# Patient Record
Sex: MALE | Race: BLACK OR AFRICAN AMERICAN | NOT HISPANIC OR LATINO
[De-identification: names, ages, dates, MRNs, and addresses within clinical notes are randomized per-mention and may not be internally consistent; named-entity substitution may affect disease eponyms.]

---

## 2017-06-06 ENCOUNTER — OTHER (OUTPATIENT)
Age: 82
End: 2017-06-06

## 2017-06-06 RX ADMIN — Medication 6: at 12:40

## 2017-06-24 ENCOUNTER — INPATIENT (INPATIENT)
Facility: HOSPITAL | Age: 82
LOS: 9 days | Discharge: HOME | DRG: 378 | End: 2017-07-04
Attending: INTERNAL MEDICINE | Admitting: INTERNAL MEDICINE
Payer: COMMERCIAL

## 2017-06-24 VITALS
SYSTOLIC BLOOD PRESSURE: 116 MMHG | DIASTOLIC BLOOD PRESSURE: 82 MMHG | RESPIRATION RATE: 16 BRPM | TEMPERATURE: 97 F | OXYGEN SATURATION: 99 % | HEART RATE: 73 BPM

## 2017-06-24 DIAGNOSIS — I35.0 NONRHEUMATIC AORTIC (VALVE) STENOSIS: ICD-10-CM

## 2017-06-24 DIAGNOSIS — K92.1 MELENA: ICD-10-CM

## 2017-06-24 LAB
ALBUMIN SERPL ELPH-MCNC: 3.5 G/DL — SIGNIFICANT CHANGE UP (ref 3.3–5)
ALP SERPL-CCNC: 58 U/L — SIGNIFICANT CHANGE UP (ref 40–120)
ALT FLD-CCNC: 17 U/L RC — SIGNIFICANT CHANGE UP (ref 10–45)
ANION GAP SERPL CALC-SCNC: 13 MMOL/L — SIGNIFICANT CHANGE UP (ref 5–17)
ANION GAP SERPL CALC-SCNC: 14 MMOL/L — SIGNIFICANT CHANGE UP (ref 5–17)
APPEARANCE UR: CLEAR — SIGNIFICANT CHANGE UP
APTT BLD: 25.3 SEC — LOW (ref 27.5–37.4)
AST SERPL-CCNC: 28 U/L — SIGNIFICANT CHANGE UP (ref 10–40)
BACTERIA # UR AUTO: NEGATIVE — SIGNIFICANT CHANGE UP
BASOPHILS # BLD AUTO: 0 K/UL — SIGNIFICANT CHANGE UP (ref 0–0.2)
BASOPHILS NFR BLD AUTO: 0.3 % — SIGNIFICANT CHANGE UP (ref 0–2)
BILIRUB SERPL-MCNC: 0.3 MG/DL — SIGNIFICANT CHANGE UP (ref 0.2–1.2)
BILIRUB UR-MCNC: NEGATIVE — SIGNIFICANT CHANGE UP
BLD GP AB SCN SERPL QL: NEGATIVE — SIGNIFICANT CHANGE UP
BUN SERPL-MCNC: 36 MG/DL — HIGH (ref 7–23)
BUN SERPL-MCNC: 42 MG/DL — HIGH (ref 7–23)
CALCIUM SERPL-MCNC: 9.5 MG/DL — SIGNIFICANT CHANGE UP (ref 8.4–10.5)
CALCIUM SERPL-MCNC: 9.7 MG/DL — SIGNIFICANT CHANGE UP (ref 8.4–10.5)
CHLORIDE SERPL-SCNC: 107 MMOL/L — SIGNIFICANT CHANGE UP (ref 96–108)
CHLORIDE SERPL-SCNC: 109 MMOL/L — HIGH (ref 96–108)
CK MB BLD-MCNC: 1.8 % — SIGNIFICANT CHANGE UP (ref 0–3.5)
CK MB CFR SERPL CALC: 2.3 NG/ML — SIGNIFICANT CHANGE UP (ref 0–6.7)
CK SERPL-CCNC: 125 U/L — SIGNIFICANT CHANGE UP (ref 30–200)
CO2 SERPL-SCNC: 18 MMOL/L — LOW (ref 22–31)
CO2 SERPL-SCNC: 18 MMOL/L — LOW (ref 22–31)
COLOR SPEC: YELLOW — SIGNIFICANT CHANGE UP
CREAT SERPL-MCNC: 1.51 MG/DL — HIGH (ref 0.5–1.3)
CREAT SERPL-MCNC: 1.79 MG/DL — HIGH (ref 0.5–1.3)
DIFF PNL FLD: NEGATIVE — SIGNIFICANT CHANGE UP
EOSINOPHIL # BLD AUTO: 0.1 K/UL — SIGNIFICANT CHANGE UP (ref 0–0.5)
EOSINOPHIL NFR BLD AUTO: 1.1 % — SIGNIFICANT CHANGE UP (ref 0–6)
EPI CELLS # UR: 1 /HPF — SIGNIFICANT CHANGE UP (ref 0–5)
GLUCOSE SERPL-MCNC: 242 MG/DL — HIGH (ref 70–99)
GLUCOSE SERPL-MCNC: 262 MG/DL — HIGH (ref 70–99)
GLUCOSE UR QL: 250 MG/DL
HCT VFR BLD CALC: 22.9 % — LOW (ref 39–50)
HCT VFR BLD CALC: 24.7 % — LOW (ref 39–50)
HCT VFR BLD CALC: 26.4 % — LOW (ref 39–50)
HGB BLD-MCNC: 7.6 G/DL — LOW (ref 13–17)
HGB BLD-MCNC: 8.3 G/DL — LOW (ref 13–17)
HGB BLD-MCNC: 8.5 G/DL — LOW (ref 13–17)
HYALINE CASTS # UR AUTO: 2 /LPF — SIGNIFICANT CHANGE UP (ref 0–7)
INR BLD: 1.25 RATIO — HIGH (ref 0.88–1.16)
KETONES UR-MCNC: NEGATIVE — SIGNIFICANT CHANGE UP
LEUKOCYTE ESTERASE UR-ACNC: NEGATIVE — SIGNIFICANT CHANGE UP
LYMPHOCYTES # BLD AUTO: 1.2 K/UL — SIGNIFICANT CHANGE UP (ref 1–3.3)
LYMPHOCYTES # BLD AUTO: 13 % — SIGNIFICANT CHANGE UP (ref 13–44)
MAGNESIUM SERPL-MCNC: 1.9 MG/DL — SIGNIFICANT CHANGE UP (ref 1.6–2.6)
MCHC RBC-ENTMCNC: 26 PG — LOW (ref 27–34)
MCHC RBC-ENTMCNC: 28 PG — SIGNIFICANT CHANGE UP (ref 27–34)
MCHC RBC-ENTMCNC: 29.6 PG — SIGNIFICANT CHANGE UP (ref 27–34)
MCHC RBC-ENTMCNC: 31.4 GM/DL — LOW (ref 32–36)
MCHC RBC-ENTMCNC: 33.2 GM/DL — SIGNIFICANT CHANGE UP (ref 32–36)
MCHC RBC-ENTMCNC: 34.3 GM/DL — SIGNIFICANT CHANGE UP (ref 32–36)
MCV RBC AUTO: 82.8 FL — SIGNIFICANT CHANGE UP (ref 80–100)
MCV RBC AUTO: 84.3 FL — SIGNIFICANT CHANGE UP (ref 80–100)
MCV RBC AUTO: 86.4 FL — SIGNIFICANT CHANGE UP (ref 80–100)
MONOCYTES # BLD AUTO: 0.7 K/UL — SIGNIFICANT CHANGE UP (ref 0–0.9)
MONOCYTES NFR BLD AUTO: 7.7 % — SIGNIFICANT CHANGE UP (ref 2–14)
NEUTROPHILS # BLD AUTO: 6.9 K/UL — SIGNIFICANT CHANGE UP (ref 1.8–7.4)
NEUTROPHILS NFR BLD AUTO: 78 % — HIGH (ref 43–77)
NITRITE UR-MCNC: NEGATIVE — SIGNIFICANT CHANGE UP
OB PNL STL: NEGATIVE — SIGNIFICANT CHANGE UP
PH UR: 5.5 — SIGNIFICANT CHANGE UP (ref 5–8)
PHOSPHATE SERPL-MCNC: 3.1 MG/DL — SIGNIFICANT CHANGE UP (ref 2.5–4.5)
PLATELET # BLD AUTO: 147 K/UL — LOW (ref 150–400)
PLATELET # BLD AUTO: 176 K/UL — SIGNIFICANT CHANGE UP (ref 150–400)
PLATELET # BLD AUTO: 194 K/UL — SIGNIFICANT CHANGE UP (ref 150–400)
POTASSIUM SERPL-MCNC: 4.8 MMOL/L — SIGNIFICANT CHANGE UP (ref 3.5–5.3)
POTASSIUM SERPL-MCNC: 5.7 MMOL/L — HIGH (ref 3.5–5.3)
POTASSIUM SERPL-SCNC: 4.8 MMOL/L — SIGNIFICANT CHANGE UP (ref 3.5–5.3)
POTASSIUM SERPL-SCNC: 5.7 MMOL/L — HIGH (ref 3.5–5.3)
PROT SERPL-MCNC: 6.9 G/DL — SIGNIFICANT CHANGE UP (ref 6–8.3)
PROT UR-MCNC: NEGATIVE MG/DL — SIGNIFICANT CHANGE UP
PROTHROM AB SERPL-ACNC: 13.7 SEC — HIGH (ref 9.8–12.7)
RBC # BLD: 2.72 M/UL — LOW (ref 4.2–5.8)
RBC # BLD: 2.86 M/UL — LOW (ref 4.2–5.8)
RBC # BLD: 3.19 M/UL — LOW (ref 4.2–5.8)
RBC # FLD: 13.9 % — SIGNIFICANT CHANGE UP (ref 10.3–14.5)
RBC # FLD: 14.3 % — SIGNIFICANT CHANGE UP (ref 10.3–14.5)
RBC # FLD: 15 % — HIGH (ref 10.3–14.5)
RBC CASTS # UR COMP ASSIST: 1 /HPF — SIGNIFICANT CHANGE UP (ref 0–4)
RH IG SCN BLD-IMP: POSITIVE — SIGNIFICANT CHANGE UP
SODIUM SERPL-SCNC: 139 MMOL/L — SIGNIFICANT CHANGE UP (ref 135–145)
SODIUM SERPL-SCNC: 140 MMOL/L — SIGNIFICANT CHANGE UP (ref 135–145)
SP GR SPEC: 1.02 — SIGNIFICANT CHANGE UP (ref 1.01–1.02)
TROPONIN T SERPL-MCNC: <0.01 NG/ML — SIGNIFICANT CHANGE UP (ref 0–0.06)
UROBILINOGEN FLD QL: NEGATIVE MG/DL — SIGNIFICANT CHANGE UP
WBC # BLD: 8.2 K/UL — SIGNIFICANT CHANGE UP (ref 3.8–10.5)
WBC # BLD: 8.48 K/UL — SIGNIFICANT CHANGE UP (ref 3.8–10.5)
WBC # BLD: 8.9 K/UL — SIGNIFICANT CHANGE UP (ref 3.8–10.5)
WBC # FLD AUTO: 8.2 K/UL — SIGNIFICANT CHANGE UP (ref 3.8–10.5)
WBC # FLD AUTO: 8.48 K/UL — SIGNIFICANT CHANGE UP (ref 3.8–10.5)
WBC # FLD AUTO: 8.9 K/UL — SIGNIFICANT CHANGE UP (ref 3.8–10.5)
WBC UR QL: 0 /HPF — SIGNIFICANT CHANGE UP (ref 0–5)

## 2017-06-24 PROCEDURE — 93010 ELECTROCARDIOGRAM REPORT: CPT

## 2017-06-24 PROCEDURE — 71010: CPT | Mod: 26

## 2017-06-24 PROCEDURE — 99285 EMERGENCY DEPT VISIT HI MDM: CPT | Mod: 25

## 2017-06-24 RX ORDER — LISINOPRIL 2.5 MG/1
10 TABLET ORAL DAILY
Qty: 0 | Refills: 0 | Status: DISCONTINUED | OUTPATIENT
Start: 2017-06-24 | End: 2017-07-04

## 2017-06-24 RX ORDER — DEXTROSE 50 % IN WATER 50 %
1 SYRINGE (ML) INTRAVENOUS ONCE
Qty: 0 | Refills: 0 | Status: DISCONTINUED | OUTPATIENT
Start: 2017-06-24 | End: 2017-07-04

## 2017-06-24 RX ORDER — INSULIN LISPRO 100/ML
VIAL (ML) SUBCUTANEOUS AT BEDTIME
Qty: 0 | Refills: 0 | Status: DISCONTINUED | OUTPATIENT
Start: 2017-06-24 | End: 2017-07-04

## 2017-06-24 RX ORDER — ATORVASTATIN CALCIUM 80 MG/1
10 TABLET, FILM COATED ORAL AT BEDTIME
Qty: 0 | Refills: 0 | Status: DISCONTINUED | OUTPATIENT
Start: 2017-06-24 | End: 2017-07-04

## 2017-06-24 RX ORDER — DEXTROSE 50 % IN WATER 50 %
12.5 SYRINGE (ML) INTRAVENOUS ONCE
Qty: 0 | Refills: 0 | Status: DISCONTINUED | OUTPATIENT
Start: 2017-06-24 | End: 2017-07-04

## 2017-06-24 RX ORDER — SODIUM CHLORIDE 9 MG/ML
1000 INJECTION, SOLUTION INTRAVENOUS
Qty: 0 | Refills: 0 | Status: DISCONTINUED | OUTPATIENT
Start: 2017-06-24 | End: 2017-07-04

## 2017-06-24 RX ORDER — TAMSULOSIN HYDROCHLORIDE 0.4 MG/1
0.4 CAPSULE ORAL AT BEDTIME
Qty: 0 | Refills: 0 | Status: DISCONTINUED | OUTPATIENT
Start: 2017-06-24 | End: 2017-07-04

## 2017-06-24 RX ORDER — DEXTROSE 50 % IN WATER 50 %
25 SYRINGE (ML) INTRAVENOUS ONCE
Qty: 0 | Refills: 0 | Status: DISCONTINUED | OUTPATIENT
Start: 2017-06-24 | End: 2017-07-04

## 2017-06-24 RX ORDER — SODIUM CHLORIDE 9 MG/ML
1000 INJECTION INTRAMUSCULAR; INTRAVENOUS; SUBCUTANEOUS
Qty: 0 | Refills: 0 | Status: DISCONTINUED | OUTPATIENT
Start: 2017-06-24 | End: 2017-07-01

## 2017-06-24 RX ORDER — INSULIN LISPRO 100/ML
VIAL (ML) SUBCUTANEOUS
Qty: 0 | Refills: 0 | Status: DISCONTINUED | OUTPATIENT
Start: 2017-06-24 | End: 2017-07-04

## 2017-06-24 RX ORDER — GLUCAGON INJECTION, SOLUTION 0.5 MG/.1ML
1 INJECTION, SOLUTION SUBCUTANEOUS ONCE
Qty: 0 | Refills: 0 | Status: DISCONTINUED | OUTPATIENT
Start: 2017-06-24 | End: 2017-07-04

## 2017-06-24 RX ORDER — AMLODIPINE BESYLATE 2.5 MG/1
10 TABLET ORAL DAILY
Qty: 0 | Refills: 0 | Status: DISCONTINUED | OUTPATIENT
Start: 2017-06-24 | End: 2017-07-04

## 2017-06-24 RX ORDER — PANTOPRAZOLE SODIUM 20 MG/1
40 TABLET, DELAYED RELEASE ORAL DAILY
Qty: 0 | Refills: 0 | Status: DISCONTINUED | OUTPATIENT
Start: 2017-06-24 | End: 2017-06-24

## 2017-06-24 RX ORDER — PANTOPRAZOLE SODIUM 20 MG/1
40 TABLET, DELAYED RELEASE ORAL
Qty: 0 | Refills: 0 | Status: DISCONTINUED | OUTPATIENT
Start: 2017-06-24 | End: 2017-07-04

## 2017-06-24 RX ADMIN — TAMSULOSIN HYDROCHLORIDE 0.4 MILLIGRAM(S): 0.4 CAPSULE ORAL at 22:43

## 2017-06-24 RX ADMIN — SODIUM CHLORIDE 40 MILLILITER(S): 9 INJECTION INTRAMUSCULAR; INTRAVENOUS; SUBCUTANEOUS at 16:25

## 2017-06-24 RX ADMIN — ATORVASTATIN CALCIUM 10 MILLIGRAM(S): 80 TABLET, FILM COATED ORAL at 22:43

## 2017-06-24 RX ADMIN — PANTOPRAZOLE SODIUM 40 MILLIGRAM(S): 20 TABLET, DELAYED RELEASE ORAL at 18:10

## 2017-06-24 NOTE — CONSULT NOTE ADULT - PROBLEM SELECTOR RECOMMENDATION 9
- H/H and cbc monitoring -- transfuse prn - to receive one unit prbcs, follow up repeat cbc after transfusion  - Check FOBT  - PPI 40 mg IV BID  - Diet: Clear liquid   - EGD planning for ? Monday - will confirm w/ attending  - To follow - H/H and cbc monitoring -- transfuse prn - to receive one unit prbcs, follow up repeat cbc after transfusion  - Check FOBT  - PPI 40 mg IV BID  - Diet: Clear liquid   - EGD planning for Monday, NPO p MN sunday  - To follow

## 2017-06-24 NOTE — H&P ADULT - HISTORY OF PRESENT ILLNESS
: 89 year old male with PMHx of aortic stenosis, DM, HTN, HLD, BPH, CAD on baby aspirin and Plavix, presenting with melena since yesterday, 7 episodes total, accompanied by weakness, lightheadedness, and mild dyspnea on exertion. He had a colonoscopy 2 years ago after having BRBPR and was found to have a polyp. Denies abdominal pain, nausea, vomiting, or fever. No other NSAID use or prior PUD. , PMD BASHIR Piper that treated LGIB 2 years ago was Emily : 89 year old male with PMHx of aortic stenosis, DM, TAVR,  HTN, HLD, BPH, CAD on baby aspirin and Plavix, presenting with melena since yesterday, 7 episodes total, accompanied by weakness, lightheadedness, and mild dyspnea on exertion. He had a colonoscopy 2 years ago after having BRBPR and was found to have a polyp. Denies abdominal pain, nausea, vomiting, or fever. No other NSAID use or prior PUD. , PMD BASHIR Piper that treated LGIB 2 years ago was Emily

## 2017-06-24 NOTE — H&P ADULT - ASSESSMENT
pt  with melena,  anemia from  acute  gi  blood loss, prbc,  gi  dr caban called, serial hb,  hold  asa/plavix  for  now

## 2017-06-24 NOTE — ED ADULT NURSE NOTE - OBJECTIVE STATEMENT
Received pt alert and orientedX4. No distress. breathing easy and non labored. Pt with intermittent forgetfulness. No active bleeding.

## 2017-06-24 NOTE — ED PROVIDER NOTE - MEDICAL DECISION MAKING DETAILS
89 year old male with melena and weakness since yesterday, on exam conjunctival pallor, will get labs incl type and screen and cardiacs, possible transfusion, GI consultation and admit to hospital for inpatient work up. 89 year old male with melena and weakness since yesterday, on exam conjunctival pallor, will get labs incl type and screen and cardiacs, possible transfusion, GI consultation and admit to hospital for inpatient work up.  Att yo male on asa/plavix presents with 4 episodes of melena since last night; no abdominal pain; no fevers; + gabriel; lightheadedness; no cp; on exam nad, lungs cta, heart rrr, nontender abdomen, + melena; Plan: labs, type, protonix, ekg, ce, admission, gi consult 89 year old male with melena and weakness since yesterday, on exam conjunctival pallor, will get labs incl type and screen and cardiacs, likely transfusion, GI consultation and admit to hospital for inpatient work up.  Att yo male on asa/plavix presents with 4 episodes of melena since last night; no abdominal pain; no fevers; + gabriel; lightheadedness; no cp; on exam nad, lungs cta, heart rrr, nontender abdomen, + melena; Plan: labs, type, protonix, ekg, ce, admission, gi consult

## 2017-06-24 NOTE — CONSULT NOTE ADULT - SUBJECTIVE AND OBJECTIVE BOX
Chief Complaint:  Patient is a 89y old  Male who presents with a chief complaint of black stools    HPI: 89 M w/ PMHX PVD/LBBB/HTN/HLD/ AS s/p AVR admitted for melena x 4 episodes within the past two days, with the last episode 3 hours ago. Patient reports no hx of melena, has been taking aspirin and plavix at home.  He denies abdominal pain/nausea/vomiting/fever/chills  Has had an egd about 5 years ago but cannot recall the results  Last colonoscopy about two years ago w/ polyps        PAST MEDICAL & SURGICAL HISTORY:  Vocal cord polyp: pending treatment diagnosed in 2014  BPH (benign prostatic hypertrophy)  PVD (peripheral vascular disease)  LBBB (left bundle branch block): incomplete  HTN (hypertension)  Hypercholesterolemia  DM2 (diabetes mellitus, type 2)  AS (aortic stenosis)  No significant past surgical history      Previous Diagnostic Testing:    EGD: About five years ago -- cannot recall results     Colonoscopy: 5-12-15    Findings:       A flat polyp was found in the ascending colon. The polyp was removed with a jumbo cold        forceps. Resection and retrieval were complete.       A sessile polyp was found in the ascending colon. The polyp was removed with a hot snare.        There was some bleeding and a resolution clip was placed. Resection and retrieval were        complete.       Multiple small and large-mouthed diverticula were found in the sigmoid colon and in the        descending colon.                                                                                                        Impression:          - One polyp in the ascending colon. Resected and retrieved.                       - One polyp in the ascending colon. Resected and retrieved.  Recommendation:      - Await pathology results.                                                                                                            FAMILY HISTORY:  No pertinent family history in first degree relatives      Social History: Marital Status:  Lives With: Spouse Substance Abuse: None Smoker: Unknown    Allergies    Cipro (Pruritus; Rash)    Intolerances        Review of Systems:    General:  No wt loss, fevers, chills, night sweats  Eyes:  Good vision, no reported pain  ENT:  No sore throat, pain, runny nose, dysphagia  CV:  No pain, palpitations hypo/hypertension  Resp:  No dyspnea, cough, tachypnea, wheezing  :  No pain, bleeding, incontinence, nocturia  Muscle:  No pain, + weakness  Neuro:  No weakness, tingling, memory problems  Psych:  No fatigue, insomnia, mood problems, depression  Endocrine:  No polyuria, polydypsia, cold/heat intolerance  Heme:  No petechiae, ecchymosis, easy bruisability  Skin:  No rash, tattoos, scars, edema    Physical Exam:    Vital Signs:  Vital Signs Last 24 Hrs  T(C): 36.3, Max: 36.3 (06-24 @ 10:04)  T(F): 97.4, Max: 97.4 (06-24 @ 10:04)  HR: 73 (73 - 73)  BP: 116/82 (116/82 - 116/82)  BP(mean): --  RR: 16 (16 - 16)  SpO2: 99% (99% - 99%)  Daily     Daily     General:  Appears stated age, well-groomed, well-nourished, no distress  HEENT:  NC/AT,  conjunctivae clear and pink, no thyromegaly, nodules, adenopathy, no JVD  Chest:  Full & symmetric excursion, no increased effort, breath sounds clear  Cardiovascular:  Regular rhythm, S1, S2, no murmur/rub/S3/S4, no abdominal bruit, no edema  Abdomen:  Soft, non-tender, non-distended, normoactive bowel sounds,  no masses ,no hepatosplenomegaly, no signs of chronic liver disease  Extremities:  no cyanosis, clubbing or edema  Skin:  No rash/erythema/ecchymoses/petechiae/wounds/abscess/warm/dry  Neuro/Psych:  Alert, oriented, no asterixis, no tremor, no encephalopathy      Imaging:      Laboratory:  06-24    140  |  109<H>  |  42<H>  ----------------------------<  262<H>  5.7<H>   |  18<L>  |  1.79<H>    Ca    9.7      24 Jun 2017 10:58  Phos  3.1     06-24  Mg     1.9     06-24    TPro  6.9  /  Alb  3.5  /  TBili  0.3  /  DBili  x   /  AST  28  /  ALT  17  /  AlkPhos  58  06-24                          7.6    8.9   )-----------( 176      ( 24 Jun 2017 10:58 )             22.9         LIVER FUNCTIONS - ( 24 Jun 2017 10:58 )  Alb: 3.5 g/dL / Pro: 6.9 g/dL / ALK PHOS: 58 U/L / ALT: 17 U/L RC / AST: 28 U/L / GGT: x           PT/INR - ( 24 Jun 2017 10:58 )   PT: 13.7 sec;   INR: 1.25 ratio         PTT - ( 24 Jun 2017 10:58 )  PTT:25.3 sec
CHIEF COMPLAINT:Patient is a 89y old  Male who presents with a chief complaint of melena (24 Jun 2017 12:04)      HPI:  : 89 year old male with PMHx of aortic stenosis, DM, TAVR,  HTN, HLD, BPH, CAD on baby aspirin and Plavix, presenting with melena since yesterday, 7 episodes total, accompanied by weakness, lightheadedness, and mild dyspnea on exertion. He had a colonoscopy 2 years ago after having BRBPR and was found to have a polyp. Denies abdominal pain, nausea, vomiting, or fever. No other NSAID use or prior PUD. , PMD BASHIR Piper that treated LGIB 2 years ago was Emily (24 Jun 2017 12:04)  no chest pain,no sob.pt was found to be anemic .    PAST MEDICAL & SURGICAL HISTORY:  Vocal cord polyp: pending treatment diagnosed in 2014  BPH (benign prostatic hypertrophy)  PVD (peripheral vascular disease)  LBBB (left bundle branch block): incomplete  HTN (hypertension)  Hypercholesterolemia  DM2 (diabetes mellitus, type 2)  AS (aortic stenosis)  No significant past surgical history      MEDICATIONS  (STANDING):  pantoprazole  Injectable 40milliGRAM(s) IV Push two times a day  lisinopril 10milliGRAM(s) Oral daily  tamsulosin 0.4milliGRAM(s) Oral at bedtime  atorvastatin 10milliGRAM(s) Oral at bedtime  amLODIPine   Tablet 10milliGRAM(s) Oral daily    MEDICATIONS  (PRN):      FAMILY HISTORY:  No pertinent family history in first degree relatives      SOCIAL HISTORY:    [ ] Non-smoker  [ ] Smoker  [ ] Alcohol    Allergies    Cipro (Pruritus; Rash)    Intolerances    	    REVIEW OF SYSTEMS:  CONSTITUTIONAL:no, visual disturbances, or discharge,+fatigue  ENT:  No difficulty hearing, tinnitus, vertigo; No sinus or throat pain  NECK: No pain or stiffness  RESPIRATORY: No cough, wheezing, chills or hemoptysis; No Shortness of Breath  CARDIOVASCULAR: No chest pain, palpitations, passing out, dizziness, or leg swelling  GASTROINTESTINAL: No abdominal or epigastric pain. No nausea, vomiting, or hematemesis; No diarrhea or constipation. No melena or hematochezia.  GENITOURINARY: No dysuria, frequency, hematuria, or incontinence  NEUROLOGICAL: No headaches, memory loss, loss of strength, numbness, or tremors  SKIN: No itching, burning, rashes, or lesions   LYMPH Nodes: No enlarged glands  ENDOCRINE: No heat or cold intolerance; No hair loss  MUSCULOSKELETAL: No joint pain or swelling; No muscle, back, or extremity pain  PSYCHIATRIC: No depression, anxiety, mood swings, or difficulty sleeping  HEME/LYMPH: No easy bruising, or bleeding gums  ALLERGY AND IMMUNOLOGIC: No hives or eczema	    [ ] All others negative	  [ ] Unable to obtain    PHYSICAL EXAM:  T(C): 36.3, Max: 36.3 (06-24 @ 10:04)  HR: 73 (73 - 73)  BP: 116/82 (116/82 - 116/82)  RR: 16 (16 - 16)  SpO2: 99% (99% - 99%)  Wt(kg): --  I&O's Summary      Appearance: Normal	  HEENT:   Normal oral mucosa, PERRL, EOMI	  Lymphatic: No lymphadenopathy  Cardiovascular: Normal S1 S2, No JVD, + SADIA murmurs, No edema  Respiratory: Lungs clear to auscultation	  Psychiatry: A & O x 3, Mood & affect appropriate  Gastrointestinal:  Soft, Non-tender, + BS	  Skin: No rashes, No ecchymoses, No cyanosis	  Neurologic: Non-focal  Extremities: Normal range of motion, No clubbing, cyanosis or edema  Vascular: Peripheral pulses palpable 2+ bilaterally    TELEMETRY: 	    ECG:NORMAL SINUS RHYTHM  LEFT VENTRICULAR HYPERTROPHY WITH QRS WIDENING AND REPOLARIZATION ABNORMALITY  CANNOT RULE OUT SEPTAL INFARCT , AGE UNDETERMINED  RADIOLOGY:  OTHER: 	  	  LABS:	 	    CARDIAC MARKERS:  CARDIAC MARKERS ( 24 Jun 2017 10:58 )  x     / <0.01 ng/mL / 125 U/L / x     / 2.3 ng/mL    ECHO  . Transcatheter aortic valve replacement, well seated.  Peak transaortic valve gradient equals 46 mm Hg, mean  transaortic valve gradient equals 25 mm Hg, which is  elevated. Elevated gradients are likely due to hyperdynamic  LV function.  No paravalvular or valvular aortic valve  regurgitation seen.  2. Normal left ventricular internal dimensions and wall  thicknesses.  3. Hyperdynamic left ventricle. Peak left ventricular  outflow tract gradient equals 49 mm Hg (87 with valsalva),  without evidence of obstruction due to hyperdynamic LV  function.  4. Mild diastolic dysfunction (Stage I).  5. Normal right ventricular size and function.  6. Estimated pulmonary artery systolic pressure equals 42  mm Hg, assuming right atrial pressure equals 8 mm Hg,  consistent with mild pulmonary pressures.                        7.6    8.9   )-----------( 176      ( 24 Jun 2017 10:58 )             22.9     06-24    140  |  109<H>  |  42<H>  ----------------------------<  262<H>  5.7<H>   |  18<L>  |  1.79<H>    Ca    9.7      24 Jun 2017 10:58  Phos  3.1     06-24  Mg     1.9     06-24    TPro  6.9  /  Alb  3.5  /  TBili  0.3  /  DBili  x   /  AST  28  /  ALT  17  /  AlkPhos  58  06-24    proBNP:   Lipid Profile:   HgA1c:   TSH:     PREVIOUS DIAGNOSTIC TESTING:    [ ] Echocardiogram:  [ ]  Catheterization:  [ ] Stress Test:        IMPRESSION: Removal of right jugular line since 4:27 AM on 7/03/15.

## 2017-06-24 NOTE — CONSULT NOTE ADULT - ASSESSMENT
pt with hx of cad,s/p tavr in 2015 now with melena.  fu cbc  kepp hgb>10  may hold asa and plavix  pt is clear for egd.  continue all cardiac meds.

## 2017-06-24 NOTE — ED ADULT NURSE NOTE - PMH
AS (aortic stenosis)    BPH (benign prostatic hypertrophy)    DM2 (diabetes mellitus, type 2)    HTN (hypertension)    Hypercholesterolemia    LBBB (left bundle branch block)  incomplete  PVD (peripheral vascular disease)    Vocal cord polyp  pending treatment diagnosed in 2014

## 2017-06-24 NOTE — ED PROVIDER NOTE - OBJECTIVE STATEMENT
89 year old male with PMHx of aortic stenosis, DM, HTN, HLD, BPH, CAD on baby aspirin and Plavix, presenting with melena since yesterday, 7 episodes total, accompanied by weakness, lightheadedness, and mild dyspnea on exertion. He had a colonoscopy 2 years ago after having BRBPR and was found to have a polyp. Denies abdominal pain, nausea, vomiting, or fever. No other NSAID use or prior PUD.   PMD BASHIR Piper that treated LGIB 2 years ago was Emily

## 2017-06-24 NOTE — ED PROVIDER NOTE - ATTENDING CONTRIBUTION TO CARE
Att yo male on asa/plavix presents with 4 episodes of melena since last night; no abdominal pain; no fevers; + gabriel; lightheadedness; no cp; on exam nad, lungs cta, heart rrr, nontender abdomen, + melena; Plan: labs, type, protonix, ekg, ce, admission, gi consult

## 2017-06-24 NOTE — H&P ADULT - NSHPREVIEWOFSYSTEMS_GEN_ALL_CORE
REVIEW OF SYSTEMS:    CONSTITUTIONAL: No weakness, fevers or chills  EYES/ENT: No visual changes;  No vertigo or throat pain   NECK: No pain or stiffness  RESPIRATORY: No cough, wheezing, hemoptysis; No shortness of breath  CARDIOVASCULAR: No chest pain or palpitations  GASTROINTESTINAL: No abdominal or epigastric pain. No nausea, vomiting, or hematemesis; No diarrhea or constipation. melena  GENITOURINARY: No dysuria, frequency or hematuria  NEUROLOGICAL: No numbness or weakness  SKIN: No itching, rashes

## 2017-06-24 NOTE — H&P ADULT - NSHPPHYSICALEXAM_GEN_ALL_CORE
PHYSICAL EXAMINATION:  Vital Signs Last 24 Hrs  T(C): 36.3, Max: 36.3 (06-24 @ 10:04)  T(F): 97.4, Max: 97.4 (06-24 @ 10:04)  HR: 73 (73 - 73)  BP: 116/82 (116/82 - 116/82)  BP(mean): --  RR: 16 (16 - 16)  SpO2: 99% (99% - 99%)  CAPILLARY BLOOD GLUCOSE        GENERAL: NAD, well-groomed, well-developed  HEAD:  atraumatic, normocephalic  EYES: sclera anicteric  ENMT: mucous membranes moist  NECK: supple, No JVD  CHEST/LUNG: clear to auscultation bilaterally; no rales, rhonchi, or wheezing b/l  HEART: normal S1, S2  ABDOMEN: BS+, soft, ND, NT   EXTREMITIES:  pulses palpable; no clubbing, cyanosis, or edema b/l LEs  NEURO: awake, alert, interactive; moves all extremities  SKIN: no rashes or lesions

## 2017-06-24 NOTE — H&P ADULT - NSHPLABSRESULTS_GEN_ALL_CORE
LABS:                        7.6    8.9   )-----------( 176      ( 24 Jun 2017 10:58 )             22.9     06-24    140  |  109<H>  |  42<H>  ----------------------------<  262<H>  5.7<H>   |  18<L>  |  1.79<H>    Ca    9.7      24 Jun 2017 10:58  Phos  3.1     06-24  Mg     1.9     06-24    TPro  6.9  /  Alb  3.5  /  TBili  0.3  /  DBili  x   /  AST  28  /  ALT  17  /  AlkPhos  58  06-24    PT/INR - ( 24 Jun 2017 10:58 )   PT: 13.7 sec;   INR: 1.25 ratio         PTT - ( 24 Jun 2017 10:58 )  PTT:25.3 sec        RADIOLOGY & ADDITIONAL TESTS:

## 2017-06-25 LAB
ANION GAP SERPL CALC-SCNC: 11 MMOL/L — SIGNIFICANT CHANGE UP (ref 5–17)
APTT BLD: 28.2 SEC — SIGNIFICANT CHANGE UP (ref 27.5–37.4)
BASOPHILS # BLD AUTO: 0.02 K/UL — SIGNIFICANT CHANGE UP (ref 0–0.2)
BASOPHILS NFR BLD AUTO: 0.3 % — SIGNIFICANT CHANGE UP (ref 0–2)
BUN SERPL-MCNC: 28 MG/DL — HIGH (ref 7–23)
CALCIUM SERPL-MCNC: 9.1 MG/DL — SIGNIFICANT CHANGE UP (ref 8.4–10.5)
CHLORIDE SERPL-SCNC: 110 MMOL/L — HIGH (ref 96–108)
CO2 SERPL-SCNC: 19 MMOL/L — LOW (ref 22–31)
CREAT SERPL-MCNC: 1.46 MG/DL — HIGH (ref 0.5–1.3)
EOSINOPHIL # BLD AUTO: 0.2 K/UL — SIGNIFICANT CHANGE UP (ref 0–0.5)
EOSINOPHIL NFR BLD AUTO: 2.6 % — SIGNIFICANT CHANGE UP (ref 0–6)
GLUCOSE SERPL-MCNC: 104 MG/DL — HIGH (ref 70–99)
HBA1C BLD-MCNC: 7.8 % — HIGH (ref 4–5.6)
HCT VFR BLD CALC: 24.2 % — LOW (ref 39–50)
HCT VFR BLD CALC: 25.5 % — LOW (ref 39–50)
HCT VFR BLD CALC: 28.4 % — LOW (ref 39–50)
HGB BLD-MCNC: 7.6 G/DL — LOW (ref 13–17)
HGB BLD-MCNC: 9 G/DL — LOW (ref 13–17)
HGB BLD-MCNC: 9.1 G/DL — LOW (ref 13–17)
IMM GRANULOCYTES NFR BLD AUTO: 0.4 % — SIGNIFICANT CHANGE UP (ref 0–1.5)
INR BLD: 1.21 RATIO — HIGH (ref 0.88–1.16)
LYMPHOCYTES # BLD AUTO: 1.59 K/UL — SIGNIFICANT CHANGE UP (ref 1–3.3)
LYMPHOCYTES # BLD AUTO: 20.7 % — SIGNIFICANT CHANGE UP (ref 13–44)
MCHC RBC-ENTMCNC: 25.2 PG — LOW (ref 27–34)
MCHC RBC-ENTMCNC: 26.8 PG — LOW (ref 27–34)
MCHC RBC-ENTMCNC: 30.3 PG — SIGNIFICANT CHANGE UP (ref 27–34)
MCHC RBC-ENTMCNC: 31.4 GM/DL — LOW (ref 32–36)
MCHC RBC-ENTMCNC: 32 GM/DL — SIGNIFICANT CHANGE UP (ref 32–36)
MCHC RBC-ENTMCNC: 35.2 GM/DL — SIGNIFICANT CHANGE UP (ref 32–36)
MCV RBC AUTO: 80.1 FL — SIGNIFICANT CHANGE UP (ref 80–100)
MCV RBC AUTO: 83.5 FL — SIGNIFICANT CHANGE UP (ref 80–100)
MCV RBC AUTO: 86 FL — SIGNIFICANT CHANGE UP (ref 80–100)
MONOCYTES # BLD AUTO: 0.87 K/UL — SIGNIFICANT CHANGE UP (ref 0–0.9)
MONOCYTES NFR BLD AUTO: 11.3 % — SIGNIFICANT CHANGE UP (ref 2–14)
NEUTROPHILS # BLD AUTO: 4.98 K/UL — SIGNIFICANT CHANGE UP (ref 1.8–7.4)
NEUTROPHILS NFR BLD AUTO: 64.7 % — SIGNIFICANT CHANGE UP (ref 43–77)
PLATELET # BLD AUTO: 157 K/UL — SIGNIFICANT CHANGE UP (ref 150–400)
PLATELET # BLD AUTO: 186 K/UL — SIGNIFICANT CHANGE UP (ref 150–400)
PLATELET # BLD AUTO: 201 K/UL — SIGNIFICANT CHANGE UP (ref 150–400)
POTASSIUM SERPL-MCNC: 4.8 MMOL/L — SIGNIFICANT CHANGE UP (ref 3.5–5.3)
POTASSIUM SERPL-SCNC: 4.8 MMOL/L — SIGNIFICANT CHANGE UP (ref 3.5–5.3)
PROTHROM AB SERPL-ACNC: 13.7 SEC — HIGH (ref 10–13.1)
RBC # BLD: 2.96 M/UL — LOW (ref 4.2–5.8)
RBC # BLD: 3.02 M/UL — LOW (ref 4.2–5.8)
RBC # BLD: 3.4 M/UL — LOW (ref 4.2–5.8)
RBC # FLD: 13.6 % — SIGNIFICANT CHANGE UP (ref 10.3–14.5)
RBC # FLD: 14.4 % — SIGNIFICANT CHANGE UP (ref 10.3–14.5)
RBC # FLD: 14.7 % — HIGH (ref 10.3–14.5)
SODIUM SERPL-SCNC: 140 MMOL/L — SIGNIFICANT CHANGE UP (ref 135–145)
WBC # BLD: 7.69 K/UL — SIGNIFICANT CHANGE UP (ref 3.8–10.5)
WBC # BLD: 9.1 K/UL — SIGNIFICANT CHANGE UP (ref 3.8–10.5)
WBC # BLD: 9.6 K/UL — SIGNIFICANT CHANGE UP (ref 3.8–10.5)
WBC # FLD AUTO: 7.69 K/UL — SIGNIFICANT CHANGE UP (ref 3.8–10.5)
WBC # FLD AUTO: 9.1 K/UL — SIGNIFICANT CHANGE UP (ref 3.8–10.5)
WBC # FLD AUTO: 9.6 K/UL — SIGNIFICANT CHANGE UP (ref 3.8–10.5)

## 2017-06-25 RX ORDER — ACETAMINOPHEN 500 MG
650 TABLET ORAL EVERY 6 HOURS
Qty: 0 | Refills: 0 | Status: DISCONTINUED | OUTPATIENT
Start: 2017-06-25 | End: 2017-07-04

## 2017-06-25 RX ADMIN — AMLODIPINE BESYLATE 10 MILLIGRAM(S): 2.5 TABLET ORAL at 05:43

## 2017-06-25 RX ADMIN — Medication 650 MILLIGRAM(S): at 16:14

## 2017-06-25 RX ADMIN — ATORVASTATIN CALCIUM 10 MILLIGRAM(S): 80 TABLET, FILM COATED ORAL at 21:36

## 2017-06-25 RX ADMIN — Medication: at 12:52

## 2017-06-25 RX ADMIN — TAMSULOSIN HYDROCHLORIDE 0.4 MILLIGRAM(S): 0.4 CAPSULE ORAL at 21:36

## 2017-06-25 RX ADMIN — PANTOPRAZOLE SODIUM 40 MILLIGRAM(S): 20 TABLET, DELAYED RELEASE ORAL at 21:36

## 2017-06-25 RX ADMIN — PANTOPRAZOLE SODIUM 40 MILLIGRAM(S): 20 TABLET, DELAYED RELEASE ORAL at 05:43

## 2017-06-25 RX ADMIN — LISINOPRIL 10 MILLIGRAM(S): 2.5 TABLET ORAL at 05:43

## 2017-06-25 RX ADMIN — Medication 650 MILLIGRAM(S): at 16:59

## 2017-06-25 NOTE — PROGRESS NOTE ADULT - PROBLEM SELECTOR PLAN 2
- H/H and cbc monitoring -- to receive 1 unit prbcs today as well - f/u repeat cbc  - FOBT negative  - Continue ppi 40 mg iv BID  - Diet: clear liquid, npo P MN  - Plan for EGD in AM - H/H and cbc monitoring -- to receive 1 unit prbcs today as well - f/u repeat cbc  - FOBT negative  - Continue ppi 40 mg iv BID  - Diet: full liquid, npo P MN  - Plan for EGD in AM

## 2017-06-25 NOTE — PROGRESS NOTE ADULT - ASSESSMENT
melena,  acute gi blood  loss anemia, prbc  today, gi  dr caban  called  by er, to  see pt, follow  post  transfusion hb, , awaiting egd, on clear  liquid

## 2017-06-26 ENCOUNTER — RESULT REVIEW (OUTPATIENT)
Age: 82
End: 2017-06-26

## 2017-06-26 LAB
ANION GAP SERPL CALC-SCNC: 12 MMOL/L — SIGNIFICANT CHANGE UP (ref 5–17)
APTT BLD: 26.2 SEC — LOW (ref 27.5–37.4)
BASOPHILS # BLD AUTO: 0.01 K/UL — SIGNIFICANT CHANGE UP (ref 0–0.2)
BASOPHILS NFR BLD AUTO: 0.1 % — SIGNIFICANT CHANGE UP (ref 0–2)
BUN SERPL-MCNC: 22 MG/DL — SIGNIFICANT CHANGE UP (ref 7–23)
CALCIUM SERPL-MCNC: 8.8 MG/DL — SIGNIFICANT CHANGE UP (ref 8.4–10.5)
CHLORIDE SERPL-SCNC: 107 MMOL/L — SIGNIFICANT CHANGE UP (ref 96–108)
CO2 SERPL-SCNC: 18 MMOL/L — LOW (ref 22–31)
CREAT SERPL-MCNC: 1.33 MG/DL — HIGH (ref 0.5–1.3)
EOSINOPHIL # BLD AUTO: 0.22 K/UL — SIGNIFICANT CHANGE UP (ref 0–0.5)
EOSINOPHIL NFR BLD AUTO: 2.7 % — SIGNIFICANT CHANGE UP (ref 0–6)
GLUCOSE SERPL-MCNC: 145 MG/DL — HIGH (ref 70–99)
HCT VFR BLD CALC: 24.6 % — LOW (ref 39–50)
HGB BLD-MCNC: 8 G/DL — LOW (ref 13–17)
IMM GRANULOCYTES NFR BLD AUTO: 0.4 % — SIGNIFICANT CHANGE UP (ref 0–1.5)
INR BLD: 1.12 RATIO — SIGNIFICANT CHANGE UP (ref 0.88–1.16)
LYMPHOCYTES # BLD AUTO: 1.28 K/UL — SIGNIFICANT CHANGE UP (ref 1–3.3)
LYMPHOCYTES # BLD AUTO: 15.8 % — SIGNIFICANT CHANGE UP (ref 13–44)
MCHC RBC-ENTMCNC: 26.8 PG — LOW (ref 27–34)
MCHC RBC-ENTMCNC: 32.5 GM/DL — SIGNIFICANT CHANGE UP (ref 32–36)
MCV RBC AUTO: 82.3 FL — SIGNIFICANT CHANGE UP (ref 80–100)
MONOCYTES # BLD AUTO: 0.82 K/UL — SIGNIFICANT CHANGE UP (ref 0–0.9)
MONOCYTES NFR BLD AUTO: 10.1 % — SIGNIFICANT CHANGE UP (ref 2–14)
NEUTROPHILS # BLD AUTO: 5.75 K/UL — SIGNIFICANT CHANGE UP (ref 1.8–7.4)
NEUTROPHILS NFR BLD AUTO: 70.9 % — SIGNIFICANT CHANGE UP (ref 43–77)
PLATELET # BLD AUTO: 170 K/UL — SIGNIFICANT CHANGE UP (ref 150–400)
POTASSIUM SERPL-MCNC: 4.3 MMOL/L — SIGNIFICANT CHANGE UP (ref 3.5–5.3)
POTASSIUM SERPL-SCNC: 4.3 MMOL/L — SIGNIFICANT CHANGE UP (ref 3.5–5.3)
PROTHROM AB SERPL-ACNC: 12.7 SEC — SIGNIFICANT CHANGE UP (ref 10–13.1)
RBC # BLD: 2.99 M/UL — LOW (ref 4.2–5.8)
RBC # FLD: 14.6 % — HIGH (ref 10.3–14.5)
SODIUM SERPL-SCNC: 137 MMOL/L — SIGNIFICANT CHANGE UP (ref 135–145)
WBC # BLD: 8.11 K/UL — SIGNIFICANT CHANGE UP (ref 3.8–10.5)
WBC # FLD AUTO: 8.11 K/UL — SIGNIFICANT CHANGE UP (ref 3.8–10.5)

## 2017-06-26 RX ADMIN — TAMSULOSIN HYDROCHLORIDE 0.4 MILLIGRAM(S): 0.4 CAPSULE ORAL at 21:00

## 2017-06-26 RX ADMIN — ATORVASTATIN CALCIUM 10 MILLIGRAM(S): 80 TABLET, FILM COATED ORAL at 21:01

## 2017-06-26 RX ADMIN — Medication 2: at 12:20

## 2017-06-26 RX ADMIN — PANTOPRAZOLE SODIUM 40 MILLIGRAM(S): 20 TABLET, DELAYED RELEASE ORAL at 17:48

## 2017-06-26 RX ADMIN — AMLODIPINE BESYLATE 10 MILLIGRAM(S): 2.5 TABLET ORAL at 05:22

## 2017-06-26 RX ADMIN — SODIUM CHLORIDE 40 MILLILITER(S): 9 INJECTION INTRAMUSCULAR; INTRAVENOUS; SUBCUTANEOUS at 21:18

## 2017-06-26 RX ADMIN — PANTOPRAZOLE SODIUM 40 MILLIGRAM(S): 20 TABLET, DELAYED RELEASE ORAL at 05:22

## 2017-06-26 RX ADMIN — LISINOPRIL 10 MILLIGRAM(S): 2.5 TABLET ORAL at 05:22

## 2017-06-26 RX ADMIN — Medication 2: at 08:04

## 2017-06-26 NOTE — DISCHARGE NOTE ADULT - INSTRUCTIONS
Call and make follow up appointment with Primary Care Provider and endocrinologist after discharge. Return to the nearest emergency room or call [phone] for dizziness, palpitations, chest pain or bloodib in the stool.

## 2017-06-26 NOTE — DISCHARGE NOTE ADULT - MEDICATION SUMMARY - MEDICATIONS TO STOP TAKING
I will STOP taking the medications listed below when I get home from the hospital:    acetaminophen-oxyCODONE 325 mg-5 mg oral tablet  -- 1 tab(s) by mouth every 4 hours, As needed, Mild Pain    acetaminophen-oxyCODONE 325 mg-5 mg oral tablet  -- 2 tab(s) by mouth every 6 hours, As needed, Moderate Pain    clopidogrel 75 mg oral tablet  -- 1 tab(s) by mouth once a day    Aspirin Enteric Coated 81 mg oral delayed release tablet  -- 1 tab(s) by mouth once a day    Coreg 6.25 mg oral tablet  -- 1 tab(s) by mouth 2 times a day with meals    Omega-3 oral capsule  -- 1 cap(s) by mouth once a day    Centrum Men's  -- 1 tab(s) by mouth once a day

## 2017-06-26 NOTE — DISCHARGE NOTE ADULT - CARE PLAN
Principal Discharge DX:	Melena  Goal:	Resolved  Instructions for follow-up, activity and diet:	Please follow up with your primary medical doctor.  Secondary Diagnosis:	DM2 (diabetes mellitus, type 2)  Instructions for follow-up, activity and diet:	Your next appointment with endocrinologist ([facility] endocrine ph: [phone])/PMD is -   HgA1C this admission -  Make sure you get your HgA1c checked every three months.  If you take oral diabetes medications, check your blood glucose two times a day.  If you take insulin, check your blood glucose before meals and at bedtime.  It's important not to skip any meals.  Keep a log of your blood glucose results and always take it with you to your doctor appointments.  Keep a list of your current medications including injectables and over the counter medications and bring this medication list with you to all your doctor appointments.  If you have not seen your ophthalmologist this year call for appointment.  Check your feet daily for redness, sores, or openings. Do not self treat. If no improvement in two days call your primary care physician for an appointment.  Low blood sugar (hypoglycemia) is a blood sugar below 70mg/dl. Check your blood sugar if you feel signs/symptoms of hypoglycemia. If your blood sugar is below 70 take 15 grams of carbohydrates (ex 4 oz of apple juice, 3-4 glucose tablets, or 4-6 oz of regular soda) wait 15 minutes and repeat blood sugar to make sure it comes up above 70.  If your blood sugar is above 70 and you are due for a meal, have a meal.  If you are not due for a meal have a snack.  This snack helps keeps your blood sugar at a safe range.  Secondary Diagnosis:	HTN (hypertension)  Instructions for follow-up, activity and diet:	Take medications for your blood pressure as recommended.  Eat a heart healthy diet that is low in saturated fats and salt, and includes whole grains, fruits, vegetables and lean protein   Exercise regularly (consult with your physician or cardiologist first); maintain a heart healthy weight.   If you smoke - quit (A resource to help you stop smoking is the [facility] – phone number [phone].). Continue to follow with your primary physician or cardiologist.   Seek medical help for dizziness, Lightheadedness, Blurry vision, Headache, Chest pain, Shortness of breath  Follow up with your medical doctor to establish long term blood pressure treatment goals. Principal Discharge DX:	Melena  Goal:	Resolved  Instructions for follow-up, activity and diet:	Please follow up with your primary medical doctor.  Secondary Diagnosis:	DM2 (diabetes mellitus, type 2)  Instructions for follow-up, activity and diet:	Your next appointment with endocrinologist ([facility] endocrine ph: [phone])/PMD is -   HgA1C this admission -  Make sure you get your HgA1c checked every three months.  If you take oral diabetes medications, check your blood glucose two times a day.  If you take insulin, check your blood glucose before meals and at bedtime.  It's important not to skip any meals.  Keep a log of your blood glucose results and always take it with you to your doctor appointments.  Keep a list of your current medications including injectables and over the counter medications and bring this medication list with you to all your doctor appointments.  If you have not seen your ophthalmologist this year call for appointment.  Check your feet daily for redness, sores, or openings. Do not self treat. If no improvement in two days call your primary care physician for an appointment.  Low blood sugar (hypoglycemia) is a blood sugar below 70mg/dl. Check your blood sugar if you feel signs/symptoms of hypoglycemia. If your blood sugar is below 70 take 15 grams of carbohydrates (ex 4 oz of apple juice, 3-4 glucose tablets, or 4-6 oz of regular soda) wait 15 minutes and repeat blood sugar to make sure it comes up above 70.  If your blood sugar is above 70 and you are due for a meal, have a meal.  If you are not due for a meal have a snack.  This snack helps keeps your blood sugar at a safe range.  Secondary Diagnosis:	HTN (hypertension)  Instructions for follow-up, activity and diet:	Take medications for your blood pressure as recommended.  Eat a heart healthy diet that is low in saturated fats and salt, and includes whole grains, fruits, vegetables and lean protein   Exercise regularly (consult with your physician or cardiologist first); maintain a heart healthy weight.   If you smoke - quit (A resource to help you stop smoking is the [facility] – phone number [phone].). Continue to follow with your primary physician or cardiologist.   Seek medical help for dizziness, Lightheadedness, Blurry vision, Headache, Chest pain, Shortness of breath  Follow up with your medical doctor to establish long term blood pressure treatment goals. Principal Discharge DX:	Melena  Goal:	Resolved  Instructions for follow-up, activity and diet:	Please follow up with your primary medical doctor.  Secondary Diagnosis:	DM2 (diabetes mellitus, type 2)  Instructions for follow-up, activity and diet:	Your next appointment with endocrinologist ([facility] endocrine ph: [phone])/PMD is -   HgA1C this admission -  Make sure you get your HgA1c checked every three months.  If you take oral diabetes medications, check your blood glucose two times a day.  If you take insulin, check your blood glucose before meals and at bedtime.  It's important not to skip any meals.  Keep a log of your blood glucose results and always take it with you to your doctor appointments.  Keep a list of your current medications including injectables and over the counter medications and bring this medication list with you to all your doctor appointments.  If you have not seen your ophthalmologist this year call for appointment.  Check your feet daily for redness, sores, or openings. Do not self treat. If no improvement in two days call your primary care physician for an appointment.  Low blood sugar (hypoglycemia) is a blood sugar below 70mg/dl. Check your blood sugar if you feel signs/symptoms of hypoglycemia. If your blood sugar is below 70 take 15 grams of carbohydrates (ex 4 oz of apple juice, 3-4 glucose tablets, or 4-6 oz of regular soda) wait 15 minutes and repeat blood sugar to make sure it comes up above 70.  If your blood sugar is above 70 and you are due for a meal, have a meal.  If you are not due for a meal have a snack.  This snack helps keeps your blood sugar at a safe range.  Secondary Diagnosis:	HTN (hypertension)  Instructions for follow-up, activity and diet:	Take medications for your blood pressure as recommended.  Eat a heart healthy diet that is low in saturated fats and salt, and includes whole grains, fruits, vegetables and lean protein   Exercise regularly (consult with your physician or cardiologist first); maintain a heart healthy weight.   If you smoke - quit (A resource to help you stop smoking is the [facility] – phone number [phone].). Continue to follow with your primary physician or cardiologist.   Seek medical help for dizziness, Lightheadedness, Blurry vision, Headache, Chest pain, Shortness of breath  Follow up with your medical doctor to establish long term blood pressure treatment goals. Principal Discharge DX:	Melena  Goal:	Resolved  Instructions for follow-up, activity and diet:	Please follow up with your primary medical doctor.  Secondary Diagnosis:	DM2 (diabetes mellitus, type 2)  Instructions for follow-up, activity and diet:	Your next appointment with endocrinologist ([facility] endocrine ph: [phone])/PMD is -   HgA1C this admission -  Make sure you get your HgA1c checked every three months.  If you take oral diabetes medications, check your blood glucose two times a day.  If you take insulin, check your blood glucose before meals and at bedtime.  It's important not to skip any meals.  Keep a log of your blood glucose results and always take it with you to your doctor appointments.  Keep a list of your current medications including injectables and over the counter medications and bring this medication list with you to all your doctor appointments.  If you have not seen your ophthalmologist this year call for appointment.  Check your feet daily for redness, sores, or openings. Do not self treat. If no improvement in two days call your primary care physician for an appointment.  Low blood sugar (hypoglycemia) is a blood sugar below 70mg/dl. Check your blood sugar if you feel signs/symptoms of hypoglycemia. If your blood sugar is below 70 take 15 grams of carbohydrates (ex 4 oz of apple juice, 3-4 glucose tablets, or 4-6 oz of regular soda) wait 15 minutes and repeat blood sugar to make sure it comes up above 70.  If your blood sugar is above 70 and you are due for a meal, have a meal.  If you are not due for a meal have a snack.  This snack helps keeps your blood sugar at a safe range.  Secondary Diagnosis:	HTN (hypertension)  Instructions for follow-up, activity and diet:	Take medications for your blood pressure as recommended.  Eat a heart healthy diet that is low in saturated fats and salt, and includes whole grains, fruits, vegetables and lean protein   Exercise regularly (consult with your physician or cardiologist first); maintain a heart healthy weight.   If you smoke - quit (A resource to help you stop smoking is the [facility] – phone number [phone].). Continue to follow with your primary physician or cardiologist.   Seek medical help for dizziness, Lightheadedness, Blurry vision, Headache, Chest pain, Shortness of breath  Follow up with your medical doctor to establish long term blood pressure treatment goals. Principal Discharge DX:	Melena  Goal:	Resolved  Instructions for follow-up, activity and diet:	Please follow up with your primary medical doctor.  Secondary Diagnosis:	DM2 (diabetes mellitus, type 2)  Instructions for follow-up, activity and diet:	Your next appointment with endocrinologist ([facility] endocrine ph: [phone])/PMD is -   HgA1C this admission -  Make sure you get your HgA1c checked every three months.  If you take oral diabetes medications, check your blood glucose two times a day.  If you take insulin, check your blood glucose before meals and at bedtime.  It's important not to skip any meals.  Keep a log of your blood glucose results and always take it with you to your doctor appointments.  Keep a list of your current medications including injectables and over the counter medications and bring this medication list with you to all your doctor appointments.  If you have not seen your ophthalmologist this year call for appointment.  Check your feet daily for redness, sores, or openings. Do not self treat. If no improvement in two days call your primary care physician for an appointment.  Low blood sugar (hypoglycemia) is a blood sugar below 70mg/dl. Check your blood sugar if you feel signs/symptoms of hypoglycemia. If your blood sugar is below 70 take 15 grams of carbohydrates (ex 4 oz of apple juice, 3-4 glucose tablets, or 4-6 oz of regular soda) wait 15 minutes and repeat blood sugar to make sure it comes up above 70.  If your blood sugar is above 70 and you are due for a meal, have a meal.  If you are not due for a meal have a snack.  This snack helps keeps your blood sugar at a safe range.  Secondary Diagnosis:	HTN (hypertension)  Instructions for follow-up, activity and diet:	Take medications for your blood pressure as recommended.  Eat a heart healthy diet that is low in saturated fats and salt, and includes whole grains, fruits, vegetables and lean protein   Exercise regularly (consult with your physician or cardiologist first); maintain a heart healthy weight.   If you smoke - quit (A resource to help you stop smoking is the [facility] – phone number [phone].). Continue to follow with your primary physician or cardiologist.   Seek medical help for dizziness, Lightheadedness, Blurry vision, Headache, Chest pain, Shortness of breath  Follow up with your medical doctor to establish long term blood pressure treatment goals. Principal Discharge DX:	Melena  Goal:	Resolved  Instructions for follow-up, activity and diet:	Please follow up with your primary medical doctor.  Secondary Diagnosis:	DM2 (diabetes mellitus, type 2)  Instructions for follow-up, activity and diet:	Your next appointment with endocrinologist ([facility] endocrine ph: [phone])/PMD is -   HgA1C this admission -  Make sure you get your HgA1c checked every three months.  If you take oral diabetes medications, check your blood glucose two times a day.  If you take insulin, check your blood glucose before meals and at bedtime.  It's important not to skip any meals.  Keep a log of your blood glucose results and always take it with you to your doctor appointments.  Keep a list of your current medications including injectables and over the counter medications and bring this medication list with you to all your doctor appointments.  If you have not seen your ophthalmologist this year call for appointment.  Check your feet daily for redness, sores, or openings. Do not self treat. If no improvement in two days call your primary care physician for an appointment.  Low blood sugar (hypoglycemia) is a blood sugar below 70mg/dl. Check your blood sugar if you feel signs/symptoms of hypoglycemia. If your blood sugar is below 70 take 15 grams of carbohydrates (ex 4 oz of apple juice, 3-4 glucose tablets, or 4-6 oz of regular soda) wait 15 minutes and repeat blood sugar to make sure it comes up above 70.  If your blood sugar is above 70 and you are due for a meal, have a meal.  If you are not due for a meal have a snack.  This snack helps keeps your blood sugar at a safe range.  Secondary Diagnosis:	HTN (hypertension)  Instructions for follow-up, activity and diet:	Take medications for your blood pressure as recommended.  Eat a heart healthy diet that is low in saturated fats and salt, and includes whole grains, fruits, vegetables and lean protein   Exercise regularly (consult with your physician or cardiologist first); maintain a heart healthy weight.   If you smoke - quit (A resource to help you stop smoking is the [facility] – phone number [phone].). Continue to follow with your primary physician or cardiologist.   Seek medical help for dizziness, Lightheadedness, Blurry vision, Headache, Chest pain, Shortness of breath  Follow up with your medical doctor to establish long term blood pressure treatment goals. Principal Discharge DX:	Melena  Goal:	Resolved  Instructions for follow-up, activity and diet:	Please follow up with your primary medical doctor.  Secondary Diagnosis:	DM2 (diabetes mellitus, type 2)  Instructions for follow-up, activity and diet:	Your next appointment with endocrinologist ([facility] endocrine ph: [phone])/PMD is -   HgA1C this admission -  Make sure you get your HgA1c checked every three months.  If you take oral diabetes medications, check your blood glucose two times a day.  If you take insulin, check your blood glucose before meals and at bedtime.  It's important not to skip any meals.  Keep a log of your blood glucose results and always take it with you to your doctor appointments.  Keep a list of your current medications including injectables and over the counter medications and bring this medication list with you to all your doctor appointments.  If you have not seen your ophthalmologist this year call for appointment.  Check your feet daily for redness, sores, or openings. Do not self treat. If no improvement in two days call your primary care physician for an appointment.  Low blood sugar (hypoglycemia) is a blood sugar below 70mg/dl. Check your blood sugar if you feel signs/symptoms of hypoglycemia. If your blood sugar is below 70 take 15 grams of carbohydrates (ex 4 oz of apple juice, 3-4 glucose tablets, or 4-6 oz of regular soda) wait 15 minutes and repeat blood sugar to make sure it comes up above 70.  If your blood sugar is above 70 and you are due for a meal, have a meal.  If you are not due for a meal have a snack.  This snack helps keeps your blood sugar at a safe range.  Secondary Diagnosis:	HTN (hypertension)  Instructions for follow-up, activity and diet:	Take medications for your blood pressure as recommended.  Eat a heart healthy diet that is low in saturated fats and salt, and includes whole grains, fruits, vegetables and lean protein   Exercise regularly (consult with your physician or cardiologist first); maintain a heart healthy weight.   If you smoke - quit (A resource to help you stop smoking is the [facility] – phone number [phone].). Continue to follow with your primary physician or cardiologist.   Seek medical help for dizziness, Lightheadedness, Blurry vision, Headache, Chest pain, Shortness of breath  Follow up with your medical doctor to establish long term blood pressure treatment goals. Principal Discharge DX:	Melena  Goal:	Resolved  Instructions for follow-up, activity and diet:	Please follow up with your primary medical doctor.  Secondary Diagnosis:	DM2 (diabetes mellitus, type 2)  Instructions for follow-up, activity and diet:	Your next appointment with endocrinologist ([facility] endocrine ph: [phone])/PMD is -   HgA1C this admission -  Make sure you get your HgA1c checked every three months.  If you take oral diabetes medications, check your blood glucose two times a day.  If you take insulin, check your blood glucose before meals and at bedtime.  It's important not to skip any meals.  Keep a log of your blood glucose results and always take it with you to your doctor appointments.  Keep a list of your current medications including injectables and over the counter medications and bring this medication list with you to all your doctor appointments.  If you have not seen your ophthalmologist this year call for appointment.  Check your feet daily for redness, sores, or openings. Do not self treat. If no improvement in two days call your primary care physician for an appointment.  Low blood sugar (hypoglycemia) is a blood sugar below 70mg/dl. Check your blood sugar if you feel signs/symptoms of hypoglycemia. If your blood sugar is below 70 take 15 grams of carbohydrates (ex 4 oz of apple juice, 3-4 glucose tablets, or 4-6 oz of regular soda) wait 15 minutes and repeat blood sugar to make sure it comes up above 70.  If your blood sugar is above 70 and you are due for a meal, have a meal.  If you are not due for a meal have a snack.  This snack helps keeps your blood sugar at a safe range.  Secondary Diagnosis:	HTN (hypertension)  Instructions for follow-up, activity and diet:	Take medications for your blood pressure as recommended.  Eat a heart healthy diet that is low in saturated fats and salt, and includes whole grains, fruits, vegetables and lean protein   Exercise regularly (consult with your physician or cardiologist first); maintain a heart healthy weight.   If you smoke - quit (A resource to help you stop smoking is the [facility] – phone number [phone].). Continue to follow with your primary physician or cardiologist.   Seek medical help for dizziness, Lightheadedness, Blurry vision, Headache, Chest pain, Shortness of breath  Follow up with your medical doctor to establish long term blood pressure treatment goals.

## 2017-06-26 NOTE — DISCHARGE NOTE ADULT - FINDINGS/TREATMENT
Findings:       No gross lesions were noted in the entire esophagus.       Localized moderate inflammation was found in the gastric antrum. Biopsies were taken with a        cold forceps for histology.       No gross lesions were noted in the duodenal bulb, in the first part of the duodenum and at        2nd part of the duodenum.                                                                                                        Impression:          - No gross lesions in esophagus.                       - Gastritis. Biopsied.                       - No gross lesions in duodenum.  Recommendation:      - Await pathology results.

## 2017-06-26 NOTE — DISCHARGE NOTE ADULT - MEDICATION SUMMARY - MEDICATIONS TO TAKE
I will START or STAY ON the medications listed below when I get home from the hospital:    lisinopril 10 mg oral tablet  -- 1 tab(s) by mouth once a day  -- Indication: For High Blood Pressure    tamsulosin 0.4 mg oral capsule  -- 1 cap(s) by mouth once a day (at bedtime)  -- Indication: For Prostate    atorvastatin 10 mg oral tablet  -- 1 tab(s) by mouth once a day (at bedtime)  -- Indication: For Cholesterol    amLODIPine 10 mg oral tablet  -- 1 tab(s) by mouth once a day  -- Indication: For High Blood Pressure I will START or STAY ON the medications listed below when I get home from the hospital:    lisinopril 10 mg oral tablet  -- 1 tab(s) by mouth once a day  -- Indication: For High Blood Pressure    tamsulosin 0.4 mg oral capsule  -- 1 cap(s) by mouth once a day (at bedtime)  -- Indication: For Prostate    atorvastatin 10 mg oral tablet  -- 1 tab(s) by mouth once a day (at bedtime)  -- Indication: For Cholesterol    amLODIPine 10 mg oral tablet  -- 1 tab(s) by mouth once a day  -- Indication: For High Blood Pressure    pantoprazole 40 mg oral delayed release tablet  -- 1 tab(s) by mouth 2 times a day  -- It is very important that you take or use this exactly as directed.  Do not skip doses or discontinue unless directed by your doctor.  Obtain medical advice before taking any non-prescription drugs as some may affect the action of this medication.  Swallow whole.  Do not crush.    -- Indication: For Gastrointestinal

## 2017-06-26 NOTE — DISCHARGE NOTE ADULT - PLAN OF CARE
Please follow up with your primary medical doctor. Your next appointment with endocrinologist ([facility] endocrine ph: [phone])/PMD is -   HgA1C this admission -  Make sure you get your HgA1c checked every three months.  If you take oral diabetes medications, check your blood glucose two times a day.  If you take insulin, check your blood glucose before meals and at bedtime.  It's important not to skip any meals.  Keep a log of your blood glucose results and always take it with you to your doctor appointments.  Keep a list of your current medications including injectables and over the counter medications and bring this medication list with you to all your doctor appointments.  If you have not seen your ophthalmologist this year call for appointment.  Check your feet daily for redness, sores, or openings. Do not self treat. If no improvement in two days call your primary care physician for an appointment.  Low blood sugar (hypoglycemia) is a blood sugar below 70mg/dl. Check your blood sugar if you feel signs/symptoms of hypoglycemia. If your blood sugar is below 70 take 15 grams of carbohydrates (ex 4 oz of apple juice, 3-4 glucose tablets, or 4-6 oz of regular soda) wait 15 minutes and repeat blood sugar to make sure it comes up above 70.  If your blood sugar is above 70 and you are due for a meal, have a meal.  If you are not due for a meal have a snack.  This snack helps keeps your blood sugar at a safe range. Take medications for your blood pressure as recommended.  Eat a heart healthy diet that is low in saturated fats and salt, and includes whole grains, fruits, vegetables and lean protein   Exercise regularly (consult with your physician or cardiologist first); maintain a heart healthy weight.   If you smoke - quit (A resource to help you stop smoking is the [facility] – phone number 578-142-9606.). Continue to follow with your primary physician or cardiologist.   Seek medical help for dizziness, Lightheadedness, Blurry vision, Headache, Chest pain, Shortness of breath  Follow up with your medical doctor to establish long term blood pressure treatment goals. Resolved

## 2017-06-26 NOTE — DISCHARGE NOTE ADULT - HOSPITAL COURSE
To be completed by attending melena,  anemia, prbc, anemia  from acute gi blood loss, egd normal, if hb stable today, then per gi  dr caban, ok  halie  dc,  f/p gi,  2 days melena,  anemia, prbc, anemia  from acute gi blood loss, egd normal,  capsule  endoscopy  with  colonic  bleed, r gi  dr caban  colonoscopy  with  diverticula,  and  hemorrhoids'  no active  bleeding,   , ok  fro  dc,  f/p gi,  2 days

## 2017-06-26 NOTE — PROGRESS NOTE ADULT - ASSESSMENT
no melena today,  anemia   from  acute  gi  blood  loss, stable,  for  egd  today, if  normal,then  dc if  ok  with  gi

## 2017-06-26 NOTE — DISCHARGE NOTE ADULT - PATIENT PORTAL LINK FT
“You can access the [employer] Patient Portal, offered by [facility], by registering with the following website: [email or web address]”

## 2017-06-27 ENCOUNTER — APPOINTMENT (OUTPATIENT)
Dept: CARDIOLOGY | Facility: CLINIC | Age: 82
End: 2017-06-27

## 2017-06-27 ENCOUNTER — APPOINTMENT (OUTPATIENT)
Dept: CV DIAGNOSITCS | Facility: HOSPITAL | Age: 82
End: 2017-06-27

## 2017-06-27 LAB
HCT VFR BLD CALC: 22.8 % — LOW (ref 39–50)
HGB BLD-MCNC: 7.8 G/DL — LOW (ref 13–17)
MCHC RBC-ENTMCNC: 29.5 PG — SIGNIFICANT CHANGE UP (ref 27–34)
MCHC RBC-ENTMCNC: 34.3 GM/DL — SIGNIFICANT CHANGE UP (ref 32–36)
MCV RBC AUTO: 85.9 FL — SIGNIFICANT CHANGE UP (ref 80–100)
OB PNL STL: POSITIVE
PLATELET # BLD AUTO: 161 K/UL — SIGNIFICANT CHANGE UP (ref 150–400)
RBC # BLD: 2.66 M/UL — LOW (ref 4.2–5.8)
RBC # FLD: 14.1 % — SIGNIFICANT CHANGE UP (ref 10.3–14.5)
SURGICAL PATHOLOGY STUDY: SIGNIFICANT CHANGE UP
WBC # BLD: 8.6 K/UL — SIGNIFICANT CHANGE UP (ref 3.8–10.5)
WBC # FLD AUTO: 8.6 K/UL — SIGNIFICANT CHANGE UP (ref 3.8–10.5)

## 2017-06-27 RX ORDER — ATORVASTATIN CALCIUM 80 MG/1
1 TABLET, FILM COATED ORAL
Qty: 0 | Refills: 0 | COMMUNITY
Start: 2017-06-27

## 2017-06-27 RX ORDER — OMEGA-3 ACID ETHYL ESTERS 1 G
1 CAPSULE ORAL
Qty: 0 | Refills: 0 | COMMUNITY

## 2017-06-27 RX ORDER — MULTIVIT-MIN/FERROUS GLUCONATE 9 MG/15 ML
1 LIQUID (ML) ORAL
Qty: 0 | Refills: 0 | COMMUNITY

## 2017-06-27 RX ORDER — TAMSULOSIN HYDROCHLORIDE 0.4 MG/1
1 CAPSULE ORAL
Qty: 0 | Refills: 0 | COMMUNITY
Start: 2017-06-27

## 2017-06-27 RX ORDER — ASPIRIN/CALCIUM CARB/MAGNESIUM 324 MG
1 TABLET ORAL
Qty: 0 | Refills: 0 | COMMUNITY

## 2017-06-27 RX ORDER — PANTOPRAZOLE SODIUM 20 MG/1
1 TABLET, DELAYED RELEASE ORAL
Qty: 60 | Refills: 0 | OUTPATIENT
Start: 2017-06-27 | End: 2017-07-27

## 2017-06-27 RX ORDER — LISINOPRIL 2.5 MG/1
1 TABLET ORAL
Qty: 0 | Refills: 0 | COMMUNITY
Start: 2017-06-27

## 2017-06-27 RX ORDER — CARVEDILOL PHOSPHATE 80 MG/1
1 CAPSULE, EXTENDED RELEASE ORAL
Qty: 0 | Refills: 0 | COMMUNITY

## 2017-06-27 RX ORDER — LISINOPRIL 2.5 MG/1
1 TABLET ORAL
Qty: 0 | Refills: 0 | COMMUNITY

## 2017-06-27 RX ORDER — AMLODIPINE BESYLATE 2.5 MG/1
1 TABLET ORAL
Qty: 0 | Refills: 0 | COMMUNITY
Start: 2017-06-27

## 2017-06-27 RX ADMIN — Medication 4: at 12:11

## 2017-06-27 RX ADMIN — TAMSULOSIN HYDROCHLORIDE 0.4 MILLIGRAM(S): 0.4 CAPSULE ORAL at 22:22

## 2017-06-27 RX ADMIN — PANTOPRAZOLE SODIUM 40 MILLIGRAM(S): 20 TABLET, DELAYED RELEASE ORAL at 17:15

## 2017-06-27 RX ADMIN — LISINOPRIL 10 MILLIGRAM(S): 2.5 TABLET ORAL at 05:22

## 2017-06-27 RX ADMIN — AMLODIPINE BESYLATE 10 MILLIGRAM(S): 2.5 TABLET ORAL at 05:22

## 2017-06-27 RX ADMIN — ATORVASTATIN CALCIUM 10 MILLIGRAM(S): 80 TABLET, FILM COATED ORAL at 22:22

## 2017-06-27 RX ADMIN — Medication 2: at 08:10

## 2017-06-27 RX ADMIN — PANTOPRAZOLE SODIUM 40 MILLIGRAM(S): 20 TABLET, DELAYED RELEASE ORAL at 05:22

## 2017-06-27 RX ADMIN — Medication 4: at 17:15

## 2017-06-27 NOTE — PROGRESS NOTE ADULT - ASSESSMENT
no melena  . anemia,  cbc  pending, egd  normal, spoke  with dr caban, if rpt  hb stable, then  dc  pt, further  w/p  as op

## 2017-06-28 LAB
ANION GAP SERPL CALC-SCNC: 13 MMOL/L — SIGNIFICANT CHANGE UP (ref 5–17)
BUN SERPL-MCNC: 22 MG/DL — SIGNIFICANT CHANGE UP (ref 7–23)
CALCIUM SERPL-MCNC: 8.5 MG/DL — SIGNIFICANT CHANGE UP (ref 8.4–10.5)
CHLORIDE SERPL-SCNC: 110 MMOL/L — HIGH (ref 96–108)
CO2 SERPL-SCNC: 19 MMOL/L — LOW (ref 22–31)
CREAT SERPL-MCNC: 1.49 MG/DL — HIGH (ref 0.5–1.3)
GLUCOSE SERPL-MCNC: 147 MG/DL — HIGH (ref 70–99)
HCT VFR BLD CALC: 23.6 % — LOW (ref 39–50)
HGB BLD-MCNC: 8.1 G/DL — LOW (ref 13–17)
MCHC RBC-ENTMCNC: 29.3 PG — SIGNIFICANT CHANGE UP (ref 27–34)
MCHC RBC-ENTMCNC: 34.3 GM/DL — SIGNIFICANT CHANGE UP (ref 32–36)
MCV RBC AUTO: 85.4 FL — SIGNIFICANT CHANGE UP (ref 80–100)
PLATELET # BLD AUTO: 143 K/UL — LOW (ref 150–400)
POTASSIUM SERPL-MCNC: 4.6 MMOL/L — SIGNIFICANT CHANGE UP (ref 3.5–5.3)
POTASSIUM SERPL-SCNC: 4.6 MMOL/L — SIGNIFICANT CHANGE UP (ref 3.5–5.3)
RBC # BLD: 2.77 M/UL — LOW (ref 4.2–5.8)
RBC # FLD: 14 % — SIGNIFICANT CHANGE UP (ref 10.3–14.5)
SODIUM SERPL-SCNC: 142 MMOL/L — SIGNIFICANT CHANGE UP (ref 135–145)
WBC # BLD: 10.3 K/UL — SIGNIFICANT CHANGE UP (ref 3.8–10.5)
WBC # FLD AUTO: 10.3 K/UL — SIGNIFICANT CHANGE UP (ref 3.8–10.5)

## 2017-06-28 RX ADMIN — ATORVASTATIN CALCIUM 10 MILLIGRAM(S): 80 TABLET, FILM COATED ORAL at 21:23

## 2017-06-28 RX ADMIN — TAMSULOSIN HYDROCHLORIDE 0.4 MILLIGRAM(S): 0.4 CAPSULE ORAL at 21:23

## 2017-06-28 RX ADMIN — Medication 2: at 17:01

## 2017-06-28 RX ADMIN — AMLODIPINE BESYLATE 10 MILLIGRAM(S): 2.5 TABLET ORAL at 06:02

## 2017-06-28 RX ADMIN — PANTOPRAZOLE SODIUM 40 MILLIGRAM(S): 20 TABLET, DELAYED RELEASE ORAL at 17:01

## 2017-06-28 RX ADMIN — PANTOPRAZOLE SODIUM 40 MILLIGRAM(S): 20 TABLET, DELAYED RELEASE ORAL at 06:03

## 2017-06-28 RX ADMIN — SODIUM CHLORIDE 40 MILLILITER(S): 9 INJECTION INTRAMUSCULAR; INTRAVENOUS; SUBCUTANEOUS at 20:11

## 2017-06-28 RX ADMIN — LISINOPRIL 10 MILLIGRAM(S): 2.5 TABLET ORAL at 06:02

## 2017-06-28 NOTE — CHART NOTE - NSCHARTNOTEFT_GEN_A_CORE
Risks of video capsule endoscopy explained, including risk of retained capsule, potentially requiring surgery for removal.  Patient understands and agrees to risks.    Capsule ID: [id number]    Capsule swallowed at: 8:00AM 6/28/17    NPO now. Resume diet at: 12pm 6/28/17    Equipment can be removed at: 8pm    GI fellow will retrieve equipment.    Do not perform MRI until passage of capsule

## 2017-06-28 NOTE — PROGRESS NOTE ADULT - ASSESSMENT
hb of  8 today, no melena, cleared by  gi , dc  today, f/p  gi, 1 week hb of  8 today, no melena,  capsule endoscopy,  dc  today, f/p  gi, 1 week

## 2017-06-28 NOTE — PROGRESS NOTE ADULT - PROBLEM SELECTOR PLAN 2
- S/P EGD w/ gastritis, biopsied -- negative for h. pylori  - H/H and cbc monitoring - transfuse prn  - Repeat FOBT Positive  - Continue ppi 40 mg iv BID  - Diet as tolerated   - Capsule study in progress --- awaiting results - S/P EGD w/ gastritis, biopsied -- negative for h. pylori  - H/H and cbc monitoring - transfuse prn  - Repeat FOBT Positive  - Continue ppi 40 mg iv BID  - Diet as tolerated   - Capsule study in progress --- awaiting results  - NO MRI until capsule passage

## 2017-06-29 LAB
ANION GAP SERPL CALC-SCNC: 12 MMOL/L — SIGNIFICANT CHANGE UP (ref 5–17)
BLD GP AB SCN SERPL QL: NEGATIVE — SIGNIFICANT CHANGE UP
BUN SERPL-MCNC: 19 MG/DL — SIGNIFICANT CHANGE UP (ref 7–23)
CALCIUM SERPL-MCNC: 8.2 MG/DL — LOW (ref 8.4–10.5)
CHLORIDE SERPL-SCNC: 105 MMOL/L — SIGNIFICANT CHANGE UP (ref 96–108)
CO2 SERPL-SCNC: 20 MMOL/L — LOW (ref 22–31)
CREAT SERPL-MCNC: 1.08 MG/DL — SIGNIFICANT CHANGE UP (ref 0.5–1.3)
GLUCOSE SERPL-MCNC: 160 MG/DL — HIGH (ref 70–99)
HCT VFR BLD CALC: 21.6 % — LOW (ref 39–50)
HCT VFR BLD CALC: 25.1 % — LOW (ref 39–50)
HGB BLD-MCNC: 7.1 G/DL — LOW (ref 13–17)
HGB BLD-MCNC: 8.7 G/DL — LOW (ref 13–17)
MCHC RBC-ENTMCNC: 26.4 PG — LOW (ref 27–34)
MCHC RBC-ENTMCNC: 29.6 PG — SIGNIFICANT CHANGE UP (ref 27–34)
MCHC RBC-ENTMCNC: 32.9 GM/DL — SIGNIFICANT CHANGE UP (ref 32–36)
MCHC RBC-ENTMCNC: 34.8 GM/DL — SIGNIFICANT CHANGE UP (ref 32–36)
MCV RBC AUTO: 80.3 FL — SIGNIFICANT CHANGE UP (ref 80–100)
MCV RBC AUTO: 84.8 FL — SIGNIFICANT CHANGE UP (ref 80–100)
PLATELET # BLD AUTO: 161 K/UL — SIGNIFICANT CHANGE UP (ref 150–400)
PLATELET # BLD AUTO: 174 K/UL — SIGNIFICANT CHANGE UP (ref 150–400)
POTASSIUM SERPL-MCNC: 4.2 MMOL/L — SIGNIFICANT CHANGE UP (ref 3.5–5.3)
POTASSIUM SERPL-SCNC: 4.2 MMOL/L — SIGNIFICANT CHANGE UP (ref 3.5–5.3)
RBC # BLD: 2.69 M/UL — LOW (ref 4.2–5.8)
RBC # BLD: 2.95 M/UL — LOW (ref 4.2–5.8)
RBC # FLD: 13.7 % — SIGNIFICANT CHANGE UP (ref 10.3–14.5)
RBC # FLD: 15 % — HIGH (ref 10.3–14.5)
RH IG SCN BLD-IMP: POSITIVE — SIGNIFICANT CHANGE UP
SODIUM SERPL-SCNC: 137 MMOL/L — SIGNIFICANT CHANGE UP (ref 135–145)
WBC # BLD: 10.1 K/UL — SIGNIFICANT CHANGE UP (ref 3.8–10.5)
WBC # BLD: 8.87 K/UL — SIGNIFICANT CHANGE UP (ref 3.8–10.5)
WBC # FLD AUTO: 10.1 K/UL — SIGNIFICANT CHANGE UP (ref 3.8–10.5)
WBC # FLD AUTO: 8.87 K/UL — SIGNIFICANT CHANGE UP (ref 3.8–10.5)

## 2017-06-29 RX ADMIN — Medication 2: at 08:03

## 2017-06-29 RX ADMIN — Medication 6: at 12:20

## 2017-06-29 RX ADMIN — Medication 6: at 17:01

## 2017-06-29 RX ADMIN — PANTOPRAZOLE SODIUM 40 MILLIGRAM(S): 20 TABLET, DELAYED RELEASE ORAL at 05:44

## 2017-06-29 RX ADMIN — ATORVASTATIN CALCIUM 10 MILLIGRAM(S): 80 TABLET, FILM COATED ORAL at 21:27

## 2017-06-29 RX ADMIN — AMLODIPINE BESYLATE 10 MILLIGRAM(S): 2.5 TABLET ORAL at 05:44

## 2017-06-29 RX ADMIN — LISINOPRIL 10 MILLIGRAM(S): 2.5 TABLET ORAL at 05:44

## 2017-06-29 RX ADMIN — PANTOPRAZOLE SODIUM 40 MILLIGRAM(S): 20 TABLET, DELAYED RELEASE ORAL at 17:03

## 2017-06-29 RX ADMIN — TAMSULOSIN HYDROCHLORIDE 0.4 MILLIGRAM(S): 0.4 CAPSULE ORAL at 21:28

## 2017-06-29 NOTE — PROGRESS NOTE ADULT - PROBLEM SELECTOR PLAN 2
- S/P EGD w/ gastritis, biopsied -- negative for h. pylori  - H/H and cbc monitoring - transfuse prn   - Repeat FOBT Positive  - Continue ppi 40 mg iv BID  - Diet as tolerated   - Capsule study --- awaiting results  - NO MRI until capsule passage

## 2017-06-29 NOTE — PROGRESS NOTE ADULT - ASSESSMENT
s.p melena,  egd, no bleed noted   capsule  endoscopy  in  progress,  folow  hb.  awaiting gi  clearance

## 2017-06-30 LAB
ANION GAP SERPL CALC-SCNC: 16 MMOL/L — SIGNIFICANT CHANGE UP (ref 5–17)
BUN SERPL-MCNC: 15 MG/DL — SIGNIFICANT CHANGE UP (ref 7–23)
CALCIUM SERPL-MCNC: 8.6 MG/DL — SIGNIFICANT CHANGE UP (ref 8.4–10.5)
CHLORIDE SERPL-SCNC: 108 MMOL/L — SIGNIFICANT CHANGE UP (ref 96–108)
CO2 SERPL-SCNC: 18 MMOL/L — LOW (ref 22–31)
CREAT SERPL-MCNC: 1.33 MG/DL — HIGH (ref 0.5–1.3)
GLUCOSE SERPL-MCNC: 165 MG/DL — HIGH (ref 70–99)
HCT VFR BLD CALC: 24.3 % — LOW (ref 39–50)
HGB BLD-MCNC: 8 G/DL — LOW (ref 13–17)
MCHC RBC-ENTMCNC: 26.6 PG — LOW (ref 27–34)
MCHC RBC-ENTMCNC: 32.9 GM/DL — SIGNIFICANT CHANGE UP (ref 32–36)
MCV RBC AUTO: 80.7 FL — SIGNIFICANT CHANGE UP (ref 80–100)
PLATELET # BLD AUTO: 172 K/UL — SIGNIFICANT CHANGE UP (ref 150–400)
POTASSIUM SERPL-MCNC: 4 MMOL/L — SIGNIFICANT CHANGE UP (ref 3.5–5.3)
POTASSIUM SERPL-SCNC: 4 MMOL/L — SIGNIFICANT CHANGE UP (ref 3.5–5.3)
RBC # BLD: 3.01 M/UL — LOW (ref 4.2–5.8)
RBC # FLD: 14.8 % — HIGH (ref 10.3–14.5)
SODIUM SERPL-SCNC: 142 MMOL/L — SIGNIFICANT CHANGE UP (ref 135–145)
WBC # BLD: 8.62 K/UL — SIGNIFICANT CHANGE UP (ref 3.8–10.5)
WBC # FLD AUTO: 8.62 K/UL — SIGNIFICANT CHANGE UP (ref 3.8–10.5)

## 2017-06-30 RX ADMIN — AMLODIPINE BESYLATE 10 MILLIGRAM(S): 2.5 TABLET ORAL at 06:58

## 2017-06-30 RX ADMIN — ATORVASTATIN CALCIUM 10 MILLIGRAM(S): 80 TABLET, FILM COATED ORAL at 22:55

## 2017-06-30 RX ADMIN — PANTOPRAZOLE SODIUM 40 MILLIGRAM(S): 20 TABLET, DELAYED RELEASE ORAL at 17:12

## 2017-06-30 RX ADMIN — Medication 2: at 17:12

## 2017-06-30 RX ADMIN — TAMSULOSIN HYDROCHLORIDE 0.4 MILLIGRAM(S): 0.4 CAPSULE ORAL at 22:55

## 2017-06-30 RX ADMIN — SODIUM CHLORIDE 40 MILLILITER(S): 9 INJECTION INTRAMUSCULAR; INTRAVENOUS; SUBCUTANEOUS at 18:47

## 2017-06-30 RX ADMIN — PANTOPRAZOLE SODIUM 40 MILLIGRAM(S): 20 TABLET, DELAYED RELEASE ORAL at 06:58

## 2017-06-30 RX ADMIN — Medication 6: at 12:07

## 2017-06-30 RX ADMIN — Medication 2: at 08:19

## 2017-06-30 RX ADMIN — LISINOPRIL 10 MILLIGRAM(S): 2.5 TABLET ORAL at 06:58

## 2017-06-30 NOTE — PROGRESS NOTE ADULT - ASSESSMENT
gi  bleed, s/p  M2A  CAPSULE,  WITH ACTIVE BLEEDING  FROM COLON    MONITOR  HB   PRBC , PRN   COLONOSCOPY, MONDAY

## 2017-07-01 LAB
ANION GAP SERPL CALC-SCNC: 16 MMOL/L — SIGNIFICANT CHANGE UP (ref 5–17)
BUN SERPL-MCNC: 13 MG/DL — SIGNIFICANT CHANGE UP (ref 7–23)
CALCIUM SERPL-MCNC: 8.8 MG/DL — SIGNIFICANT CHANGE UP (ref 8.4–10.5)
CHLORIDE SERPL-SCNC: 107 MMOL/L — SIGNIFICANT CHANGE UP (ref 96–108)
CO2 SERPL-SCNC: 18 MMOL/L — LOW (ref 22–31)
CREAT SERPL-MCNC: 1.32 MG/DL — HIGH (ref 0.5–1.3)
GLUCOSE SERPL-MCNC: 156 MG/DL — HIGH (ref 70–99)
HCT VFR BLD CALC: 24.4 % — LOW (ref 39–50)
HGB BLD-MCNC: 7.8 G/DL — LOW (ref 13–17)
MCHC RBC-ENTMCNC: 26.1 PG — LOW (ref 27–34)
MCHC RBC-ENTMCNC: 32 GM/DL — SIGNIFICANT CHANGE UP (ref 32–36)
MCV RBC AUTO: 81.6 FL — SIGNIFICANT CHANGE UP (ref 80–100)
PLATELET # BLD AUTO: 185 K/UL — SIGNIFICANT CHANGE UP (ref 150–400)
POTASSIUM SERPL-MCNC: 4.2 MMOL/L — SIGNIFICANT CHANGE UP (ref 3.5–5.3)
POTASSIUM SERPL-SCNC: 4.2 MMOL/L — SIGNIFICANT CHANGE UP (ref 3.5–5.3)
RBC # BLD: 2.99 M/UL — LOW (ref 4.2–5.8)
RBC # FLD: 14.6 % — HIGH (ref 10.3–14.5)
SODIUM SERPL-SCNC: 141 MMOL/L — SIGNIFICANT CHANGE UP (ref 135–145)
WBC # BLD: 8.98 K/UL — SIGNIFICANT CHANGE UP (ref 3.8–10.5)
WBC # FLD AUTO: 8.98 K/UL — SIGNIFICANT CHANGE UP (ref 3.8–10.5)

## 2017-07-01 RX ORDER — SODIUM CHLORIDE 9 MG/ML
1000 INJECTION INTRAMUSCULAR; INTRAVENOUS; SUBCUTANEOUS
Qty: 0 | Refills: 0 | Status: DISCONTINUED | OUTPATIENT
Start: 2017-07-01 | End: 2017-07-04

## 2017-07-01 RX ADMIN — LISINOPRIL 10 MILLIGRAM(S): 2.5 TABLET ORAL at 05:21

## 2017-07-01 RX ADMIN — PANTOPRAZOLE SODIUM 40 MILLIGRAM(S): 20 TABLET, DELAYED RELEASE ORAL at 18:24

## 2017-07-01 RX ADMIN — Medication 2: at 08:22

## 2017-07-01 RX ADMIN — Medication 4: at 18:24

## 2017-07-01 RX ADMIN — SODIUM CHLORIDE 40 MILLILITER(S): 9 INJECTION INTRAMUSCULAR; INTRAVENOUS; SUBCUTANEOUS at 23:49

## 2017-07-01 RX ADMIN — PANTOPRAZOLE SODIUM 40 MILLIGRAM(S): 20 TABLET, DELAYED RELEASE ORAL at 05:21

## 2017-07-01 RX ADMIN — Medication 6: at 12:15

## 2017-07-01 RX ADMIN — ATORVASTATIN CALCIUM 10 MILLIGRAM(S): 80 TABLET, FILM COATED ORAL at 22:02

## 2017-07-01 RX ADMIN — TAMSULOSIN HYDROCHLORIDE 0.4 MILLIGRAM(S): 0.4 CAPSULE ORAL at 22:02

## 2017-07-01 RX ADMIN — AMLODIPINE BESYLATE 10 MILLIGRAM(S): 2.5 TABLET ORAL at 05:21

## 2017-07-01 NOTE — PROGRESS NOTE ADULT - ASSESSMENT
still with  oozing melena per  pt   hb of 8 today.  stable   follow   hb in am  for colonoscopy on monday,  capsule  study  with  colonic  bleeding

## 2017-07-01 NOTE — PROGRESS NOTE ADULT - PROBLEM SELECTOR PLAN 2
- S/P EGD w/ gastritis, biopsied -- negative for h. pylori  - H/H and cbc monitoring - transfuse prn   - Repeat FOBT Positive  - Continue ppi 40 mg iv BID  - Diet as tolerated   - Capsule study --- + for colonic bleed   - NO MRI until capsule passage  - Plan for colonoscopy on Monday * NPO P MN on Sunday

## 2017-07-02 LAB
ANION GAP SERPL CALC-SCNC: 14 MMOL/L — SIGNIFICANT CHANGE UP (ref 5–17)
BUN SERPL-MCNC: 13 MG/DL — SIGNIFICANT CHANGE UP (ref 7–23)
CALCIUM SERPL-MCNC: 9 MG/DL — SIGNIFICANT CHANGE UP (ref 8.4–10.5)
CHLORIDE SERPL-SCNC: 106 MMOL/L — SIGNIFICANT CHANGE UP (ref 96–108)
CO2 SERPL-SCNC: 20 MMOL/L — LOW (ref 22–31)
CREAT SERPL-MCNC: 1.27 MG/DL — SIGNIFICANT CHANGE UP (ref 0.5–1.3)
GLUCOSE SERPL-MCNC: 143 MG/DL — HIGH (ref 70–99)
HCT VFR BLD CALC: 23.8 % — LOW (ref 39–50)
HCT VFR BLD CALC: 34.8 % — LOW (ref 39–50)
HGB BLD-MCNC: 12.1 G/DL — LOW (ref 13–17)
HGB BLD-MCNC: 7.7 G/DL — LOW (ref 13–17)
MCHC RBC-ENTMCNC: 26.2 PG — LOW (ref 27–34)
MCHC RBC-ENTMCNC: 29.9 PG — SIGNIFICANT CHANGE UP (ref 27–34)
MCHC RBC-ENTMCNC: 32.4 GM/DL — SIGNIFICANT CHANGE UP (ref 32–36)
MCHC RBC-ENTMCNC: 34.7 GM/DL — SIGNIFICANT CHANGE UP (ref 32–36)
MCV RBC AUTO: 81 FL — SIGNIFICANT CHANGE UP (ref 80–100)
MCV RBC AUTO: 86 FL — SIGNIFICANT CHANGE UP (ref 80–100)
PLATELET # BLD AUTO: 167 K/UL — SIGNIFICANT CHANGE UP (ref 150–400)
PLATELET # BLD AUTO: 186 K/UL — SIGNIFICANT CHANGE UP (ref 150–400)
POTASSIUM SERPL-MCNC: 4.2 MMOL/L — SIGNIFICANT CHANGE UP (ref 3.5–5.3)
POTASSIUM SERPL-SCNC: 4.2 MMOL/L — SIGNIFICANT CHANGE UP (ref 3.5–5.3)
RBC # BLD: 2.94 M/UL — LOW (ref 4.2–5.8)
RBC # BLD: 4.05 M/UL — LOW (ref 4.2–5.8)
RBC # FLD: 13.9 % — SIGNIFICANT CHANGE UP (ref 10.3–14.5)
RBC # FLD: 14.7 % — HIGH (ref 10.3–14.5)
SODIUM SERPL-SCNC: 140 MMOL/L — SIGNIFICANT CHANGE UP (ref 135–145)
WBC # BLD: 12.3 K/UL — HIGH (ref 3.8–10.5)
WBC # BLD: 8.81 K/UL — SIGNIFICANT CHANGE UP (ref 3.8–10.5)
WBC # FLD AUTO: 12.3 K/UL — HIGH (ref 3.8–10.5)
WBC # FLD AUTO: 8.81 K/UL — SIGNIFICANT CHANGE UP (ref 3.8–10.5)

## 2017-07-02 RX ORDER — FUROSEMIDE 40 MG
20 TABLET ORAL ONCE
Qty: 0 | Refills: 0 | Status: COMPLETED | OUTPATIENT
Start: 2017-07-02 | End: 2017-07-02

## 2017-07-02 RX ORDER — SOD SULF/SODIUM/NAHCO3/KCL/PEG
1 SOLUTION, RECONSTITUTED, ORAL ORAL EVERY 4 HOURS
Qty: 0 | Refills: 0 | Status: COMPLETED | OUTPATIENT
Start: 2017-07-02 | End: 2017-07-02

## 2017-07-02 RX ADMIN — Medication 10 MILLIGRAM(S): at 20:18

## 2017-07-02 RX ADMIN — Medication 10 MILLIGRAM(S): at 16:50

## 2017-07-02 RX ADMIN — ATORVASTATIN CALCIUM 10 MILLIGRAM(S): 80 TABLET, FILM COATED ORAL at 21:21

## 2017-07-02 RX ADMIN — Medication 1 LITER(S): at 23:27

## 2017-07-02 RX ADMIN — LISINOPRIL 10 MILLIGRAM(S): 2.5 TABLET ORAL at 05:44

## 2017-07-02 RX ADMIN — PANTOPRAZOLE SODIUM 40 MILLIGRAM(S): 20 TABLET, DELAYED RELEASE ORAL at 20:18

## 2017-07-02 RX ADMIN — Medication 20 MILLIGRAM(S): at 14:44

## 2017-07-02 RX ADMIN — Medication 1 LITER(S): at 18:20

## 2017-07-02 RX ADMIN — TAMSULOSIN HYDROCHLORIDE 0.4 MILLIGRAM(S): 0.4 CAPSULE ORAL at 21:21

## 2017-07-02 RX ADMIN — Medication: at 13:03

## 2017-07-02 RX ADMIN — Medication 2: at 08:27

## 2017-07-02 RX ADMIN — Medication 3: at 21:21

## 2017-07-02 RX ADMIN — Medication 10: at 16:50

## 2017-07-02 RX ADMIN — PANTOPRAZOLE SODIUM 40 MILLIGRAM(S): 20 TABLET, DELAYED RELEASE ORAL at 05:44

## 2017-07-02 RX ADMIN — AMLODIPINE BESYLATE 10 MILLIGRAM(S): 2.5 TABLET ORAL at 05:44

## 2017-07-02 NOTE — PROGRESS NOTE ADULT - ASSESSMENT
pt  with  gi  bleed,   FOR  COLONOSCOPY IN AM. MONITOR  HB,  PENDING TODAY. PRBC  AS  NEEDED  ON IV PPI    HTN STABLE

## 2017-07-03 LAB
ANION GAP SERPL CALC-SCNC: 16 MMOL/L — SIGNIFICANT CHANGE UP (ref 5–17)
BUN SERPL-MCNC: 12 MG/DL — SIGNIFICANT CHANGE UP (ref 7–23)
CALCIUM SERPL-MCNC: 9.5 MG/DL — SIGNIFICANT CHANGE UP (ref 8.4–10.5)
CHLORIDE SERPL-SCNC: 106 MMOL/L — SIGNIFICANT CHANGE UP (ref 96–108)
CO2 SERPL-SCNC: 19 MMOL/L — LOW (ref 22–31)
CREAT SERPL-MCNC: 1.3 MG/DL — SIGNIFICANT CHANGE UP (ref 0.5–1.3)
GLUCOSE SERPL-MCNC: 141 MG/DL — HIGH (ref 70–99)
HCT VFR BLD CALC: 32.9 % — LOW (ref 39–50)
HGB BLD-MCNC: 11.2 G/DL — LOW (ref 13–17)
MCHC RBC-ENTMCNC: 29.2 PG — SIGNIFICANT CHANGE UP (ref 27–34)
MCHC RBC-ENTMCNC: 33.9 GM/DL — SIGNIFICANT CHANGE UP (ref 32–36)
MCV RBC AUTO: 86.1 FL — SIGNIFICANT CHANGE UP (ref 80–100)
PLATELET # BLD AUTO: 168 K/UL — SIGNIFICANT CHANGE UP (ref 150–400)
POTASSIUM SERPL-MCNC: 3.9 MMOL/L — SIGNIFICANT CHANGE UP (ref 3.5–5.3)
POTASSIUM SERPL-SCNC: 3.9 MMOL/L — SIGNIFICANT CHANGE UP (ref 3.5–5.3)
RBC # BLD: 3.82 M/UL — LOW (ref 4.2–5.8)
RBC # FLD: 13.9 % — SIGNIFICANT CHANGE UP (ref 10.3–14.5)
SODIUM SERPL-SCNC: 141 MMOL/L — SIGNIFICANT CHANGE UP (ref 135–145)
WBC # BLD: 8.9 K/UL — SIGNIFICANT CHANGE UP (ref 3.8–10.5)
WBC # FLD AUTO: 8.9 K/UL — SIGNIFICANT CHANGE UP (ref 3.8–10.5)

## 2017-07-03 RX ADMIN — SODIUM CHLORIDE 40 MILLILITER(S): 9 INJECTION INTRAMUSCULAR; INTRAVENOUS; SUBCUTANEOUS at 09:06

## 2017-07-03 RX ADMIN — LISINOPRIL 10 MILLIGRAM(S): 2.5 TABLET ORAL at 06:14

## 2017-07-03 RX ADMIN — Medication 2: at 14:34

## 2017-07-03 RX ADMIN — TAMSULOSIN HYDROCHLORIDE 0.4 MILLIGRAM(S): 0.4 CAPSULE ORAL at 21:43

## 2017-07-03 RX ADMIN — PANTOPRAZOLE SODIUM 40 MILLIGRAM(S): 20 TABLET, DELAYED RELEASE ORAL at 19:03

## 2017-07-03 RX ADMIN — Medication 6: at 16:52

## 2017-07-03 RX ADMIN — PANTOPRAZOLE SODIUM 40 MILLIGRAM(S): 20 TABLET, DELAYED RELEASE ORAL at 06:14

## 2017-07-03 RX ADMIN — ATORVASTATIN CALCIUM 10 MILLIGRAM(S): 80 TABLET, FILM COATED ORAL at 21:43

## 2017-07-03 RX ADMIN — AMLODIPINE BESYLATE 10 MILLIGRAM(S): 2.5 TABLET ORAL at 06:14

## 2017-07-03 RX ADMIN — SODIUM CHLORIDE 40 MILLILITER(S): 9 INJECTION INTRAMUSCULAR; INTRAVENOUS; SUBCUTANEOUS at 06:19

## 2017-07-04 VITALS
SYSTOLIC BLOOD PRESSURE: 135 MMHG | HEART RATE: 70 BPM | OXYGEN SATURATION: 99 % | RESPIRATION RATE: 17 BRPM | TEMPERATURE: 98 F | DIASTOLIC BLOOD PRESSURE: 51 MMHG

## 2017-07-04 PROCEDURE — 80053 COMPREHEN METABOLIC PANEL: CPT

## 2017-07-04 PROCEDURE — 93005 ELECTROCARDIOGRAM TRACING: CPT

## 2017-07-04 PROCEDURE — 82550 ASSAY OF CK (CPK): CPT

## 2017-07-04 PROCEDURE — 86900 BLOOD TYPING SEROLOGIC ABO: CPT

## 2017-07-04 PROCEDURE — 84100 ASSAY OF PHOSPHORUS: CPT

## 2017-07-04 PROCEDURE — 86923 COMPATIBILITY TEST ELECTRIC: CPT

## 2017-07-04 PROCEDURE — 83735 ASSAY OF MAGNESIUM: CPT

## 2017-07-04 PROCEDURE — P9016: CPT

## 2017-07-04 PROCEDURE — 82272 OCCULT BLD FECES 1-3 TESTS: CPT

## 2017-07-04 PROCEDURE — 82553 CREATINE MB FRACTION: CPT

## 2017-07-04 PROCEDURE — 83036 HEMOGLOBIN GLYCOSYLATED A1C: CPT

## 2017-07-04 PROCEDURE — 84484 ASSAY OF TROPONIN QUANT: CPT

## 2017-07-04 PROCEDURE — 85730 THROMBOPLASTIN TIME PARTIAL: CPT

## 2017-07-04 PROCEDURE — 86901 BLOOD TYPING SEROLOGIC RH(D): CPT

## 2017-07-04 PROCEDURE — 81001 URINALYSIS AUTO W/SCOPE: CPT

## 2017-07-04 PROCEDURE — 85027 COMPLETE CBC AUTOMATED: CPT

## 2017-07-04 PROCEDURE — 85610 PROTHROMBIN TIME: CPT

## 2017-07-04 PROCEDURE — 99285 EMERGENCY DEPT VISIT HI MDM: CPT | Mod: 25

## 2017-07-04 PROCEDURE — 80048 BASIC METABOLIC PNL TOTAL CA: CPT

## 2017-07-04 PROCEDURE — 86850 RBC ANTIBODY SCREEN: CPT

## 2017-07-04 PROCEDURE — 71045 X-RAY EXAM CHEST 1 VIEW: CPT

## 2017-07-04 PROCEDURE — 36430 TRANSFUSION BLD/BLD COMPNT: CPT

## 2017-07-04 RX ORDER — PANTOPRAZOLE SODIUM 20 MG/1
40 TABLET, DELAYED RELEASE ORAL
Qty: 0 | Refills: 0 | Status: DISCONTINUED | OUTPATIENT
Start: 2017-07-04 | End: 2017-07-04

## 2017-07-04 RX ADMIN — SODIUM CHLORIDE 40 MILLILITER(S): 9 INJECTION INTRAMUSCULAR; INTRAVENOUS; SUBCUTANEOUS at 09:42

## 2017-07-04 RX ADMIN — Medication 2: at 09:41

## 2017-07-04 RX ADMIN — AMLODIPINE BESYLATE 10 MILLIGRAM(S): 2.5 TABLET ORAL at 06:33

## 2017-07-04 RX ADMIN — PANTOPRAZOLE SODIUM 40 MILLIGRAM(S): 20 TABLET, DELAYED RELEASE ORAL at 06:33

## 2017-07-04 RX ADMIN — SODIUM CHLORIDE 40 MILLILITER(S): 9 INJECTION INTRAMUSCULAR; INTRAVENOUS; SUBCUTANEOUS at 06:33

## 2017-07-04 RX ADMIN — LISINOPRIL 10 MILLIGRAM(S): 2.5 TABLET ORAL at 06:33

## 2017-07-04 NOTE — PROGRESS NOTE ADULT - SUBJECTIVE AND OBJECTIVE BOX
- Patient seen and examined.  - In summary, patient is a 89y year old man who presented with melena (2017 12:04)  - Today, patient is without complaints.         *****MEDICATIONS:    MEDICATIONS  (STANDING):    MEDICATIONS  (STANDING):  pantoprazole  Injectable 40milliGRAM(s) IV Push two times a day  lisinopril 10milliGRAM(s) Oral daily  tamsulosin 0.4milliGRAM(s) Oral at bedtime  atorvastatin 10milliGRAM(s) Oral at bedtime  amLODIPine   Tablet 10milliGRAM(s) Oral daily  sodium chloride 0.9%. 1000milliLiter(s) IV Continuous <Continuous>  insulin lispro (HumaLOG) corrective regimen sliding scale  SubCutaneous three times a day before meals  insulin lispro (HumaLOG) corrective regimen sliding scale  SubCutaneous at bedtime  dextrose 5%. 1000milliLiter(s) IV Continuous <Continuous>  dextrose 50% Injectable 12.5Gram(s) IV Push once  dextrose 50% Injectable 25Gram(s) IV Push once  dextrose 50% Injectable 25Gram(s) IV Push once    MEDICATIONS  (PRN):  dextrose Gel 1Dose(s) Oral once PRN Blood Glucose LESS THAN 70 milliGRAM(s)/deciliter  glucagon  Injectable 1milliGRAM(s) IntraMuscular once PRN Glucose LESS THAN 70 milligrams/deciliter  acetaminophen   Tablet. 650milliGRAM(s) Oral every 6 hours PRN Moderate Pain (4 - 6)           ***** REVIEW OF SYSTEM:  GEN: no fever, no chills, no pain  RESP: no SOB, no cough, no sputum  CVS: no chest pain, no palpitations, no edema  GI: no abdominal pain, no nausea, no vomiting, no constipation, no diarrhea  : no dysurea, no frequency  NEURO: no headache, no diziness  PSYCH: no depression, not anxious  Derm : no itching, no rash         ***** VITAL SIGNS:  T(F): 97.8, Max: 98.1 (06-25 @ 10:20)  HR: 74 (72 - 84)  BP: 121/50 (115/52 - 141/63)  RR: 18 (18 - 18)  SpO2: 100% (99% - 100%)  Wt(kg): --  ,   I&O's Summary    I & Os for current day (as of 2017 10:13)  =============================================  IN: 1435 ml / OUT: 1250 ml / NET: 185 ml           *****PHYSICAL EXAM:  GEN: A&O X 3 , NAD , comfortable  HEENT: NCAT, EOMI, MMM, no icterus  NECK: Supple, No JVD  CVS: S1S2 , regular , No M/R/G appreciated  PULM: CTA B/L,  no W/R/R appreciated  ABD.: soft. non tender, non distended,  bowel sounds present  Extrem: intact pulses , no edema noted  Derm: No rash or ecchymosis noted  PSYCH: normal mood, no depression, not anxious         *****LAB AND IMAGIN.0    8.11  )-----------( 170      ( 2017 07:02 )             24.6               06-26    137  |  107  |  22  ----------------------------<  145<H>  4.3   |  18<L>  |  1.33<H>    Ca    8.8      2017 07:01  Phos  3.1     06-24  Mg     1.9     06-24    TPro  6.9  /  Alb  3.5  /  TBili  0.3  /  DBili  x   /  AST  28  /  ALT  17  /  AlkPhos  58  06-24    PT/INR - ( 2017 07:02 )   PT: 12.7 sec;   INR: 1.12 ratio         PTT - ( 2017 07:02 )  PTT:26.2 sec       CARDIAC MARKERS ( 2017 10:58 )  x     / <0.01 ng/mL / 125 U/L / x     / 2.3 ng/mL            Urinalysis Basic - ( 2017 14:21 )    Color: Yellow / Appearance: Clear / S.016 / pH: x  Gluc: x / Ketone: Negative  / Bili: Negative / Urobili: Negative mg/dL   Blood: x / Protein: Negative mg/dL / Nitrite: Negative   Leuk Esterase: Negative / RBC: 1 /HPF / WBC 0 /HPF   Sq Epi: x / Non Sq Epi: 1 /HPF / Bacteria: Negative      [All pertinent recent Imaging/Reports reviewed]         *****A S S E S S M E N T   A N D   P L A N :  89M GERARDO EDWARD 2015, CAD  anti-plt held  f/u GI  await EGD  transfuse to keep Hgb >8  tele        __________________________  A. JUJU Nunez
- Patient seen and examined.  - In summary, patient is a 89y year old man who presented with melena (2017 12:04)  - Today, patient is without complaints.         *****MEDICATIONS:    MEDICATIONS  (STANDING):  pantoprazole  Injectable 40 milliGRAM(s) IV Push two times a day  lisinopril 10 milliGRAM(s) Oral daily  tamsulosin 0.4 milliGRAM(s) Oral at bedtime  atorvastatin 10 milliGRAM(s) Oral at bedtime  amLODIPine   Tablet 10 milliGRAM(s) Oral daily  sodium chloride 0.9%. 1000 milliLiter(s) (40 mL/Hr) IV Continuous <Continuous>  insulin lispro (HumaLOG) corrective regimen sliding scale   SubCutaneous three times a day before meals  insulin lispro (HumaLOG) corrective regimen sliding scale   SubCutaneous at bedtime  dextrose 5%. 1000 milliLiter(s) (50 mL/Hr) IV Continuous <Continuous>  dextrose 50% Injectable 12.5 Gram(s) IV Push once  dextrose 50% Injectable 25 Gram(s) IV Push once  dextrose 50% Injectable 25 Gram(s) IV Push once    MEDICATIONS  (PRN):  dextrose Gel 1 Dose(s) Oral once PRN Blood Glucose LESS THAN 70 milliGRAM(s)/deciliter  glucagon  Injectable 1 milliGRAM(s) IntraMuscular once PRN Glucose LESS THAN 70 milligrams/deciliter  acetaminophen   Tablet. 650 milliGRAM(s) Oral every 6 hours PRN Moderate Pain (4 - 6)             ***** REVIEW OF SYSTEM:  GEN: no fever, no chills, no pain  RESP: no SOB, no cough, no sputum  CVS: no chest pain, no palpitations, no edema  GI: no abdominal pain, no nausea, no vomiting, no constipation, no diarrhea  : no dysurea, no frequency  NEURO: no headache, no diziness  PSYCH: no depression, not anxious  Derm : no itching, no rash         ***** VITAL SIGNS:    T(F): 98.4 (17 @ 04:26), Max: 98.4 (17 @ 04:26)  HR: 70 (17 @ 04:26) (70 - 80)  BP: 137/57 (17 @ 04:26) (120/55 - 137/57)  RR: 18 (17 @ 04:26) (16 - 18)  SpO2: 98% (17 @ 04:26) (98% - 98%)  Wt(kg): --  ,   I&O's Summary    2017 07:01  -  2017 07:00  --------------------------------------------------------  IN: 1320 mL / OUT: 450 mL / NET: 870 mL           *****PHYSICAL EXAM:  GEN: A&O X 3 , NAD , comfortable  HEENT: NCAT, EOMI, MMM, no icterus  NECK: Supple, No JVD  CVS: S1S2 , regular , No M/R/G appreciated  PULM: CTA B/L,  no W/R/R appreciated  ABD.: soft. non tender, non distended,  bowel sounds present  Extrem: intact pulses , no edema noted  Derm: No rash or ecchymosis noted  PSYCH: normal mood, no depression, not anxious         *****LAB AND IMAGIN.1    10.3  )-----------( 143      ( 2017 04:06 )             23.6                   142  |  110<H>  |  22  ----------------------------<  147<H>  4.6   |  19<L>  |  1.49<H>    Ca    8.5      2017 07:35                [All pertinent recent Imaging/Reports reviewed]         *****A S S E S S M E N T   A N D   P L A N :  89M GIB, TAVR 2015, CAD  anti-plt held  f/u GI  EGD noted  capsule study underway  transfuse to keep Hgb >8  f/u H/H      __________________________  CODY Nunez D.O.
- Patient seen and examined.  - In summary, patient is a 89y year old man who presented with melena (2017 12:04)  - Today, patient is without complaints.         *****MEDICATIONS:    MEDICATIONS  (STANDING):  pantoprazole  Injectable 40 milliGRAM(s) IV Push two times a day  lisinopril 10 milliGRAM(s) Oral daily  tamsulosin 0.4 milliGRAM(s) Oral at bedtime  atorvastatin 10 milliGRAM(s) Oral at bedtime  amLODIPine   Tablet 10 milliGRAM(s) Oral daily  sodium chloride 0.9%. 1000 milliLiter(s) (40 mL/Hr) IV Continuous <Continuous>  insulin lispro (HumaLOG) corrective regimen sliding scale   SubCutaneous three times a day before meals  insulin lispro (HumaLOG) corrective regimen sliding scale   SubCutaneous at bedtime  dextrose 5%. 1000 milliLiter(s) (50 mL/Hr) IV Continuous <Continuous>  dextrose 50% Injectable 12.5 Gram(s) IV Push once  dextrose 50% Injectable 25 Gram(s) IV Push once  dextrose 50% Injectable 25 Gram(s) IV Push once    MEDICATIONS  (PRN):  dextrose Gel 1 Dose(s) Oral once PRN Blood Glucose LESS THAN 70 milliGRAM(s)/deciliter  glucagon  Injectable 1 milliGRAM(s) IntraMuscular once PRN Glucose LESS THAN 70 milligrams/deciliter  acetaminophen   Tablet. 650 milliGRAM(s) Oral every 6 hours PRN Moderate Pain (4 - 6)           ***** REVIEW OF SYSTEM:  GEN: no fever, no chills, no pain  RESP: no SOB, no cough, no sputum  CVS: no chest pain, no palpitations, no edema  GI: no abdominal pain, no nausea, no vomiting, no constipation, no diarrhea  : no dysurea, no frequency  NEURO: no headache, no diziness  PSYCH: no depression, not anxious  Derm : no itching, no rash         ***** VITAL SIGNS:    T(F): 98.3 (17 @ 04:21), Max: 98.3 (17 @ 04:21)  HR: 80 (17 @ 04:21) (76 - 80)  BP: 128/56 (17 @ 04:21) (113/57 - 128/56)  RR: 18 (17 @ 04:21) (17 - 18)  SpO2: 98% (17 @ 04:21) (97% - 99%)  Wt(kg): --  ,   I&O's Summary    2017 07:01  -  2017 07:00  --------------------------------------------------------  IN: 1190 mL / OUT: 1100 mL / NET: 90 mL           *****PHYSICAL EXAM:  GEN: A&O X 3 , NAD , comfortable  HEENT: NCAT, EOMI, MMM, no icterus  NECK: Supple, No JVD  CVS: S1S2 , regular , No M/R/G appreciated  PULM: CTA B/L,  no W/R/R appreciated  ABD.: soft. non tender, non distended,  bowel sounds present  Extrem: intact pulses , no edema noted  Derm: No rash or ecchymosis noted  PSYCH: normal mood, no depression, not anxious         *****LAB AND IMAGIN.0    8.11  )-----------( 170      ( 2017 07:02 )             24.6               06-26    137  |  107  |  22  ----------------------------<  145<H>  4.3   |  18<L>  |  1.33<H>    Ca    8.8      2017 07:01      PT/INR - ( 2017 07:02 )   PT: 12.7 sec;   INR: 1.12 ratio         PTT - ( 2017 07:02 )  PTT:26.2 sec                     [All pertinent recent Imaging/Reports reviewed]         *****A S S E S S M E N T   A N D   P L A N :  89M GIB TAVR 2015, CAD  anti-plt held  f/u GI  EGD noted  transfuse to keep Hgb >8  f/u H/H        __________________________  CODY Nunez D.O.
- Patient seen and examined.  - In summary, patient is a 89y year old man who presented with melena (2017 12:04)  - Today, patient still with melena.         *****MEDICATIONS:    MEDICATIONS  (STANDING):  pantoprazole  Injectable 40 milliGRAM(s) IV Push two times a day  lisinopril 10 milliGRAM(s) Oral daily  tamsulosin 0.4 milliGRAM(s) Oral at bedtime  atorvastatin 10 milliGRAM(s) Oral at bedtime  amLODIPine   Tablet 10 milliGRAM(s) Oral daily  insulin lispro (HumaLOG) corrective regimen sliding scale   SubCutaneous three times a day before meals  insulin lispro (HumaLOG) corrective regimen sliding scale   SubCutaneous at bedtime  dextrose 5%. 1000 milliLiter(s) (50 mL/Hr) IV Continuous <Continuous>  dextrose 50% Injectable 12.5 Gram(s) IV Push once  dextrose 50% Injectable 25 Gram(s) IV Push once  dextrose 50% Injectable 25 Gram(s) IV Push once  sodium chloride 0.9%. 1000 milliLiter(s) (40 mL/Hr) IV Continuous <Continuous>    MEDICATIONS  (PRN):  dextrose Gel 1 Dose(s) Oral once PRN Blood Glucose LESS THAN 70 milliGRAM(s)/deciliter  glucagon  Injectable 1 milliGRAM(s) IntraMuscular once PRN Glucose LESS THAN 70 milligrams/deciliter  acetaminophen   Tablet. 650 milliGRAM(s) Oral every 6 hours PRN Moderate Pain (4 - 6)           ***** REVIEW OF SYSTEM:  GEN: no fever, no chills, no pain  RESP: no SOB, no cough, no sputum  CVS: no chest pain, no palpitations, no edema  GI: no abdominal pain, no nausea, no vomiting, no constipation, no diarrhea  : no dysurea, no frequency  NEURO: no headache, no diziness  PSYCH: no depression, not anxious  Derm : no itching, no rash         ***** VITAL SIGNS:    T(F): 98.3 (17 @ 04:11), Max: 98.3 (17 @ 11:31)  HR: 79 (17 @ 04:11) (79 - 89)  BP: 118/74 (17 @ 04:11) (108/72 - 126/84)  RR: 17 (17 @ 04:11) (16 - 17)  SpO2: 98% (17 @ 04:11) (98% - 99%)  Wt(kg): --  ,   I&O's Summary    2017 07:01  -  2017 07:00  --------------------------------------------------------  IN: 3985 mL / OUT: 3300 mL / NET: 685 mL             *****PHYSICAL EXAM:  GEN: A&O X 3 , NAD , comfortable  HEENT: NCAT, EOMI, MMM, no icterus  NECK: Supple, No JVD  CVS: S1S2 , regular , No M/R/G appreciated  PULM: CTA B/L,  no W/R/R appreciated  ABD.: soft. non tender, non distended,  bowel sounds present  Extrem: intact pulses , no edema noted  Derm: No rash or ecchymosis noted  PSYCH: normal mood, no depression, not anxious         *****LAB AND IMAGIN.2   8.9   )-----------( 168      ( 2017 06:15 )             32.9               -    141  |  106  |  12  ----------------------------<  141<H>  3.9   |  19<L>  |  1.30    Ca    9.5      2017 07:19        [All pertinent recent Imaging/Reports reviewed]         *****A S S E S S M E N T   A N D   P L A N :  89M GIB, TAVR 2015, CAD  anti-plt on hold  GI f/u  colonoscopy today  capsule study with bleeding in colon  transfuse to keep Hgb >8  VSS    __________________________  CODY Nunez D.O.
- Patient seen and examined.  - In summary, patient is a 89y year old man who presented with melena (2017 12:04)  - Today, patient still with melena.         *****MEDICATIONS:    MEDICATIONS  (STANDING):  pantoprazole  Injectable 40 milliGRAM(s) IV Push two times a day  lisinopril 10 milliGRAM(s) Oral daily  tamsulosin 0.4 milliGRAM(s) Oral at bedtime  atorvastatin 10 milliGRAM(s) Oral at bedtime  amLODIPine   Tablet 10 milliGRAM(s) Oral daily  insulin lispro (HumaLOG) corrective regimen sliding scale   SubCutaneous three times a day before meals  insulin lispro (HumaLOG) corrective regimen sliding scale   SubCutaneous at bedtime  dextrose 5%. 1000 milliLiter(s) (50 mL/Hr) IV Continuous <Continuous>  dextrose 50% Injectable 12.5 Gram(s) IV Push once  dextrose 50% Injectable 25 Gram(s) IV Push once  dextrose 50% Injectable 25 Gram(s) IV Push once  sodium chloride 0.9%. 1000 milliLiter(s) (40 mL/Hr) IV Continuous <Continuous>    MEDICATIONS  (PRN):  dextrose Gel 1 Dose(s) Oral once PRN Blood Glucose LESS THAN 70 milliGRAM(s)/deciliter  glucagon  Injectable 1 milliGRAM(s) IntraMuscular once PRN Glucose LESS THAN 70 milligrams/deciliter  acetaminophen   Tablet. 650 milliGRAM(s) Oral every 6 hours PRN Moderate Pain (4 - 6)           ***** REVIEW OF SYSTEM:  GEN: no fever, no chills, no pain  RESP: no SOB, no cough, no sputum  CVS: no chest pain, no palpitations, no edema  GI: no abdominal pain, no nausea, no vomiting, no constipation, no diarrhea  : no dysurea, no frequency  NEURO: no headache, no diziness  PSYCH: no depression, not anxious  Derm : no itching, no rash         ***** VITAL SIGNS:    T(F): 98.5 (17 @ 04:05), Max: 98.5 (17 @ 04:05)  HR: 76 (17 @ 04:05) (70 - 81)  BP: 142/58 (17 @ 04:05) (135/66 - 142/58)  RR: 18 (17 @ 04:05) (17 - 18)  SpO2: 99% (17 @ 04:05) (98% - 99%)  Wt(kg): --  ,   I&O's Summary    2017 07:01  -  2017 07:00  --------------------------------------------------------  IN: 1320 mL / OUT: 980 mL / NET: 340 mL    2017 07:01  -  2017 09:23  --------------------------------------------------------  IN: 0 mL / OUT: 600 mL / NET: -600 mL               *****PHYSICAL EXAM:  GEN: A&O X 3 , NAD , comfortable  HEENT: NCAT, EOMI, MMM, no icterus  NECK: Supple, No JVD  CVS: S1S2 , regular , No M/R/G appreciated  PULM: CTA B/L,  no W/R/R appreciated  ABD.: soft. non tender, non distended,  bowel sounds present  Extrem: intact pulses , no edema noted  Derm: No rash or ecchymosis noted  PSYCH: normal mood, no depression, not anxious         *****LAB AND IMAGIN.2   8.9   )-----------( 168      ( 2017 06:15 )             32.9               -    141  |  106  |  12  ----------------------------<  141<H>  3.9   |  19<L>  |  1.30    Ca    9.5      2017 07:19      [All pertinent recent Imaging/Reports reviewed]         *****A S S E S S M E N T   A N D   P L A N :  89M GIB, TAVR , CAD  anti-plt on hold  GI f/u  colonoscopy noted  capsule study with bleeding in colon  transfuse to keep Hgb >8  VSS  DCP    __________________________  CODY Nunez D.O.
- Patient seen and examined.  - In summary, patient is a 89y year old man who presented with melena (2017 12:04)  - Today, patient still with melena.         *****MEDICATIONS:    MEDICATIONS  (STANDING):  pantoprazole  Injectable 40 milliGRAM(s) IV Push two times a day  lisinopril 10 milliGRAM(s) Oral daily  tamsulosin 0.4 milliGRAM(s) Oral at bedtime  atorvastatin 10 milliGRAM(s) Oral at bedtime  amLODIPine   Tablet 10 milliGRAM(s) Oral daily  insulin lispro (HumaLOG) corrective regimen sliding scale   SubCutaneous three times a day before meals  insulin lispro (HumaLOG) corrective regimen sliding scale   SubCutaneous at bedtime  dextrose 5%. 1000 milliLiter(s) (50 mL/Hr) IV Continuous <Continuous>  dextrose 50% Injectable 12.5 Gram(s) IV Push once  dextrose 50% Injectable 25 Gram(s) IV Push once  dextrose 50% Injectable 25 Gram(s) IV Push once  sodium chloride 0.9%. 1000 milliLiter(s) (40 mL/Hr) IV Continuous <Continuous>    MEDICATIONS  (PRN):  dextrose Gel 1 Dose(s) Oral once PRN Blood Glucose LESS THAN 70 milliGRAM(s)/deciliter  glucagon  Injectable 1 milliGRAM(s) IntraMuscular once PRN Glucose LESS THAN 70 milligrams/deciliter  acetaminophen   Tablet. 650 milliGRAM(s) Oral every 6 hours PRN Moderate Pain (4 - 6)           ***** REVIEW OF SYSTEM:  GEN: no fever, no chills, no pain  RESP: no SOB, no cough, no sputum  CVS: no chest pain, no palpitations, no edema  GI: no abdominal pain, no nausea, no vomiting, no constipation, no diarrhea  : no dysurea, no frequency  NEURO: no headache, no diziness  PSYCH: no depression, not anxious  Derm : no itching, no rash         ***** VITAL SIGNS:    T(F): 98.9 (17 @ 03:58), Max: 99.1 (17 @ 20:44)  HR: 76 (17 @ 03:58) (74 - 79)  BP: 118/63 (17 @ 03:58) (111/54 - 129/66)  RR: 17 (17 @ 03:58) (17 - )  SpO2: 98% (17 @ 03:58) (97% - 99%)  Wt(kg): --  ,   I&O's Summary    2017 07:01  -  2017 07:00  --------------------------------------------------------  IN: 2150 mL / OUT: 1300 mL / NET: 850 mL           *****PHYSICAL EXAM:  GEN: A&O X 3 , NAD , comfortable  HEENT: NCAT, EOMI, MMM, no icterus  NECK: Supple, No JVD  CVS: S1S2 , regular , No M/R/G appreciated  PULM: CTA B/L,  no W/R/R appreciated  ABD.: soft. non tender, non distended,  bowel sounds present  Extrem: intact pulses , no edema noted  Derm: No rash or ecchymosis noted  PSYCH: normal mood, no depression, not anxious         *****LAB AND IMAGIN.8    8.98  )-----------( 185      ( 2017 08:42 )             24.4               07-    141  |  107  |  13  ----------------------------<  156<H>  4.2   |  18<L>  |  1.32<H>    Ca    8.8      2017 08:42        [All pertinent recent Imaging/Reports reviewed]         *****A S S E S S M E N T   A N D   P L A N :  89M GIB, TAVR , CAD  anti-plt on hold  GI f/u  colonoscopy Monday  capsule study with bleeding  transfuse to keep Hgb >8      __________________________  CODY Nunez D.O.
- Patient seen and examined.  - In summary, patient is a 89y year old man who presented with melena (2017 12:04)  - Today, patient still with melena.         *****MEDICATIONS:    MEDICATIONS  (STANDING):  pantoprazole  Injectable 40 milliGRAM(s) IV Push two times a day  lisinopril 10 milliGRAM(s) Oral daily  tamsulosin 0.4 milliGRAM(s) Oral at bedtime  atorvastatin 10 milliGRAM(s) Oral at bedtime  amLODIPine   Tablet 10 milliGRAM(s) Oral daily  sodium chloride 0.9%. 1000 milliLiter(s) (40 mL/Hr) IV Continuous <Continuous>  insulin lispro (HumaLOG) corrective regimen sliding scale   SubCutaneous three times a day before meals  insulin lispro (HumaLOG) corrective regimen sliding scale   SubCutaneous at bedtime  dextrose 5%. 1000 milliLiter(s) (50 mL/Hr) IV Continuous <Continuous>  dextrose 50% Injectable 12.5 Gram(s) IV Push once  dextrose 50% Injectable 25 Gram(s) IV Push once  dextrose 50% Injectable 25 Gram(s) IV Push once    MEDICATIONS  (PRN):  dextrose Gel 1 Dose(s) Oral once PRN Blood Glucose LESS THAN 70 milliGRAM(s)/deciliter  glucagon  Injectable 1 milliGRAM(s) IntraMuscular once PRN Glucose LESS THAN 70 milligrams/deciliter  acetaminophen   Tablet. 650 milliGRAM(s) Oral every 6 hours PRN Moderate Pain (4 - 6)             ***** REVIEW OF SYSTEM:  GEN: no fever, no chills, no pain  RESP: no SOB, no cough, no sputum  CVS: no chest pain, no palpitations, no edema  GI: no abdominal pain, no nausea, no vomiting, no constipation, no diarrhea  : no dysurea, no frequency  NEURO: no headache, no diziness  PSYCH: no depression, not anxious  Derm : no itching, no rash         ***** VITAL SIGNS:    T(F): 98 (17 @ 06:10), Max: 98.2 (17 @ 18:15)  HR: 76 (17 @ 06:10) (76 - 86)  BP: 141/62 (17 @ 06:10) (132/63 - 143/60)  RR: 18 (17 @ 06:10) (18 - 18)  SpO2: 98% (17 @ 06:10) (98% - 100%)  Wt(kg): --  ,   I&O's Summary    2017 07:01  -  2017 07:00  --------------------------------------------------------  IN: 1200 mL / OUT: 0 mL / NET: 1200 mL               *****PHYSICAL EXAM:  GEN: A&O X 3 , NAD , comfortable  HEENT: NCAT, EOMI, MMM, no icterus  NECK: Supple, No JVD  CVS: S1S2 , regular , No M/R/G appreciated  PULM: CTA B/L,  no W/R/R appreciated  ABD.: soft. non tender, non distended,  bowel sounds present  Extrem: intact pulses , no edema noted  Derm: No rash or ecchymosis noted  PSYCH: normal mood, no depression, not anxious         *****LAB AND IMAGIN.0    8.62  )-----------( 172      ( 2017 07:15 )             24.3               -    137  |  105  |  19  ----------------------------<  160<H>  4.2   |  20<L>  |  1.08    Ca    8.2<L>      2017 07:19        [All pertinent recent Imaging/Reports reviewed]         *****A S S E S S M E N T   A N D   P L A N :  89M GIB, TAVR 2015, CAD  anti-plt held  f/u GI  EGD  capsule study with active bleed  colonoscopy per GI  transfuse to keep Hgb >8  f/u H/H closely      __________________________  CODY Nunez D.O.
- Patient seen and examined.  - In summary, patient is a 89y year old man who presented with melena (2017 12:04)  - Today, patient still with melena.         *****MEDICATIONS:    MEDICATIONS  (STANDING):  pantoprazole  Injectable 40 milliGRAM(s) IV Push two times a day  lisinopril 10 milliGRAM(s) Oral daily  tamsulosin 0.4 milliGRAM(s) Oral at bedtime  atorvastatin 10 milliGRAM(s) Oral at bedtime  amLODIPine   Tablet 10 milliGRAM(s) Oral daily  sodium chloride 0.9%. 1000 milliLiter(s) (40 mL/Hr) IV Continuous <Continuous>  insulin lispro (HumaLOG) corrective regimen sliding scale   SubCutaneous three times a day before meals  insulin lispro (HumaLOG) corrective regimen sliding scale   SubCutaneous at bedtime  dextrose 5%. 1000 milliLiter(s) (50 mL/Hr) IV Continuous <Continuous>  dextrose 50% Injectable 12.5 Gram(s) IV Push once  dextrose 50% Injectable 25 Gram(s) IV Push once  dextrose 50% Injectable 25 Gram(s) IV Push once    MEDICATIONS  (PRN):  dextrose Gel 1 Dose(s) Oral once PRN Blood Glucose LESS THAN 70 milliGRAM(s)/deciliter  glucagon  Injectable 1 milliGRAM(s) IntraMuscular once PRN Glucose LESS THAN 70 milligrams/deciliter  acetaminophen   Tablet. 650 milliGRAM(s) Oral every 6 hours PRN Moderate Pain (4 - 6)           ***** REVIEW OF SYSTEM:  GEN: no fever, no chills, no pain  RESP: no SOB, no cough, no sputum  CVS: no chest pain, no palpitations, no edema  GI: no abdominal pain, no nausea, no vomiting, no constipation, no diarrhea  : no dysurea, no frequency  NEURO: no headache, no diziness  PSYCH: no depression, not anxious  Derm : no itching, no rash         ***** VITAL SIGNS:    T(F): 97.8 (17 @ 04:18), Max: 97.9 (17 @ 20:57)  HR: 72 (17 @ 04:18) (72 - 78)  BP: 136/58 (17 @ 04:18) (135/64 - 138/55)  RR: 18 (17 @ 04:18) (17 - 18)  SpO2: 98% (17 @ 04:18) (98% - 100%)  Wt(kg): --  ,   I&O's Summary    2017 07:01  -  2017 07:00  --------------------------------------------------------  IN: 1920 mL / OUT: 2100 mL / NET: -180 mL           *****PHYSICAL EXAM:  GEN: A&O X 3 , NAD , comfortable  HEENT: NCAT, EOMI, MMM, no icterus  NECK: Supple, No JVD  CVS: S1S2 , regular , No M/R/G appreciated  PULM: CTA B/L,  no W/R/R appreciated  ABD.: soft. non tender, non distended,  bowel sounds present  Extrem: intact pulses , no edema noted  Derm: No rash or ecchymosis noted  PSYCH: normal mood, no depression, not anxious         *****LAB AND IMAGIN.1    8.87  )-----------( 174      ( 2017 07:19 )             21.6                   137  |  105  |  19  ----------------------------<  160<H>  4.2   |  20<L>  |  1.08    Ca    8.2<L>      2017 07:19      [All pertinent recent Imaging/Reports reviewed]         *****A S S E S S M E N T   A N D   P L A N :  89M GIB, TAVR 2015, CAD  anti-plt held  f/u GI  EGD noted  capsule study result pending  transfuse to keep Hgb >8  f/u H/H closely      __________________________  CODY Nunez D.O.
- Patient seen and examined.  - In summary, patient is a 89y year old man who presented with melena (2017 12:04)  - Today, patient still with melena.         *****MEDICATIONS:    MEDICATIONS  (STANDING):  pantoprazole  Injectable 40 milliGRAM(s) IV Push two times a day  lisinopril 10 milliGRAM(s) Oral daily  tamsulosin 0.4 milliGRAM(s) Oral at bedtime  atorvastatin 10 milliGRAM(s) Oral at bedtime  amLODIPine   Tablet 10 milliGRAM(s) Oral daily  sodium chloride 0.9%. 1000 milliLiter(s) (40 mL/Hr) IV Continuous <Continuous>  insulin lispro (HumaLOG) corrective regimen sliding scale   SubCutaneous three times a day before meals  insulin lispro (HumaLOG) corrective regimen sliding scale   SubCutaneous at bedtime  dextrose 5%. 1000 milliLiter(s) (50 mL/Hr) IV Continuous <Continuous>  dextrose 50% Injectable 12.5 Gram(s) IV Push once  dextrose 50% Injectable 25 Gram(s) IV Push once  dextrose 50% Injectable 25 Gram(s) IV Push once    MEDICATIONS  (PRN):  dextrose Gel 1 Dose(s) Oral once PRN Blood Glucose LESS THAN 70 milliGRAM(s)/deciliter  glucagon  Injectable 1 milliGRAM(s) IntraMuscular once PRN Glucose LESS THAN 70 milligrams/deciliter  acetaminophen   Tablet. 650 milliGRAM(s) Oral every 6 hours PRN Moderate Pain (4 - 6)           ***** REVIEW OF SYSTEM:  GEN: no fever, no chills, no pain  RESP: no SOB, no cough, no sputum  CVS: no chest pain, no palpitations, no edema  GI: no abdominal pain, no nausea, no vomiting, no constipation, no diarrhea  : no dysurea, no frequency  NEURO: no headache, no diziness  PSYCH: no depression, not anxious  Derm : no itching, no rash         ***** VITAL SIGNS:    T(F): 98.2 (17 @ 03:58), Max: 98.7 (17 @ 11:46)  HR: 79 (17 @ 03:58) (79 - 80)  BP: 121/64 (17 @ 03:58) (109/58 - 127/62)  RR: 18 (17 @ 03:58) (18 - 18)  SpO2: 97% (17 @ 03:58) (97% - 98%)  Wt(kg): --  ,   I&O's Summary    2017 07:01  -  2017 07:00  --------------------------------------------------------  IN: 960 mL / OUT: 1400 mL / NET: -440 mL             *****PHYSICAL EXAM:  GEN: A&O X 3 , NAD , comfortable  HEENT: NCAT, EOMI, MMM, no icterus  NECK: Supple, No JVD  CVS: S1S2 , regular , No M/R/G appreciated  PULM: CTA B/L,  no W/R/R appreciated  ABD.: soft. non tender, non distended,  bowel sounds present  Extrem: intact pulses , no edema noted  Derm: No rash or ecchymosis noted  PSYCH: normal mood, no depression, not anxious         *****LAB AND IMAGIN.0    8.62  )-----------( 172      ( 2017 07:15 )             24.3               06-30    142  |  108  |  15  ----------------------------<  165<H>  4.0   |  18<L>  |  1.33<H>    Ca    8.6      2017 07:15      [All pertinent recent Imaging/Reports reviewed]         *****A S S E S S M E N T   A N D   P L A N :  89M GIB, TAVR , CAD  anti-plt on hold  GI f/u  EGD/colonoscopy Monday  capsule study with active bleed  transfuse to keep Hgb >8      __________________________  CODY Nunez D.O.
CARDIOLOGY     PROGRESS  NOTE   ________________________________________________    CHIEF COMPLAINT:Patient is a 89y old  Male who presents with a chief complaint of melena (24 Jun 2017 12:04)  no chest pain.  	  REVIEW OF SYSTEMS:  CONSTITUTIONAL: No fever, weight loss, or fatigue  EYES: No eye pain, visual disturbances, or discharge  ENT:  No difficulty hearing, tinnitus, vertigo; No sinus or throat pain  NECK: No pain or stiffness  RESPIRATORY: No cough, wheezing, chills or hemoptysis; No Shortness of Breath  CARDIOVASCULAR: No chest pain, palpitations, passing out, dizziness, or leg swelling  GASTROINTESTINAL: No abdominal or epigastric pain. No nausea, vomiting, or hematemesis; No diarrhea or constipation.   GENITOURINARY: No dysuria, frequency, hematuria, or incontinence  NEUROLOGICAL: No headaches, memory loss, loss of strength, numbness, or tremors  SKIN: No itching, burning, rashes, or lesions   LYMPH Nodes: No enlarged glands  ENDOCRINE: No heat or cold intolerance; No hair loss  MUSCULOSKELETAL: No joint pain or swelling; No muscle, back, or extremity pain  PSYCHIATRIC: No depression, anxiety, mood swings, or difficulty sleeping  HEME/LYMPH: No easy bruising, or bleeding gums  ALLERGY AND IMMUNOLOGIC: No hives or eczema	    [ ] All others negative	  [ ] Unable to obtain    PHYSICAL EXAM:  T(C): 36.6, Max: 36.9 (06-24 @ 15:27)  HR: 72 (57 - 73)  BP: 121/58 (108/58 - 141/60)  RR: 18 (16 - 18)  SpO2: 99% (99% - 100%)  Wt(kg): --  I&O's Summary      Appearance: Normal	  HEENT:   Normal oral mucosa, PERRL, EOMI	  Lymphatic: No lymphadenopathy  Cardiovascular: Normal S1 S2, No JVD, + systolic murmurs, No edema  Respiratory: Lungs clear to auscultation	  Psychiatry: A & O x 3, Mood & affect appropriate  Gastrointestinal:  Soft, Non-tender, + BS	  Skin: No rashes, No ecchymoses, No cyanosis	  Neurologic: Non-focal  Extremities: Normal range of motion, No clubbing, cyanosis or edema  Vascular: Peripheral pulses palpable 2+ bilaterally    MEDICATIONS  (STANDING):  pantoprazole  Injectable 40milliGRAM(s) IV Push two times a day  lisinopril 10milliGRAM(s) Oral daily  tamsulosin 0.4milliGRAM(s) Oral at bedtime  atorvastatin 10milliGRAM(s) Oral at bedtime  amLODIPine   Tablet 10milliGRAM(s) Oral daily  sodium chloride 0.9%. 1000milliLiter(s) IV Continuous <Continuous>  insulin lispro (HumaLOG) corrective regimen sliding scale  SubCutaneous three times a day before meals  insulin lispro (HumaLOG) corrective regimen sliding scale  SubCutaneous at bedtime  dextrose 5%. 1000milliLiter(s) IV Continuous <Continuous>  dextrose 50% Injectable 12.5Gram(s) IV Push once  dextrose 50% Injectable 25Gram(s) IV Push once  dextrose 50% Injectable 25Gram(s) IV Push once      TELEMETRY:nsr 	    ECG:  	  RADIOLOGY:  OTHER: 	Diagnosis Line   NORMAL SINUS RHYTHM  LEFT VENTRICULAR HYPERTROPHY WITH QRS WIDENING AND REPOLARIZATION ABNORMALITY  CANNOT RULE OUT SEPTAL INFARCT , AGE UNDETERMINED  	  LABS:	 	    CARDIAC MARKERS:  CARDIAC MARKERS ( 24 Jun 2017 10:58 )  x     / <0.01 ng/mL / 125 U/L / x     / 2.3 ng/mL                                7.6    7.69  )-----------( 186      ( 25 Jun 2017 06:00 )             24.2     06-25    140  |  110<H>  |  28<H>  ----------------------------<  104<H>  4.8   |  19<L>  |  1.46<H>    Ca    9.1      25 Jun 2017 06:00  Phos  3.1     06-24  Mg     1.9     06-24    TPro  6.9  /  Alb  3.5  /  TBili  0.3  /  DBili  x   /  AST  28  /  ALT  17  /  AlkPhos  58  06-24    proBNP:   Lipid Profile:   HgA1c:   TSH:         Assessment and plan  ---------------------------  transfuse as needed   continue cardiac meds  echo  gi/egd  fu h/h
INTERVAL HPI/OVERNIGHT EVENTS:  HPI:  : 89 year old male with PMHx of aortic stenosis, DM, TAVR,  HTN, HLD, BPH, CAD on baby aspirin and Plavix, presenting with melena since yesterday, 7 episodes total, accompanied by weakness, lightheadedness, and mild dyspnea on exertion. He had a colonoscopy 2 years ago after having BRBPR and was found to have a polyp. Denies abdominal pain, nausea, vomiting, or fever. No other NSAID use or prior PUD. , PMD BASHIR Piper that treated LGIB 2 years ago was Emily  No new overnight event.  No N/V/D.  Tolerating diet.no brbpr some dark stool    MEDICATIONS  (STANDING):  pantoprazole  Injectable 40 milliGRAM(s) IV Push two times a day  lisinopril 10 milliGRAM(s) Oral daily  tamsulosin 0.4 milliGRAM(s) Oral at bedtime  atorvastatin 10 milliGRAM(s) Oral at bedtime  amLODIPine   Tablet 10 milliGRAM(s) Oral daily  insulin lispro (HumaLOG) corrective regimen sliding scale   SubCutaneous three times a day before meals  insulin lispro (HumaLOG) corrective regimen sliding scale   SubCutaneous at bedtime  dextrose 5%. 1000 milliLiter(s) (50 mL/Hr) IV Continuous <Continuous>  dextrose 50% Injectable 12.5 Gram(s) IV Push once  dextrose 50% Injectable 25 Gram(s) IV Push once  dextrose 50% Injectable 25 Gram(s) IV Push once  sodium chloride 0.9%. 1000 milliLiter(s) (40 mL/Hr) IV Continuous <Continuous>  polyethylene glycol/electrolyte Solution 1 Liter(s) Oral every 4 hours  bisacodyl 10 milliGRAM(s) Oral every 4 hours    MEDICATIONS  (PRN):  dextrose Gel 1 Dose(s) Oral once PRN Blood Glucose LESS THAN 70 milliGRAM(s)/deciliter  glucagon  Injectable 1 milliGRAM(s) IntraMuscular once PRN Glucose LESS THAN 70 milligrams/deciliter  acetaminophen   Tablet. 650 milliGRAM(s) Oral every 6 hours PRN Moderate Pain (4 - 6)      Allergies    Cipro (Pruritus; Rash)    Intolerances          General:  No wt loss, fevers, chills, night sweats, fatigue,   Eyes:  Good vision, no reported pain  ENT:  No sore throat, pain, runny nose, dysphagia  CV:  No pain, palpitations, hypo/hypertension  Resp:  No dyspnea, cough, tachypnea, wheezing  GI:  No pain, No nausea, No vomiting, No diarrhea, No constipation, No weight loss, No fever, No pruritis, No rectal bleeding, No tarry stools, No dysphagia,  :  No pain, bleeding, incontinence, nocturia  Muscle:  No pain, weakness  Neuro:  No weakness, tingling, memory problems  Psych:  No fatigue, insomnia, mood problems, depression  Endocrine:  No polyuria, polydipsia, cold/heat intolerance  Heme:  No petechiae, ecchymosis, easy bruisability  Skin:  No rash, tattoos, scars, edema      PHYSICAL EXAM:   Vital Signs:  Vital Signs Last 24 Hrs  T(C): 37.2 (2017 03:58), Max: 37.3 (2017 20:44)  T(F): 98.9 (2017 03:58), Max: 99.1 (2017 20:44)  HR: 76 (2017 03:58) (74 - 79)  BP: 118/63 (2017 03:58) (111/54 - 129/66)  BP(mean): --  RR: 17 (2017 03:58) (17 - 17)  SpO2: 98% (2017 03:58) (97% - 99%)  Daily     Daily Weight in k.4 (2017 07:31)I&O's Summary    2017 07:01  -  2017 07:00  --------------------------------------------------------  IN: 2150 mL / OUT: 1300 mL / NET: 850 mL        GENERAL:  Appears stated age, well-groomed, well-nourished, no distress  HEENT:  NC/AT,  conjunctivae clear and pink, no thyromegaly, nodules, adenopathy, no JVD, sclera -anicteric  CHEST:  Full & symmetric excursion, no increased effort, breath sounds clear  HEART:  Regular rhythm, S1, S2, no murmur/rub/S3/S4, no abdominal bruit, no edema  ABDOMEN:  Soft, non-tender, non-distended, normoactive bowel sounds,  no masses ,no hepato-splenomegaly, no signs of chronic liver disease  EXTEREMITIES:  no cyanosis,clubbing or edema  SKIN:  No rash/erythema/ecchymoses/petechiae/wounds/abscess/warm/dry  NEURO:  Alert, oriented, no asterixis, no tremor, no encephalopathy      LABS:                        7.8    8.98  )-----------( 185      ( 2017 08:42 )             24.4     07-    141  |  107  |  13  ----------------------------<  156<H>  4.2   |  18<L>  |  1.32<H>    Ca    8.8      2017 08:42          amylase   lipase  RADIOLOGY & ADDITIONAL TESTS:
INTERVAL HPI/OVERNIGHT EVENTS:  HPI:  : 89 year old male with PMHx of aortic stenosis, DM, TAVR,  HTN, HLD, BPH, CAD on baby aspirin and Plavix, presenting with melena since yesterday, 7 episodes total, accompanied by weakness, lightheadedness, and mild dyspnea on exertion. He had a colonoscopy 2 years ago after having BRBPR and was found to have a polyp. Denies abdominal pain, nausea, vomiting, or fever. No other NSAID use or prior PUD. , PMD Frank Carranza, GI that treated LGIB 2 years ago was Emily   No new overnight event.  No N/V/D.  Tolerating diet. no bleeding dark hard bm      MEDICATIONS  (STANDING):  pantoprazole  Injectable 40 milliGRAM(s) IV Push two times a day  lisinopril 10 milliGRAM(s) Oral daily  tamsulosin 0.4 milliGRAM(s) Oral at bedtime  atorvastatin 10 milliGRAM(s) Oral at bedtime  amLODIPine   Tablet 10 milliGRAM(s) Oral daily  sodium chloride 0.9%. 1000 milliLiter(s) (40 mL/Hr) IV Continuous <Continuous>  insulin lispro (HumaLOG) corrective regimen sliding scale   SubCutaneous three times a day before meals  insulin lispro (HumaLOG) corrective regimen sliding scale   SubCutaneous at bedtime  dextrose 5%. 1000 milliLiter(s) (50 mL/Hr) IV Continuous <Continuous>  dextrose 50% Injectable 12.5 Gram(s) IV Push once  dextrose 50% Injectable 25 Gram(s) IV Push once  dextrose 50% Injectable 25 Gram(s) IV Push once    MEDICATIONS  (PRN):  dextrose Gel 1 Dose(s) Oral once PRN Blood Glucose LESS THAN 70 milliGRAM(s)/deciliter  glucagon  Injectable 1 milliGRAM(s) IntraMuscular once PRN Glucose LESS THAN 70 milligrams/deciliter  acetaminophen   Tablet. 650 milliGRAM(s) Oral every 6 hours PRN Moderate Pain (4 - 6)      Allergies    Cipro (Pruritus; Rash)    Intolerances          General:  No wt loss, fevers, chills, night sweats, fatigue,   Eyes:  Good vision, no reported pain  ENT:  No sore throat, pain, runny nose, dysphagia  CV:  No pain, palpitations, hypo/hypertension  Resp:  No dyspnea, cough, tachypnea, wheezing  GI:  No pain, No nausea, No vomiting, No diarrhea, No constipation, No weight loss, No fever, No pruritis, No rectal bleeding, No tarry stools, No dysphagia,  :  No pain, bleeding, incontinence, nocturia  Muscle:  No pain, weakness  Neuro:  No weakness, tingling, memory problems  Psych:  No fatigue, insomnia, mood problems, depression  Endocrine:  No polyuria, polydipsia, cold/heat intolerance  Heme:  No petechiae, ecchymosis, easy bruisability  Skin:  No rash, tattoos, scars, edema      PHYSICAL EXAM:   Vital Signs:  Vital Signs Last 24 Hrs  T(C): 37 (2017 11:41), Max: 37 (2017 11:41)  T(F): 98.6 (:), Max: 98.6 (:41)  HR: 74 (2017 11:41) (74 - 80)  BP: 111/54 (2017 11:41) (111/54 - 127/62)  BP(mean): --  RR: 17 (2017 11:41) (17 - 18)  SpO2: 97% (2017 11:41) (97% - 98%)  Daily     Daily Weight in k.3 (2017 07:30)I&O's Summary    2017 07:  -  2017 07:00  --------------------------------------------------------  IN: 960 mL / OUT: 1400 mL / NET: -440 mL    2017 07:01  -  2017 12:07  --------------------------------------------------------  IN: 480 mL / OUT: 0 mL / NET: 480 mL        GENERAL:  Appears stated age, well-groomed, well-nourished, no distress  HEENT:  NC/AT,  conjunctivae clear and pink, no thyromegaly, nodules, adenopathy, no JVD, sclera -anicteric  CHEST:  Full & symmetric excursion, no increased effort, breath sounds clear  HEART:  Regular rhythm, S1, S2, no murmur/rub/S3/S4, no abdominal bruit, no edema  ABDOMEN:  Soft, non-tender, non-distended, normoactive bowel sounds,  no masses ,no hepato-splenomegaly, no signs of chronic liver disease  EXTEREMITIES:  no cyanosis,clubbing or edema  SKIN:  No rash/erythema/ecchymoses/petechiae/wounds/abscess/warm/dry  NEURO:  Alert, oriented, no asterixis, no tremor, no encephalopathy      LABS:                        7.8    8.98  )-----------( 185      ( 2017 08:42 )             24.4     -    141  |  107  |  13  ----------------------------<  156<H>  4.2   |  18<L>  |  1.32<H>    Ca    8.8      2017 08:42          amylase   lipase  RADIOLOGY & ADDITIONAL TESTS:
INTERVAL HPI/OVERNIGHT EVENTS:  Pt S/E -- without abdominal pain, nausea  Tolerating clear liquid diet well  BM x 1 last evening -- brown per patient     MEDICATIONS  (STANDING):  pantoprazole  Injectable 40milliGRAM(s) IV Push two times a day  lisinopril 10milliGRAM(s) Oral daily  tamsulosin 0.4milliGRAM(s) Oral at bedtime  atorvastatin 10milliGRAM(s) Oral at bedtime  amLODIPine   Tablet 10milliGRAM(s) Oral daily  sodium chloride 0.9%. 1000milliLiter(s) IV Continuous <Continuous>  insulin lispro (HumaLOG) corrective regimen sliding scale  SubCutaneous three times a day before meals  insulin lispro (HumaLOG) corrective regimen sliding scale  SubCutaneous at bedtime  dextrose 5%. 1000milliLiter(s) IV Continuous <Continuous>  dextrose 50% Injectable 12.5Gram(s) IV Push once  dextrose 50% Injectable 25Gram(s) IV Push once  dextrose 50% Injectable 25Gram(s) IV Push once    MEDICATIONS  (PRN):  dextrose Gel 1Dose(s) Oral once PRN Blood Glucose LESS THAN 70 milliGRAM(s)/deciliter  glucagon  Injectable 1milliGRAM(s) IntraMuscular once PRN Glucose LESS THAN 70 milligrams/deciliter      Allergies    Cipro (Pruritus; Rash)    Intolerances: ----    ROS:   General:  No wt loss, fevers, chills, night sweats  Eyes:  Good vision, no reported pain  ENT:  No sore throat, pain, runny nose, dysphagia  CV:  No pain, palpitations, hypo/hypertension  Resp:  No dyspnea, cough, tachypnea, wheezing  GI:  No pain, No nausea, No vomiting, No diarrhea, No constipation, No weight loss, No fever, No pruritis, No rectal bleeding, No dysphagia,  :  No pain, bleeding, incontinence, nocturia  Muscle:  No pain, + weakness  Neuro:  No weakness, tingling, memory problems  Psych:  No fatigue, insomnia, mood problems, depression  Endocrine:  No polyuria, polydipsia, cold/heat intolerance  Heme:  No petechiae, ecchymosis, easy bruisability  Skin:  No rash, tattoos, scars, edema      PHYSICAL EXAM:   Vital Signs:  Vital Signs Last 24 Hrs  T(C): 36.6, Max: 36.9 ( @ 15:27)  T(F): 97.9, Max: 98.4 ( @ 15:27)  HR: 72 (57 - 73)  BP: 121/58 (108/58 - 141/60)  BP(mean): --  RR: 18 (16 - 18)  SpO2: 99% (99% - 100%)  Daily     Daily Weight in k.4 (2017 07:15)    GENERAL:  Appears stated age, well-groomed, well-nourished, no distress  HEENT:  NC/AT,  conjunctivae clear and pink, no thyromegaly, nodules, adenopathy, no JVD, sclera -anicteric  CHEST:  Full & symmetric excursion, no increased effort, breath sounds clear  HEART:  Regular rhythm, S1, S2, no murmur/rub/S3/S4, no abdominal bruit, no edema  ABDOMEN:  Soft, non-tender, non-distended, normoactive bowel sounds,  no masses ,no hepato-splenomegaly, no signs of chronic liver disease  EXTEREMITIES:  no cyanosis, clubbing or edema  SKIN:  No rash/erythema/ecchymoses/petechiae/wounds/abscess/warm/dry  NEURO:  Alert, oriented, no asterixis, no tremor, no encephalopathy      LABS:                        7.6    7.69  )-----------( 186      ( 2017 06:00 )             24.2     -    140  |  110<H>  |  28<H>  ----------------------------<  104<H>  4.8   |  19<L>  |  1.46<H>    Ca    9.1      2017 06:00  Phos  3.1     -  Mg     1.9     24    TPro  6.9  /  Alb  3.5  /  TBili  0.3  /  DBili  x   /  AST  28  /  ALT  17  /  AlkPhos  58  24    PT/INR - ( 2017 06:00 )   PT: 13.7 sec;   INR: 1.21 ratio         PTT - ( 2017 06:00 )  PTT:28.2 sec  Urinalysis Basic - ( 2017 14:21 )    Color: Yellow / Appearance: Clear / S.016 / pH: x  Gluc: x / Ketone: Negative  / Bili: Negative / Urobili: Negative mg/dL   Blood: x / Protein: Negative mg/dL / Nitrite: Negative   Leuk Esterase: Negative / RBC: 1 /HPF / WBC 0 /HPF   Sq Epi: x / Non Sq Epi: 1 /HPF / Bacteria: Negative        RADIOLOGY & ADDITIONAL TESTS: ----
INTERVAL HPI/OVERNIGHT EVENTS:  Pt S/E -- without abdominal pain, nausea  Tolerating regular diet   BM x 1 this AM -- still w/ "black" stool per pt     MEDICATIONS  (STANDING):  pantoprazole  Injectable 40 milliGRAM(s) IV Push two times a day  lisinopril 10 milliGRAM(s) Oral daily  tamsulosin 0.4 milliGRAM(s) Oral at bedtime  atorvastatin 10 milliGRAM(s) Oral at bedtime  amLODIPine   Tablet 10 milliGRAM(s) Oral daily  sodium chloride 0.9%. 1000 milliLiter(s) (40 mL/Hr) IV Continuous <Continuous>  insulin lispro (HumaLOG) corrective regimen sliding scale   SubCutaneous three times a day before meals  insulin lispro (HumaLOG) corrective regimen sliding scale   SubCutaneous at bedtime  dextrose 5%. 1000 milliLiter(s) (50 mL/Hr) IV Continuous <Continuous>  dextrose 50% Injectable 12.5 Gram(s) IV Push once  dextrose 50% Injectable 25 Gram(s) IV Push once  dextrose 50% Injectable 25 Gram(s) IV Push once    MEDICATIONS  (PRN):  dextrose Gel 1 Dose(s) Oral once PRN Blood Glucose LESS THAN 70 milliGRAM(s)/deciliter  glucagon  Injectable 1 milliGRAM(s) IntraMuscular once PRN Glucose LESS THAN 70 milligrams/deciliter  acetaminophen   Tablet. 650 milliGRAM(s) Oral every 6 hours PRN Moderate Pain (4 - 6)      Allergies    Cipro (Pruritus; Rash)    Intolerances: ----    ROS:   General:  No wt loss, fevers, chills, night sweats  Eyes:  Good vision, no reported pain  ENT:  No sore throat, pain, runny nose, dysphagia  CV:  No pain, palpitations, hypo/hypertension  Resp:  No dyspnea, cough, tachypnea, wheezing  GI:  No pain, No nausea, No vomiting, No diarrhea, No constipation, No weight loss, No fever, No pruritis, No rectal bleeding, No dysphagia,  :  No pain, bleeding, incontinence, nocturia  Muscle:  No pain, + weakness  Neuro:  No weakness, tingling, memory problems  Psych:  No fatigue, insomnia, mood problems, depression  Endocrine:  No polyuria, polydipsia, cold/heat intolerance  Heme:  No petechiae, ecchymosis, easy bruisability  Skin:  No rash, tattoos, scars, edema      PHYSICAL EXAM:   Vital Signs:   Vital Signs Last 24 Hrs  T(C): 36.8 (2017 04:21), Max: 36.8 (2017 11:35)  T(F): 98.3 (2017 04:21), Max: 98.3 (2017 04:21)  HR: 80 (2017 04:21) (76 - 80)  BP: 128/56 (2017 04:21) (113/57 - 128/56)  BP(mean): --  RR: 18 (2017 04:21) (17 - 18)  SpO2: 98% (2017 04:21) (97% - 99%)  Daily     Daily Weight in k.9 (2017 07:11)      GENERAL:  Appears stated age, well-groomed, well-nourished, no distress, OOB to chair   HEENT:  NC/AT,  conjunctivae clear and pink, no thyromegaly, nodules, adenopathy, no JVD, sclera -anicteric  CHEST:  Full & symmetric excursion, no increased effort, breath sounds clear  HEART:  Regular rhythm, S1, S2, no murmur/rub/S3/S4, no abdominal bruit, no edema  ABDOMEN:  Soft, non-tender, non-distended, normoactive bowel sounds,  no masses ,no hepato-splenomegaly, no signs of chronic liver disease  EXTEREMITIES:  no cyanosis, clubbing or edema  SKIN:  No rash/erythema/ecchymoses/petechiae/wounds/abscess/warm/dry  NEURO:  Alert, oriented, no asterixis, no tremor, no encephalopathy      LABS:                        8.0    8.11  )-----------( 170      ( 2017 07:02 )             24.6     06-26    137  |  107  |  22  ----------------------------<  145<H>  4.3   |  18<L>  |  1.33<H>    Ca    8.8      2017 07:01      PT/INR - ( 2017 07:02 )   PT: 12.7 sec;   INR: 1.12 ratio         PTT - ( 2017 07:02 )  PTT:26.2 sec      RADIOLOGY & ADDITIONAL TESTS: ----    < from: Upper Endoscopy (17 @ 15:17) >  Procedure:           After obtaining informed consent, the endoscope was passed under direct                        vision. Throughout the procedure, the patient'sblood pressure, pulse, and                        oxygen saturations were monitored continuously. The Endoscope was introduced                        through the mouth, and advanced to the second part of duodenum. The upper GI                        endoscopy was accomplished without difficulty. The patient tolerated the                        procedure well.                                                                                                        Findings:       No gross lesions were noted in the entire esophagus.       Localized moderate inflammation was found in the gastric antrum. Biopsies were taken with a        cold forceps for histology.       No gross lesions were noted in the duodenal bulb, in the first part of the duodenum and at        2nd part of the duodenum.                                                                                                        Impression:          - No gross lesions in esophagus.                       - Gastritis. Biopsied.                   - No gross lesions in duodenum.  Recommendation:      - Await pathology results.
INTERVAL HPI/OVERNIGHT EVENTS:  Pt S/E -- without abdominal pain, nausea  Was NPO this AM for capsule study  BM x 1 yesterday - black stool     MEDICATIONS  (STANDING):  pantoprazole  Injectable 40 milliGRAM(s) IV Push two times a day  lisinopril 10 milliGRAM(s) Oral daily  tamsulosin 0.4 milliGRAM(s) Oral at bedtime  atorvastatin 10 milliGRAM(s) Oral at bedtime  amLODIPine   Tablet 10 milliGRAM(s) Oral daily  sodium chloride 0.9%. 1000 milliLiter(s) (40 mL/Hr) IV Continuous <Continuous>  insulin lispro (HumaLOG) corrective regimen sliding scale   SubCutaneous three times a day before meals  insulin lispro (HumaLOG) corrective regimen sliding scale   SubCutaneous at bedtime  dextrose 5%. 1000 milliLiter(s) (50 mL/Hr) IV Continuous <Continuous>  dextrose 50% Injectable 12.5 Gram(s) IV Push once  dextrose 50% Injectable 25 Gram(s) IV Push once  dextrose 50% Injectable 25 Gram(s) IV Push once    MEDICATIONS  (PRN):  dextrose Gel 1 Dose(s) Oral once PRN Blood Glucose LESS THAN 70 milliGRAM(s)/deciliter  glucagon  Injectable 1 milliGRAM(s) IntraMuscular once PRN Glucose LESS THAN 70 milligrams/deciliter  acetaminophen   Tablet. 650 milliGRAM(s) Oral every 6 hours PRN Moderate Pain (4 - 6)        Allergies    Cipro (Pruritus; Rash)    Intolerances: ----    ROS:   General:  No wt loss, fevers, chills, night sweats  Eyes:  Good vision, no reported pain  ENT:  No sore throat, pain, runny nose, dysphagia  CV:  No pain, palpitations, hypo/hypertension  Resp:  No dyspnea, cough, tachypnea, wheezing  GI:  No pain, No nausea, No vomiting, No diarrhea, No constipation, No weight loss, No fever, No pruritis, No rectal bleeding, No dysphagia,  :  No pain, bleeding, incontinence, nocturia  Muscle:  No pain, + weakness  Neuro:  No weakness, tingling, memory problems  Psych:  No fatigue, insomnia, mood problems, depression  Endocrine:  No polyuria, polydipsia, cold/heat intolerance  Heme:  No petechiae, ecchymosis, easy bruisability  Skin:  No rash, tattoos, scars, edema      PHYSICAL EXAM:   Vital Signs:   Vital Signs Last 24 Hrs  T(C): 36.9 (2017 04:26), Max: 36.9 (2017 04:26)  T(F): 98.4 (2017 04:26), Max: 98.4 (2017 04:26)  HR: 70 (:26) (70 - 80)  BP: 137/57 (2017 04:26) (120/55 - 137/57)  BP(mean): --  RR: 18 (:26) (16 - 18)  SpO2: 98% (2017 04:26) (98% - 98%)  Daily     Daily Weight in k.5 (2017 07:22)      GENERAL:  OOB to chair, resting comfortably, in nad    HEENT:  NC/AT,  conjunctivae clear and pink, no thyromegaly, nodules, adenopathy, no JVD, sclera -anicteric  CHEST:  Full & symmetric excursion, no increased effort, breath sounds clear  HEART:  Regular rhythm, S1, S2, no murmur/rub/S3/S4, no abdominal bruit, no edema  ABDOMEN:  Soft, non-tender, non-distended, normoactive bowel sounds,  no masses ,no hepato-splenomegaly, no signs of chronic liver disease, wearing recorder for capsule study  EXTEREMITIES:  no cyanosis, clubbing or edema  SKIN:  No rash/erythema/ecchymoses/petechiae/wounds/abscess/warm/dry  NEURO:  Alert, oriented, no asterixis, no tremor, no encephalopathy      LABS:                        8.1    10.3  )-----------( 143      ( 2017 04:06 )             23.6     -    142  |  110<H>  |  22  ----------------------------<  147<H>  4.6   |  19<L>  |  1.49<H>    Ca    8.5      2017 07:35      RADIOLOGY & ADDITIONAL TESTS: ----  capsule study in progress
INTERVAL HPI/OVERNIGHT EVENTS:  Pt S/E --OOB to chair w/o abdominal complaints   Tolerating Diet   BM x 1 still w/ black stools - s/p capsule study    MEDICATIONS  (STANDING):  pantoprazole  Injectable 40 milliGRAM(s) IV Push two times a day  lisinopril 10 milliGRAM(s) Oral daily  tamsulosin 0.4 milliGRAM(s) Oral at bedtime  atorvastatin 10 milliGRAM(s) Oral at bedtime  amLODIPine   Tablet 10 milliGRAM(s) Oral daily  sodium chloride 0.9%. 1000 milliLiter(s) (40 mL/Hr) IV Continuous <Continuous>  insulin lispro (HumaLOG) corrective regimen sliding scale   SubCutaneous three times a day before meals  insulin lispro (HumaLOG) corrective regimen sliding scale   SubCutaneous at bedtime  dextrose 5%. 1000 milliLiter(s) (50 mL/Hr) IV Continuous <Continuous>  dextrose 50% Injectable 12.5 Gram(s) IV Push once  dextrose 50% Injectable 25 Gram(s) IV Push once  dextrose 50% Injectable 25 Gram(s) IV Push once    MEDICATIONS  (PRN):  dextrose Gel 1 Dose(s) Oral once PRN Blood Glucose LESS THAN 70 milliGRAM(s)/deciliter  glucagon  Injectable 1 milliGRAM(s) IntraMuscular once PRN Glucose LESS THAN 70 milligrams/deciliter  acetaminophen   Tablet. 650 milliGRAM(s) Oral every 6 hours PRN Moderate Pain (4 - 6)      Allergies    Cipro (Pruritus; Rash)    Intolerances: ----    ROS:   General:  No wt loss, fevers, chills, night sweats  Eyes:  Good vision, no reported pain  ENT:  No sore throat, pain, runny nose, dysphagia  CV:  No pain, palpitations, hypo/hypertension  Resp:  No dyspnea, cough, tachypnea, wheezing  GI:  No pain, No nausea, No vomiting, No diarrhea, No constipation, No weight loss, No fever, No pruritis, No rectal bleeding, No dysphagia,  :  No pain, bleeding, incontinence, nocturia  Muscle:  No pain, + weakness  Neuro:  No weakness, tingling, memory problems  Psych:  No fatigue, insomnia, mood problems, depression  Endocrine:  No polyuria, polydipsia, cold/heat intolerance  Heme:  No petechiae, ecchymosis, easy bruisability  Skin:  No rash, tattoos, scars, edema      PHYSICAL EXAM:   Vital Signs:   Vital Signs Last 24 Hrs  T(C): 36.6 (29 Jun 2017 04:18), Max: 36.6 (28 Jun 2017 20:57)  T(F): 97.8 (29 Jun 2017 04:18), Max: 97.9 (28 Jun 2017 20:57)  HR: 72 (29 Jun 2017 04:18) (72 - 78)  BP: 136/58 (29 Jun 2017 04:18) (135/64 - 138/55)  BP(mean): --  RR: 18 (29 Jun 2017 04:18) (17 - 18)  SpO2: 98% (29 Jun 2017 04:18) (98% - 100%)  Daily     Daily       GENERAL:  OOB to chair, in nad    HEENT:  NC/AT,  conjunctivae clear and pink, no thyromegaly, nodules, adenopathy, no JVD, sclera -anicteric  CHEST:  Full & symmetric excursion, no increased effort, breath sounds clear  HEART:  Regular rhythm, S1, S2, no murmur/rub/S3/S4, no abdominal bruit, no edema  ABDOMEN:  Soft, non-tender, non-distended, normoactive bowel sounds,  no masses ,no hepato-splenomegaly, no signs of chronic liver disease, wearing recorder for capsule study  EXTEREMITIES:  no cyanosis, clubbing or edema  SKIN:  No rash/erythema/ecchymoses/petechiae/wounds/abscess/warm/dry  NEURO:  Alert, oriented, no asterixis, no tremor, no encephalopathy      LABS:                        7.1    8.87  )-----------( 174      ( 29 Jun 2017 07:19 )             21.6     06-29    137  |  105  |  19  ----------------------------<  160<H>  4.2   |  20<L>  |  1.08    Ca    8.2<L>      29 Jun 2017 07:19        RADIOLOGY & ADDITIONAL TESTS: ----  awaiting results of capsule study
INTERVAL HPI/OVERNIGHT EVENTS:  Pt S/E --OOB to chair w/o abdominal complaints   Tolerating Diet   BM x 1 yesterday still w/ black stools    MEDICATIONS  (STANDING):  pantoprazole  Injectable 40 milliGRAM(s) IV Push two times a day  lisinopril 10 milliGRAM(s) Oral daily  tamsulosin 0.4 milliGRAM(s) Oral at bedtime  atorvastatin 10 milliGRAM(s) Oral at bedtime  amLODIPine   Tablet 10 milliGRAM(s) Oral daily  sodium chloride 0.9%. 1000 milliLiter(s) (40 mL/Hr) IV Continuous <Continuous>  insulin lispro (HumaLOG) corrective regimen sliding scale   SubCutaneous three times a day before meals  insulin lispro (HumaLOG) corrective regimen sliding scale   SubCutaneous at bedtime  dextrose 5%. 1000 milliLiter(s) (50 mL/Hr) IV Continuous <Continuous>  dextrose 50% Injectable 12.5 Gram(s) IV Push once  dextrose 50% Injectable 25 Gram(s) IV Push once  dextrose 50% Injectable 25 Gram(s) IV Push once    MEDICATIONS  (PRN):  dextrose Gel 1 Dose(s) Oral once PRN Blood Glucose LESS THAN 70 milliGRAM(s)/deciliter  glucagon  Injectable 1 milliGRAM(s) IntraMuscular once PRN Glucose LESS THAN 70 milligrams/deciliter  acetaminophen   Tablet. 650 milliGRAM(s) Oral every 6 hours PRN Moderate Pain (4 - 6)        Allergies    Cipro (Pruritus; Rash)    Intolerances: ----    ROS:   General:  No wt loss, fevers, chills, night sweats  Eyes:  Good vision, no reported pain  ENT:  No sore throat, pain, runny nose, dysphagia  CV:  No pain, palpitations, hypo/hypertension  Resp:  No dyspnea, cough, tachypnea, wheezing  GI:  No pain, No nausea, No vomiting, No diarrhea, No constipation, No weight loss, No fever, No pruritis, No rectal bleeding, No dysphagia,  :  No pain, bleeding, incontinence, nocturia  Muscle:  No pain, + weakness  Neuro:  No weakness, tingling, memory problems  Psych:  No fatigue, insomnia, mood problems, depression  Endocrine:  No polyuria, polydipsia, cold/heat intolerance  Heme:  No petechiae, ecchymosis, easy bruisability  Skin:  No rash, tattoos, scars, edema      PHYSICAL EXAM:   Vital Signs:   Vital Signs Last 24 Hrs  T(C): 36.7 (2017 06:10), Max: 36.8 (2017 18:15)  T(F): 98 (2017 06:10), Max: 98.2 (2017 18:15)  HR: 76 (2017 06:10) (76 - 86)  BP: 141/62 (2017 06:10) (132/63 - 143/60)  BP(mean): --  RR: 18 (2017 06:10) (18 - 18)  SpO2: 98% (2017 06:10) (98% - 100%)  Daily     Daily Weight in k (2017 07:49)      GENERAL:  OOB to chair, in no acute distress    HEENT:  NC/AT,  conjunctivae clear and pink, no thyromegaly, nodules, adenopathy, no JVD, sclera -anicteric  CHEST:  Full & symmetric excursion, no increased effort, breath sounds clear  HEART:  Regular rhythm, S1, S2, no murmur/rub/S3/S4, no abdominal bruit, no edema  ABDOMEN:  Soft, non-tender, non-distended, normoactive bowel sounds,  no masses ,no hepato-splenomegaly, no signs of chronic liver disease  EXTEREMITIES:  no cyanosis, clubbing or edema  SKIN:  No rash/erythema/ecchymoses/petechiae/wounds/abscess/warm/dry  NEURO:  Alert, oriented, no asterixis, no tremor, no encephalopathy      LABS:                        8.0    8.62  )-----------( 172      ( 2017 07:15 )             24.3     06    142  |  108  |  15  ----------------------------<  165<H>  4.0   |  18<L>  |  1.33<H>    Ca    8.6      2017 07:15      RADIOLOGY & ADDITIONAL TESTS: ----  capsule study + for colonic bleed
Post-Anesthetic Evaluation:    The Patient was interviewed and evaluated    Vital Signs Last 24 Hrs  T(C): 36.9 (04 Jul 2017 04:05), Max: 36.9 (04 Jul 2017 04:05)  T(F): 98.5 (04 Jul 2017 04:05), Max: 98.5 (04 Jul 2017 04:05)  HR: 76 (04 Jul 2017 04:05) (70 - 81)  BP: 142/58 (04 Jul 2017 04:05) (135/66 - 142/58)  BP(mean): --  RR: 18 (04 Jul 2017 04:05) (17 - 18)  SpO2: 99% (04 Jul 2017 04:05) (98% - 99%)    Evaluation:      (X) No apparent complications or complaints regarding anesthesia care at this time  (X) All questions were answered    Condition:  (X) Stable      ( ) Guarded      ( ) Critical    Recommendations:  (X) None     ( ) Other:
Pre-Endoscopy Evaluation      Referring Physician:  Stacey Sullivan MD                                  Procedure: EGD    Indication for Procedure: GIB    Pertinent History: 89 yo male with PMH below admitted with melenic stools, acute blood loss anemia    Sedation by Anesthesia [ x]    PAST MEDICAL & SURGICAL HISTORY:  Vocal cord polyp: pending treatment diagnosed in   BPH (benign prostatic hypertrophy)  PVD (peripheral vascular disease)  LBBB (left bundle branch block): incomplete  HTN (hypertension)  Hypercholesterolemia  DM2 (diabetes mellitus, type 2)  AS (aortic stenosis)  No significant past surgical history      PMH of Gastroparesis [ ]  Gastric Surgery [ ]  Gastric Outlet Obstruction [ ] no    Allergies    Cipro (Pruritus; Rash)    Latex allergy: [ ] yes [x] no    Medications:MEDICATIONS  (STANDING):  pantoprazole  Injectable 40milliGRAM(s) IV Push two times a day  lisinopril 10milliGRAM(s) Oral daily  tamsulosin 0.4milliGRAM(s) Oral at bedtime  atorvastatin 10milliGRAM(s) Oral at bedtime  amLODIPine   Tablet 10milliGRAM(s) Oral daily  sodium chloride 0.9%. 1000milliLiter(s) IV Continuous <Continuous>  insulin lispro (HumaLOG) corrective regimen sliding scale  SubCutaneous three times a day before meals  insulin lispro (HumaLOG) corrective regimen sliding scale  SubCutaneous at bedtime  dextrose 5%. 1000milliLiter(s) IV Continuous <Continuous>  dextrose 50% Injectable 12.5Gram(s) IV Push once  dextrose 50% Injectable 25Gram(s) IV Push once  dextrose 50% Injectable 25Gram(s) IV Push once    MEDICATIONS  (PRN):  dextrose Gel 1Dose(s) Oral once PRN Blood Glucose LESS THAN 70 milliGRAM(s)/deciliter  glucagon  Injectable 1milliGRAM(s) IntraMuscular once PRN Glucose LESS THAN 70 milligrams/deciliter  acetaminophen   Tablet. 650milliGRAM(s) Oral every 6 hours PRN Moderate Pain (4 - 6)      Smoking: [ ] yes  [x ] no    AICD/PPM: [ ] yes   [ x no    Pertinent lab data:                        8.0    8.11  )-----------( 170      ( 2017 07:02 )             24.6     06-26    137  |  107  |  22  ----------------------------<  145<H>  4.3   |  18<L>  |  1.33<H>    Ca    8.8      2017 07:01      PT/INR - ( 2017 07:02 )   PT: 12.7 sec;   INR: 1.12 ratio         PTT - ( 2017 07:02 )  PTT:26.2 sec      Physical Examination:  Daily Height in cm: 167.64 (2017 22:57)    Daily Weight in k.5 (2017 07:34)  Vital Signs Last 24 Hrs  T(C): 36.8, Max: 36.8 ( @ 11:35)  T(F): 98.2, Max: 98.2 ( @ 11:35)  HR: 80 (74 - 80)  BP: 126/64 (121/50 - 141/63)  BP(mean): --  RR: 17 (17 - 18)  SpO2: 99% (99% - 100%)    BP:  119/53       HR:  78      SPO2:   100%    Temperature:     Constitutional: NAD    HEENT: PERRLA, EOMI,       Neck:  No JVD    Respiratory: CTAB/L    Cardiovascular: S1 and S2    Gastrointestinal: BS+, soft, NT/ND    Extremities: No peripheral edema    Neurological: A/O x 3, no focal deficits    Psychiatric: Normal mood, normal affect    : No Shirley    Skin: No rashes    Comments:    ASA Class: I [ ]  II [ ]  III [ ]  IV [ x    The patient is a suitable candidate for the planned procedure unless box checked [ ]  No, explain:
SUBJECTIVE / OVERNIGHT EVENTS: No nausea, vomiting or diarrhea, no fever or chills, no dizziness or chest pain, no dysuria or hematuria .    Vital Signs Last 24 Hrs  T(C): 36.6 (06-29-17 @ 04:18), Max: 36.6 (06-28-17 @ 20:57)  HR: 72 (06-29-17 @ 04:18) (72 - 78)  BP: 136/58 (06-29-17 @ 04:18) (135/64 - 138/55)  RR: 18 (06-29-17 @ 04:18) (17 - 18)  SpO2: 98% (06-29-17 @ 04:18) (98% - 100%)  CAPILLARY BLOOD GLUCOSE  189 (29 Jun 2017 07:49)  244 (28 Jun 2017 20:57)  158 (28 Jun 2017 16:52)  176 (28 Jun 2017 11:25)        I&O's Summary    28 Jun 2017 07:01  -  29 Jun 2017 07:00  --------------------------------------------------------  IN: 1920 mL / OUT: 2100 mL / NET: -180 mL        PHYSICAL EXAM:  HEAD:  Atraumatic, Normocephalic  EYES: EOMI, PERRLA, conjunctiva and sclera clear  NECK: Supple, No JVD  CHEST/LUNG: Clear to auscultation bilaterally; No wheeze  HEART: Regular rate and rhythm; No murmurs, rubs, or gallops  ABDOMEN: Soft, Nontender, Nondistended; Bowel sounds present  EXTREMITIES:  2+ Peripheral Pulses, No clubbing, cyanosis, or edema  PSYCH: AAOx3  NEUROLOGY: non-focal  SKIN: No rashes or lesions    MEDICATIONS  (STANDING):  pantoprazole  Injectable 40 milliGRAM(s) IV Push two times a day  lisinopril 10 milliGRAM(s) Oral daily  tamsulosin 0.4 milliGRAM(s) Oral at bedtime  atorvastatin 10 milliGRAM(s) Oral at bedtime  amLODIPine   Tablet 10 milliGRAM(s) Oral daily  sodium chloride 0.9%. 1000 milliLiter(s) (40 mL/Hr) IV Continuous <Continuous>  insulin lispro (HumaLOG) corrective regimen sliding scale   SubCutaneous three times a day before meals  insulin lispro (HumaLOG) corrective regimen sliding scale   SubCutaneous at bedtime  dextrose 5%. 1000 milliLiter(s) (50 mL/Hr) IV Continuous <Continuous>  dextrose 50% Injectable 12.5 Gram(s) IV Push once  dextrose 50% Injectable 25 Gram(s) IV Push once  dextrose 50% Injectable 25 Gram(s) IV Push once    MEDICATIONS  (PRN):  dextrose Gel 1 Dose(s) Oral once PRN Blood Glucose LESS THAN 70 milliGRAM(s)/deciliter  glucagon  Injectable 1 milliGRAM(s) IntraMuscular once PRN Glucose LESS THAN 70 milligrams/deciliter  acetaminophen   Tablet. 650 milliGRAM(s) Oral every 6 hours PRN Moderate Pain (4 - 6)      LABS:                        8.1    10.3  )-----------( 143      ( 28 Jun 2017 04:06 )             23.6     06-29    137  |  105  |  19  ----------------------------<  160<H>  4.2   |  20<L>  |  1.08    Ca    8.2<L>      29 Jun 2017 07:19                    Hemoglobin A1C, Whole Blood: 7.8 % (06-25 @ 06:00)        Cultures:    EKG:    Radiological Studies:    Consultant(s) Notes Reviewed:      Care Discussed with Consultants/Other Providers:
SUBJECTIVE / OVERNIGHT EVENTS: No nausea, vomiting or diarrhea, no fever or chills, no dizziness or chest pain, no dysuria or hematuria .    Vital Signs Last 24 Hrs  T(C): 36.6, Max: 36.6 (- @ 13:00)  HR: 74 (72 - 75)  BP: 121/50 (117/57 - 141/63)  RR: 18 (18 - 18)  SpO2: 100% (99% - 100%)  Wt(kg): --  CAPILLARY BLOOD GLUCOSE  158 (2017 07:34)  172 (2017 21:30)  185 (2017 16:45)  263 (2017 12:20)    I&O's Summary    I & Os for current day (as of 2017 11:18)  =============================================  IN: 1435 ml / OUT: 1250 ml / NET: 185 ml      PHYSICAL EXAM:  HEAD:  Atraumatic, Normocephalic  EYES: EOMI, PERRLA, conjunctiva and sclera clear  NECK: Supple, No JVD  CHEST/LUNG: Clear to auscultation bilaterally; No wheeze  HEART: Regular rate and rhythm; No murmurs, rubs, or gallops  ABDOMEN: Soft, Nontender, Nondistended; Bowel sounds present  EXTREMITIES:  2+ Peripheral Pulses, No clubbing, cyanosis, or edema  PSYCH: AAOx3  NEUROLOGY: non-focal  SKIN: No rashes or lesions    MEDICATIONS  (STANDING):  pantoprazole  Injectable 40milliGRAM(s) IV Push two times a day  lisinopril 10milliGRAM(s) Oral daily  tamsulosin 0.4milliGRAM(s) Oral at bedtime  atorvastatin 10milliGRAM(s) Oral at bedtime  amLODIPine   Tablet 10milliGRAM(s) Oral daily  sodium chloride 0.9%. 1000milliLiter(s) IV Continuous <Continuous>  insulin lispro (HumaLOG) corrective regimen sliding scale  SubCutaneous three times a day before meals  insulin lispro (HumaLOG) corrective regimen sliding scale  SubCutaneous at bedtime  dextrose 5%. 1000milliLiter(s) IV Continuous <Continuous>  dextrose 50% Injectable 12.5Gram(s) IV Push once  dextrose 50% Injectable 25Gram(s) IV Push once  dextrose 50% Injectable 25Gram(s) IV Push once    MEDICATIONS  (PRN):  dextrose Gel 1Dose(s) Oral once PRN Blood Glucose LESS THAN 70 milliGRAM(s)/deciliter  glucagon  Injectable 1milliGRAM(s) IntraMuscular once PRN Glucose LESS THAN 70 milligrams/deciliter  acetaminophen   Tablet. 650milliGRAM(s) Oral every 6 hours PRN Moderate Pain (4 - 6)      LABS:                        8.0    8.11  )-----------( 170      ( 2017 07:02 )             24.6         137  |  107  |  22  ----------------------------<  145<H>  4.3   |  18<L>  |  1.33<H>    Ca    8.8      2017 07:01      PT/INR - ( 2017 07:02 )   PT: 12.7 sec;   INR: 1.12 ratio         PTT - ( 2017 07:02 )  PTT:26.2 sec      Urinalysis Basic - ( 2017 14:21 )    Color: Yellow / Appearance: Clear / S.016 / pH: x  Gluc: x / Ketone: Negative  / Bili: Negative / Urobili: Negative mg/dL   Blood: x / Protein: Negative mg/dL / Nitrite: Negative   Leuk Esterase: Negative / RBC: 1 /HPF / WBC 0 /HPF   Sq Epi: x / Non Sq Epi: 1 /HPF / Bacteria: Negative            Hemoglobin A1C, Whole Blood: 7.8 % ( @ 06:00)        Cultures:    EKG:    Radiological Studies:    Consultant(s) Notes Reviewed:      Care Discussed with Consultants/Other Providers:
SUBJECTIVE / OVERNIGHT EVENTS: No nausea, vomiting or diarrhea, no fever or chills, no dizziness or chest pain, no dysuria or hematuria .    Vital Signs Last 24 Hrs  T(C): 36.7 (06-30-17 @ 06:10), Max: 36.8 (06-29-17 @ 18:15)  HR: 76 (06-30-17 @ 06:10) (76 - 86)  BP: 141/62 (06-30-17 @ 06:10) (132/63 - 143/60)  RR: 18 (06-30-17 @ 06:10) (18 - 18)  SpO2: 98% (06-30-17 @ 06:10) (98% - 100%)  CAPILLARY BLOOD GLUCOSE  179 (30 Jun 2017 07:49)  197 (29 Jun 2017 21:15)  265 (29 Jun 2017 16:37)  297 (29 Jun 2017 11:48)        I&O's Summary    29 Jun 2017 07:01  -  30 Jun 2017 07:00  --------------------------------------------------------  IN: 1200 mL / OUT: 0 mL / NET: 1200 mL        PHYSICAL EXAM:  HEAD:  Atraumatic, Normocephalic  EYES: EOMI, PERRLA, conjunctiva and sclera clear  NECK: Supple, No JVD  CHEST/LUNG: Clear to auscultation bilaterally; No wheeze  HEART: Regular rate and rhythm; No murmurs, rubs, or gallops  ABDOMEN: Soft, Nontender, Nondistended; Bowel sounds present  EXTREMITIES:  2+ Peripheral Pulses, No clubbing, cyanosis, or edema  PSYCH: AAOx3  NEUROLOGY: non-focal  SKIN: No rashes or lesions    MEDICATIONS  (STANDING):  pantoprazole  Injectable 40 milliGRAM(s) IV Push two times a day  lisinopril 10 milliGRAM(s) Oral daily  tamsulosin 0.4 milliGRAM(s) Oral at bedtime  atorvastatin 10 milliGRAM(s) Oral at bedtime  amLODIPine   Tablet 10 milliGRAM(s) Oral daily  sodium chloride 0.9%. 1000 milliLiter(s) (40 mL/Hr) IV Continuous <Continuous>  insulin lispro (HumaLOG) corrective regimen sliding scale   SubCutaneous three times a day before meals  insulin lispro (HumaLOG) corrective regimen sliding scale   SubCutaneous at bedtime  dextrose 5%. 1000 milliLiter(s) (50 mL/Hr) IV Continuous <Continuous>  dextrose 50% Injectable 12.5 Gram(s) IV Push once  dextrose 50% Injectable 25 Gram(s) IV Push once  dextrose 50% Injectable 25 Gram(s) IV Push once    MEDICATIONS  (PRN):  dextrose Gel 1 Dose(s) Oral once PRN Blood Glucose LESS THAN 70 milliGRAM(s)/deciliter  glucagon  Injectable 1 milliGRAM(s) IntraMuscular once PRN Glucose LESS THAN 70 milligrams/deciliter  acetaminophen   Tablet. 650 milliGRAM(s) Oral every 6 hours PRN Moderate Pain (4 - 6)      LABS:                        8.7    10.1  )-----------( 161      ( 29 Jun 2017 23:47 )             25.1     06-29    137  |  105  |  19  ----------------------------<  160<H>  4.2   |  20<L>  |  1.08    Ca    8.2<L>      29 Jun 2017 07:19                    Hemoglobin A1C, Whole Blood: 7.8 % (06-25 @ 06:00)        Cultures:    EKG:    Radiological Studies:    Consultant(s) Notes Reviewed:      Care Discussed with Consultants/Other Providers:
SUBJECTIVE / OVERNIGHT EVENTS: No nausea, vomiting or diarrhea, no fever or chills, no dizziness or chest pain, no dysuria or hematuria .    Vital Signs Last 24 Hrs  T(C): 36.8 (06-27-17 @ 04:21), Max: 36.8 (06-26-17 @ 11:35)  HR: 80 (06-27-17 @ 04:21) (76 - 80)  BP: 128/56 (06-27-17 @ 04:21) (113/57 - 128/56)  RR: 18 (06-27-17 @ 04:21) (17 - 18)  SpO2: 98% (06-27-17 @ 04:21) (97% - 99%)  CAPILLARY BLOOD GLUCOSE  164 (27 Jun 2017 07:11)  228 (26 Jun 2017 20:42)  195 (26 Jun 2017 12:03)        I&O's Summary    26 Jun 2017 07:01  -  27 Jun 2017 07:00  --------------------------------------------------------  IN: 1190 mL / OUT: 1100 mL / NET: 90 mL        PHYSICAL EXAM:  HEAD:  Atraumatic, Normocephalic  EYES: EOMI, PERRLA, conjunctiva and sclera clear  NECK: Supple, No JVD  CHEST/LUNG: Clear to auscultation bilaterally; No wheeze  HEART: Regular rate and rhythm; No murmurs, rubs, or gallops  ABDOMEN: Soft, Nontender, Nondistended; Bowel sounds present  EXTREMITIES:  2+ Peripheral Pulses, No clubbing, cyanosis, or edema  PSYCH: AAOx3  NEUROLOGY: non-focal  SKIN: No rashes or lesions    MEDICATIONS  (STANDING):  pantoprazole  Injectable 40 milliGRAM(s) IV Push two times a day  lisinopril 10 milliGRAM(s) Oral daily  tamsulosin 0.4 milliGRAM(s) Oral at bedtime  atorvastatin 10 milliGRAM(s) Oral at bedtime  amLODIPine   Tablet 10 milliGRAM(s) Oral daily  sodium chloride 0.9%. 1000 milliLiter(s) (40 mL/Hr) IV Continuous <Continuous>  insulin lispro (HumaLOG) corrective regimen sliding scale   SubCutaneous three times a day before meals  insulin lispro (HumaLOG) corrective regimen sliding scale   SubCutaneous at bedtime  dextrose 5%. 1000 milliLiter(s) (50 mL/Hr) IV Continuous <Continuous>  dextrose 50% Injectable 12.5 Gram(s) IV Push once  dextrose 50% Injectable 25 Gram(s) IV Push once  dextrose 50% Injectable 25 Gram(s) IV Push once    MEDICATIONS  (PRN):  dextrose Gel 1 Dose(s) Oral once PRN Blood Glucose LESS THAN 70 milliGRAM(s)/deciliter  glucagon  Injectable 1 milliGRAM(s) IntraMuscular once PRN Glucose LESS THAN 70 milligrams/deciliter  acetaminophen   Tablet. 650 milliGRAM(s) Oral every 6 hours PRN Moderate Pain (4 - 6)      LABS:                        8.0    8.11  )-----------( 170      ( 26 Jun 2017 07:02 )             24.6     06-26    137  |  107  |  22  ----------------------------<  145<H>  4.3   |  18<L>  |  1.33<H>    Ca    8.8      26 Jun 2017 07:01      PT/INR - ( 26 Jun 2017 07:02 )   PT: 12.7 sec;   INR: 1.12 ratio         PTT - ( 26 Jun 2017 07:02 )  PTT:26.2 sec              Hemoglobin A1C, Whole Blood: 7.8 % (06-25 @ 06:00)        Cultures:    EKG:    Radiological Studies:    Consultant(s) Notes Reviewed:      Care Discussed with Consultants/Other Providers:
SUBJECTIVE / OVERNIGHT EVENTS: No nausea, vomiting or diarrhea, no fever or chills, no dizziness or chest pain, no dysuria or hematuria .    Vital Signs Last 24 Hrs  T(C): 36.8 (07-01-17 @ 03:58), Max: 37.1 (06-30-17 @ 11:46)  HR: 79 (07-01-17 @ 03:58) (79 - 80)  BP: 121/64 (07-01-17 @ 03:58) (109/58 - 127/62)  RR: 18 (07-01-17 @ 03:58) (18 - 18)  SpO2: 97% (07-01-17 @ 03:58) (97% - 98%)  CAPILLARY BLOOD GLUCOSE  162 (01 Jul 2017 07:30)  198 (30 Jun 2017 21:38)  153 (30 Jun 2017 16:57)  277 (30 Jun 2017 11:46)        I&O's Summary    30 Jun 2017 07:01  -  01 Jul 2017 07:00  --------------------------------------------------------  IN: 960 mL / OUT: 1400 mL / NET: -440 mL        PHYSICAL EXAM:  HEAD:  Atraumatic, Normocephalic  EYES: EOMI, PERRLA, conjunctiva and sclera clear  NECK: Supple, No JVD  CHEST/LUNG: Clear to auscultation bilaterally; No wheeze  HEART: Regular rate and rhythm; No murmurs, rubs, or gallops  ABDOMEN: Soft, Nontender, Nondistended; Bowel sounds present  EXTREMITIES:  2+ Peripheral Pulses, No clubbing, cyanosis, or edema  PSYCH: AAOx3  NEUROLOGY: non-focal  SKIN: No rashes or lesions    MEDICATIONS  (STANDING):  pantoprazole  Injectable 40 milliGRAM(s) IV Push two times a day  lisinopril 10 milliGRAM(s) Oral daily  tamsulosin 0.4 milliGRAM(s) Oral at bedtime  atorvastatin 10 milliGRAM(s) Oral at bedtime  amLODIPine   Tablet 10 milliGRAM(s) Oral daily  sodium chloride 0.9%. 1000 milliLiter(s) (40 mL/Hr) IV Continuous <Continuous>  insulin lispro (HumaLOG) corrective regimen sliding scale   SubCutaneous three times a day before meals  insulin lispro (HumaLOG) corrective regimen sliding scale   SubCutaneous at bedtime  dextrose 5%. 1000 milliLiter(s) (50 mL/Hr) IV Continuous <Continuous>  dextrose 50% Injectable 12.5 Gram(s) IV Push once  dextrose 50% Injectable 25 Gram(s) IV Push once  dextrose 50% Injectable 25 Gram(s) IV Push once    MEDICATIONS  (PRN):  dextrose Gel 1 Dose(s) Oral once PRN Blood Glucose LESS THAN 70 milliGRAM(s)/deciliter  glucagon  Injectable 1 milliGRAM(s) IntraMuscular once PRN Glucose LESS THAN 70 milligrams/deciliter  acetaminophen   Tablet. 650 milliGRAM(s) Oral every 6 hours PRN Moderate Pain (4 - 6)      LABS:                        8.0    8.62  )-----------( 172      ( 30 Jun 2017 07:15 )             24.3     06-30    142  |  108  |  15  ----------------------------<  165<H>  4.0   |  18<L>  |  1.33<H>    Ca    8.6      30 Jun 2017 07:15                    Hemoglobin A1C, Whole Blood: 7.8 % (06-25 @ 06:00)        Cultures:    EKG:    Radiological Studies:    Consultant(s) Notes Reviewed:      Care Discussed with Consultants/Other Providers:
SUBJECTIVE / OVERNIGHT EVENTS: No nausea, vomiting or diarrhea, no fever or chills, no dizziness or chest pain, no dysuria or hematuria .    Vital Signs Last 24 Hrs  T(C): 36.8 (07-03-17 @ 04:11), Max: 36.8 (07-02-17 @ 11:31)  HR: 79 (07-03-17 @ 04:11) (79 - 89)  BP: 118/74 (07-03-17 @ 04:11) (108/72 - 126/84)  RR: 17 (07-03-17 @ 04:11) (16 - 17)  SpO2: 98% (07-03-17 @ 04:11) (98% - 99%)  CAPILLARY BLOOD GLUCOSE  137 (03 Jul 2017 07:37)  165 (02 Jul 2017 21:55)  354 (02 Jul 2017 16:43)  222 (02 Jul 2017 11:31)        I&O's Summary    02 Jul 2017 07:01  -  03 Jul 2017 07:00  --------------------------------------------------------  IN: 3985 mL / OUT: 3300 mL / NET: 685 mL        PHYSICAL EXAM:  HEAD:  Atraumatic, Normocephalic  EYES: EOMI, PERRLA, conjunctiva and sclera clear  NECK: Supple, No JVD  CHEST/LUNG: Clear to auscultation bilaterally; No wheeze  HEART: Regular rate and rhythm; No murmurs, rubs, or gallops  ABDOMEN: Soft, Nontender, Nondistended; Bowel sounds present  EXTREMITIES:  2+ Peripheral Pulses, No clubbing, cyanosis, or edema  PSYCH: AAOx3  NEUROLOGY: non-focal  SKIN: No rashes or lesions    MEDICATIONS  (STANDING):  pantoprazole  Injectable 40 milliGRAM(s) IV Push two times a day  lisinopril 10 milliGRAM(s) Oral daily  tamsulosin 0.4 milliGRAM(s) Oral at bedtime  atorvastatin 10 milliGRAM(s) Oral at bedtime  amLODIPine   Tablet 10 milliGRAM(s) Oral daily  insulin lispro (HumaLOG) corrective regimen sliding scale   SubCutaneous three times a day before meals  insulin lispro (HumaLOG) corrective regimen sliding scale   SubCutaneous at bedtime  dextrose 5%. 1000 milliLiter(s) (50 mL/Hr) IV Continuous <Continuous>  dextrose 50% Injectable 12.5 Gram(s) IV Push once  dextrose 50% Injectable 25 Gram(s) IV Push once  dextrose 50% Injectable 25 Gram(s) IV Push once  sodium chloride 0.9%. 1000 milliLiter(s) (40 mL/Hr) IV Continuous <Continuous>    MEDICATIONS  (PRN):  dextrose Gel 1 Dose(s) Oral once PRN Blood Glucose LESS THAN 70 milliGRAM(s)/deciliter  glucagon  Injectable 1 milliGRAM(s) IntraMuscular once PRN Glucose LESS THAN 70 milligrams/deciliter  acetaminophen   Tablet. 650 milliGRAM(s) Oral every 6 hours PRN Moderate Pain (4 - 6)      LABS:                        11.2   8.9   )-----------( 168      ( 03 Jul 2017 06:15 )             32.9     07-03    141  |  106  |  12  ----------------------------<  141<H>  3.9   |  19<L>  |  1.30    Ca    9.5      03 Jul 2017 07:19                    Hemoglobin A1C, Whole Blood: 7.8 % (06-25 @ 06:00)        Cultures:    EKG:    Radiological Studies:    Consultant(s) Notes Reviewed:      Care Discussed with Consultants/Other Providers:
SUBJECTIVE / OVERNIGHT EVENTS: No nausea, vomiting or diarrhea, no fever or chills, no dizziness or chest pain, no dysuria or hematuria .    Vital Signs Last 24 Hrs  T(C): 36.9 (06-28-17 @ 04:26), Max: 36.9 (06-28-17 @ 04:26)  HR: 70 (06-28-17 @ 04:26) (70 - 80)  BP: 137/57 (06-28-17 @ 04:26) (120/55 - 137/57)  RR: 18 (06-28-17 @ 04:26) (16 - 18)  SpO2: 98% (06-28-17 @ 04:26) (98% - 98%)  CAPILLARY BLOOD GLUCOSE  154 (28 Jun 2017 07:22)  203 (27 Jun 2017 21:59)  240 (27 Jun 2017 16:41)  234 (27 Jun 2017 11:37)        I&O's Summary    27 Jun 2017 07:01  -  28 Jun 2017 07:00  --------------------------------------------------------  IN: 1320 mL / OUT: 450 mL / NET: 870 mL        PHYSICAL EXAM:  HEAD:  Atraumatic, Normocephalic  EYES: EOMI, PERRLA, conjunctiva and sclera clear  NECK: Supple, No JVD  CHEST/LUNG: Clear to auscultation bilaterally; No wheeze  HEART: Regular rate and rhythm; No murmurs, rubs, or gallops  ABDOMEN: Soft, Nontender, Nondistended; Bowel sounds present  EXTREMITIES:  2+ Peripheral Pulses, No clubbing, cyanosis, or edema  PSYCH: AAOx3  NEUROLOGY: non-focal  SKIN: No rashes or lesions    MEDICATIONS  (STANDING):  pantoprazole  Injectable 40 milliGRAM(s) IV Push two times a day  lisinopril 10 milliGRAM(s) Oral daily  tamsulosin 0.4 milliGRAM(s) Oral at bedtime  atorvastatin 10 milliGRAM(s) Oral at bedtime  amLODIPine   Tablet 10 milliGRAM(s) Oral daily  sodium chloride 0.9%. 1000 milliLiter(s) (40 mL/Hr) IV Continuous <Continuous>  insulin lispro (HumaLOG) corrective regimen sliding scale   SubCutaneous three times a day before meals  insulin lispro (HumaLOG) corrective regimen sliding scale   SubCutaneous at bedtime  dextrose 5%. 1000 milliLiter(s) (50 mL/Hr) IV Continuous <Continuous>  dextrose 50% Injectable 12.5 Gram(s) IV Push once  dextrose 50% Injectable 25 Gram(s) IV Push once  dextrose 50% Injectable 25 Gram(s) IV Push once    MEDICATIONS  (PRN):  dextrose Gel 1 Dose(s) Oral once PRN Blood Glucose LESS THAN 70 milliGRAM(s)/deciliter  glucagon  Injectable 1 milliGRAM(s) IntraMuscular once PRN Glucose LESS THAN 70 milligrams/deciliter  acetaminophen   Tablet. 650 milliGRAM(s) Oral every 6 hours PRN Moderate Pain (4 - 6)      LABS:                        8.1    10.3  )-----------( 143      ( 28 Jun 2017 04:06 )             23.6     06-28    142  |  110<H>  |  22  ----------------------------<  147<H>  4.6   |  19<L>  |  1.49<H>    Ca    8.5      28 Jun 2017 07:35                    Hemoglobin A1C, Whole Blood: 7.8 % (06-25 @ 06:00)        Cultures:    EKG:    Radiological Studies:    Consultant(s) Notes Reviewed:      Care Discussed with Consultants/Other Providers:
SUBJECTIVE / OVERNIGHT EVENTS: No nausea, vomiting or diarrhea, no fever or chills, no dizziness or chest pain, no dysuria or hematuria .    Vital Signs Last 24 Hrs  T(C): 36.9 (07-04-17 @ 04:05), Max: 36.9 (07-04-17 @ 04:05)  HR: 76 (07-04-17 @ 04:05) (70 - 81)  BP: 142/58 (07-04-17 @ 04:05) (135/66 - 142/58)  RR: 18 (07-04-17 @ 04:05) (17 - 18)  SpO2: 99% (07-04-17 @ 04:05) (98% - 99%)  CAPILLARY BLOOD GLUCOSE  155 (04 Jul 2017 07:39)  126 (03 Jul 2017 21:17)  254 (03 Jul 2017 16:41)  152 (03 Jul 2017 13:08)        I&O's Summary    03 Jul 2017 07:01  -  04 Jul 2017 07:00  --------------------------------------------------------  IN: 1320 mL / OUT: 980 mL / NET: 340 mL    04 Jul 2017 07:01  -  04 Jul 2017 08:40  --------------------------------------------------------  IN: 0 mL / OUT: 600 mL / NET: -600 mL        PHYSICAL EXAM:  HEAD:  Atraumatic, Normocephalic  EYES: EOMI, PERRLA, conjunctiva and sclera clear  NECK: Supple, No JVD  CHEST/LUNG: Clear to auscultation bilaterally; No wheeze  HEART: Regular rate and rhythm; No murmurs, rubs, or gallops  ABDOMEN: Soft, Nontender, Nondistended; Bowel sounds present  EXTREMITIES:  2+ Peripheral Pulses, No clubbing, cyanosis, or edema  PSYCH: AAOx3  NEUROLOGY: non-focal  SKIN: No rashes or lesions    MEDICATIONS  (STANDING):  pantoprazole  Injectable 40 milliGRAM(s) IV Push two times a day  lisinopril 10 milliGRAM(s) Oral daily  tamsulosin 0.4 milliGRAM(s) Oral at bedtime  atorvastatin 10 milliGRAM(s) Oral at bedtime  amLODIPine   Tablet 10 milliGRAM(s) Oral daily  insulin lispro (HumaLOG) corrective regimen sliding scale   SubCutaneous three times a day before meals  insulin lispro (HumaLOG) corrective regimen sliding scale   SubCutaneous at bedtime  dextrose 5%. 1000 milliLiter(s) (50 mL/Hr) IV Continuous <Continuous>  dextrose 50% Injectable 12.5 Gram(s) IV Push once  dextrose 50% Injectable 25 Gram(s) IV Push once  dextrose 50% Injectable 25 Gram(s) IV Push once  sodium chloride 0.9%. 1000 milliLiter(s) (40 mL/Hr) IV Continuous <Continuous>    MEDICATIONS  (PRN):  dextrose Gel 1 Dose(s) Oral once PRN Blood Glucose LESS THAN 70 milliGRAM(s)/deciliter  glucagon  Injectable 1 milliGRAM(s) IntraMuscular once PRN Glucose LESS THAN 70 milligrams/deciliter  acetaminophen   Tablet. 650 milliGRAM(s) Oral every 6 hours PRN Moderate Pain (4 - 6)      LABS:                        11.2   8.9   )-----------( 168      ( 03 Jul 2017 06:15 )             32.9     07-03    141  |  106  |  12  ----------------------------<  141<H>  3.9   |  19<L>  |  1.30    Ca    9.5      03 Jul 2017 07:19                    Hemoglobin A1C, Whole Blood: 7.8 % (06-25 @ 06:00)        Cultures:    EKG:    Radiological Studies:    Consultant(s) Notes Reviewed:      Care Discussed with Consultants/Other Providers:
SUBJECTIVE / OVERNIGHT EVENTS: No nausea, vomiting or diarrhea, no fever or chills, no dizziness or chest pain, no dysuria or hematuria .    Vital Signs Last 24 Hrs  T(C): 37.2 (07-02-17 @ 03:58), Max: 37.3 (07-01-17 @ 20:44)  HR: 76 (07-02-17 @ 03:58) (74 - 79)  BP: 118/63 (07-02-17 @ 03:58) (111/54 - 129/66)  RR: 17 (07-02-17 @ 03:58) (17 - 17)  SpO2: 98% (07-02-17 @ 03:58) (97% - 99%)  CAPILLARY BLOOD GLUCOSE  159 (02 Jul 2017 07:31)  186 (01 Jul 2017 20:44)  204 (01 Jul 2017 16:53)  263 (01 Jul 2017 11:41)        I&O's Summary    01 Jul 2017 07:01  -  02 Jul 2017 07:00  --------------------------------------------------------  IN: 2150 mL / OUT: 1300 mL / NET: 850 mL        PHYSICAL EXAM:  HEAD:  Atraumatic, Normocephalic  EYES: EOMI, PERRLA, conjunctiva and sclera clear  NECK: Supple, No JVD  CHEST/LUNG: Clear to auscultation bilaterally; No wheeze  HEART: Regular rate and rhythm; No murmurs, rubs, or gallops  ABDOMEN: Soft, Nontender, Nondistended; Bowel sounds present  EXTREMITIES:  2+ Peripheral Pulses, No clubbing, cyanosis, or edema  PSYCH: AAOx3  NEUROLOGY: non-focal  SKIN: No rashes or lesions    MEDICATIONS  (STANDING):  pantoprazole  Injectable 40 milliGRAM(s) IV Push two times a day  lisinopril 10 milliGRAM(s) Oral daily  tamsulosin 0.4 milliGRAM(s) Oral at bedtime  atorvastatin 10 milliGRAM(s) Oral at bedtime  amLODIPine   Tablet 10 milliGRAM(s) Oral daily  insulin lispro (HumaLOG) corrective regimen sliding scale   SubCutaneous three times a day before meals  insulin lispro (HumaLOG) corrective regimen sliding scale   SubCutaneous at bedtime  dextrose 5%. 1000 milliLiter(s) (50 mL/Hr) IV Continuous <Continuous>  dextrose 50% Injectable 12.5 Gram(s) IV Push once  dextrose 50% Injectable 25 Gram(s) IV Push once  dextrose 50% Injectable 25 Gram(s) IV Push once  sodium chloride 0.9%. 1000 milliLiter(s) (40 mL/Hr) IV Continuous <Continuous>    MEDICATIONS  (PRN):  dextrose Gel 1 Dose(s) Oral once PRN Blood Glucose LESS THAN 70 milliGRAM(s)/deciliter  glucagon  Injectable 1 milliGRAM(s) IntraMuscular once PRN Glucose LESS THAN 70 milligrams/deciliter  acetaminophen   Tablet. 650 milliGRAM(s) Oral every 6 hours PRN Moderate Pain (4 - 6)      LABS:                        7.8    8.98  )-----------( 185      ( 01 Jul 2017 08:42 )             24.4     07-01    141  |  107  |  13  ----------------------------<  156<H>  4.2   |  18<L>  |  1.32<H>    Ca    8.8      01 Jul 2017 08:42                    Hemoglobin A1C, Whole Blood: 7.8 % (06-25 @ 06:00)        Cultures:    EKG:    Radiological Studies:    Consultant(s) Notes Reviewed:      Care Discussed with Consultants/Other Providers:
SUBJECTIVE / OVERNIGHT EVENTS: No nausea, vomiting or diarrhea, no fever or chills, no dizziness or chest pain, no dysuria or hematuria ., no  melena today, no  abd pain    Vital Signs Last 24 Hrs  T(C): 36.6, Max: 36.9 (-24 @ 15:27)  HR: 72 (57 - 73)  BP: 121/58 (108/58 - 141/60)  RR: 18 (16 - 18)  SpO2: 99% (99% - 100%)  Wt(kg): --  CAPILLARY BLOOD GLUCOSE  109 (2017 07:15)  212 (2017 21:10)    I&O's Summary      PHYSICAL EXAM:  HEAD:  Atraumatic, Normocephalic  EYES: EOMI, PERRLA, conjunctiva and sclera clear  NECK: Supple, No JVD  CHEST/LUNG: Clear to auscultation bilaterally; No wheeze  HEART: Regular rate and rhythm; No murmurs, rubs, or gallops  ABDOMEN: Soft, Nontender, Nondistended; Bowel sounds present  EXTREMITIES:  2+ Peripheral Pulses, No clubbing, cyanosis, or edema  PSYCH: AAOx3  NEUROLOGY: non-focal  SKIN: No rashes or lesions    MEDICATIONS  (STANDING):  pantoprazole  Injectable 40milliGRAM(s) IV Push two times a day  lisinopril 10milliGRAM(s) Oral daily  tamsulosin 0.4milliGRAM(s) Oral at bedtime  atorvastatin 10milliGRAM(s) Oral at bedtime  amLODIPine   Tablet 10milliGRAM(s) Oral daily  sodium chloride 0.9%. 1000milliLiter(s) IV Continuous <Continuous>  insulin lispro (HumaLOG) corrective regimen sliding scale  SubCutaneous three times a day before meals  insulin lispro (HumaLOG) corrective regimen sliding scale  SubCutaneous at bedtime  dextrose 5%. 1000milliLiter(s) IV Continuous <Continuous>  dextrose 50% Injectable 12.5Gram(s) IV Push once  dextrose 50% Injectable 25Gram(s) IV Push once  dextrose 50% Injectable 25Gram(s) IV Push once    MEDICATIONS  (PRN):  dextrose Gel 1Dose(s) Oral once PRN Blood Glucose LESS THAN 70 milliGRAM(s)/deciliter  glucagon  Injectable 1milliGRAM(s) IntraMuscular once PRN Glucose LESS THAN 70 milligrams/deciliter      LABS:                        7.6    7.69  )-----------( 186      ( 2017 06:00 )             24.2     06-25    140  |  110<H>  |  28<H>  ----------------------------<  104<H>  4.8   |  19<L>  |  1.46<H>    Ca    9.1      2017 06:00  Phos  3.1     06-24  Mg     1.9         TPro  6.9  /  Alb  3.5  /  TBili  0.3  /  DBili  x   /  AST  28  /  ALT  17  /  AlkPhos  58  06-24    PT/INR - ( 2017 06:00 )   PT: 13.7 sec;   INR: 1.21 ratio         PTT - ( 2017 06:00 )  PTT:28.2 sec  CARDIAC MARKERS ( 2017 10:58 )  x     / <0.01 ng/mL / 125 U/L / x     / 2.3 ng/mL      Urinalysis Basic - ( 2017 14:21 )    Color: Yellow / Appearance: Clear / S.016 / pH: x  Gluc: x / Ketone: Negative  / Bili: Negative / Urobili: Negative mg/dL   Blood: x / Protein: Negative mg/dL / Nitrite: Negative   Leuk Esterase: Negative / RBC: 1 /HPF / WBC 0 /HPF   Sq Epi: x / Non Sq Epi: 1 /HPF / Bacteria: Negative                  Cultures:    EKG:    Radiological Studies:    Consultant(s) Notes Reviewed:      Care Discussed with Consultants/Other Providers:
INTERVAL HPI/OVERNIGHT EVENTS:  HPI:  : 89 year old male with PMHx of aortic stenosis, DM, TAVR,  HTN, HLD, BPH, CAD on baby aspirin and Plavix, presenting with melena since yesterday, 7 episodes total, accompanied by weakness, lightheadedness, and mild dyspnea on exertion. He had a colonoscopy 2 years ago after having BRBPR and was found to have a polyp. Denies abdominal pain, nausea, vomiting, or fever. No other NSAID use or prior PUD. , PMD BASHIR Piper that treated LGIB 2 years ago was Emily  No new overnight event.  No N/V/D.  Tolerating diet.  s/p colonoscopy doing well    MEDICATIONS  (STANDING):  pantoprazole  Injectable 40 milliGRAM(s) IV Push two times a day  lisinopril 10 milliGRAM(s) Oral daily  tamsulosin 0.4 milliGRAM(s) Oral at bedtime  atorvastatin 10 milliGRAM(s) Oral at bedtime  amLODIPine   Tablet 10 milliGRAM(s) Oral daily  insulin lispro (HumaLOG) corrective regimen sliding scale   SubCutaneous three times a day before meals  insulin lispro (HumaLOG) corrective regimen sliding scale   SubCutaneous at bedtime  dextrose 5%. 1000 milliLiter(s) (50 mL/Hr) IV Continuous <Continuous>  dextrose 50% Injectable 12.5 Gram(s) IV Push once  dextrose 50% Injectable 25 Gram(s) IV Push once  dextrose 50% Injectable 25 Gram(s) IV Push once  sodium chloride 0.9%. 1000 milliLiter(s) (40 mL/Hr) IV Continuous <Continuous>  polyethylene glycol/electrolyte Solution 1 Liter(s) Oral every 4 hours  bisacodyl 10 milliGRAM(s) Oral every 4 hours    MEDICATIONS  (PRN):  dextrose Gel 1 Dose(s) Oral once PRN Blood Glucose LESS THAN 70 milliGRAM(s)/deciliter  glucagon  Injectable 1 milliGRAM(s) IntraMuscular once PRN Glucose LESS THAN 70 milligrams/deciliter  acetaminophen   Tablet. 650 milliGRAM(s) Oral every 6 hours PRN Moderate Pain (4 - 6)      Allergies    Cipro (Pruritus; Rash)    Intolerances          General:  No wt loss, fevers, chills, night sweats, fatigue,   Eyes:  Good vision, no reported pain  ENT:  No sore throat, pain, runny nose, dysphagia  CV:  No pain, palpitations, hypo/hypertension  Resp:  No dyspnea, cough, tachypnea, wheezing  GI:  No pain, No nausea, No vomiting, No diarrhea, No constipation, No weight loss, No fever, No pruritis, No rectal bleeding, No tarry stools, No dysphagia,  :  No pain, bleeding, incontinence, nocturia  Muscle:  No pain, weakness  Neuro:  No weakness, tingling, memory problems  Psych:  No fatigue, insomnia, mood problems, depression  Endocrine:  No polyuria, polydipsia, cold/heat intolerance  Heme:  No petechiae, ecchymosis, easy bruisability  Skin:  No rash, tattoos, scars, edema      PHYSICAL EXAM:   Vital Signs:  Vital Signs Last 24 Hrs  T(C): 37.2 (2017 03:58), Max: 37.3 (2017 20:44)  T(F): 98.9 (2017 03:58), Max: 99.1 (2017 20:44)  HR: 76 (2017 03:58) (74 - 79)  BP: 118/63 (2017 03:58) (111/54 - 129/66)  BP(mean): --  RR: 17 (2017 03:58) (17 - 17)  SpO2: 98% (2017 03:58) (97% - 99%)  Daily     Daily Weight in k.4 (2017 07:31)I&O's Summary    2017 07:01  -  2017 07:00  --------------------------------------------------------  IN: 2150 mL / OUT: 1300 mL / NET: 850 mL        GENERAL:  Appears stated age, well-groomed, well-nourished, no distress  HEENT:  NC/AT,  conjunctivae clear and pink, no thyromegaly, nodules, adenopathy, no JVD, sclera -anicteric  CHEST:  Full & symmetric excursion, no increased effort, breath sounds clear  HEART:  Regular rhythm, S1, S2, no murmur/rub/S3/S4, no abdominal bruit, no edema  ABDOMEN:  Soft, non-tender, non-distended, normoactive bowel sounds,  no masses ,no hepato-splenomegaly, no signs of chronic liver disease  EXTEREMITIES:  no cyanosis,clubbing or edema  SKIN:  No rash/erythema/ecchymoses/petechiae/wounds/abscess/warm/dry  NEURO:  Alert, oriented, no asterixis, no tremor, no encephalopathy      LABS:                        7.8    8.98  )-----------( 185      ( 2017 08:42 )             24.4     07-    141  |  107  |  13  ----------------------------<  156<H>  4.2   |  18<L>  |  1.32<H>    Ca    8.8      2017 08:42          amylase   lipase  RADIOLOGY & ADDITIONAL TESTS:

## 2017-07-04 NOTE — PROGRESS NOTE ADULT - PROBLEM SELECTOR PLAN 1
- Cardiology following
- Cardiology following -- clearance for egd noted  - Plan for echo per cards

## 2017-07-04 NOTE — PROGRESS NOTE ADULT - PROBLEM SELECTOR PROBLEM 1
Aortic valve stenosis, unspecified etiology

## 2017-07-04 NOTE — PROGRESS NOTE ADULT - PROBLEM SELECTOR PLAN 2
- S/P EGD w/ gastritis, biopsied -- negative for h. pylori  - H/H and cbc monitoring - transfuse prn   - Repeat FOBT Positive  - Continue ppi 40 mg iv BID  - Diet as tolerated   - Capsule study --- + for colonic bleed   - s/p colonoscopy which showed diverticulosis no active bleed and brown stool  - no objection to dc

## 2017-08-15 ENCOUNTER — APPOINTMENT (OUTPATIENT)
Dept: CV DIAGNOSITCS | Facility: HOSPITAL | Age: 82
End: 2017-08-15

## 2017-08-15 ENCOUNTER — APPOINTMENT (OUTPATIENT)
Dept: CARDIOLOGY | Facility: CLINIC | Age: 82
End: 2017-08-15

## 2017-09-12 ENCOUNTER — OUTPATIENT (OUTPATIENT)
Dept: OUTPATIENT SERVICES | Facility: HOSPITAL | Age: 82
LOS: 1 days | End: 2017-09-12
Payer: COMMERCIAL

## 2017-09-12 ENCOUNTER — APPOINTMENT (OUTPATIENT)
Dept: CV DIAGNOSITCS | Facility: HOSPITAL | Age: 82
End: 2017-09-12

## 2017-09-12 ENCOUNTER — APPOINTMENT (OUTPATIENT)
Dept: CARDIOLOGY | Facility: CLINIC | Age: 82
End: 2017-09-12
Payer: MEDICARE

## 2017-09-12 VITALS — DIASTOLIC BLOOD PRESSURE: 70 MMHG | HEART RATE: 82 BPM | SYSTOLIC BLOOD PRESSURE: 172 MMHG | OXYGEN SATURATION: 99 %

## 2017-09-12 DIAGNOSIS — I35.0 NONRHEUMATIC AORTIC (VALVE) STENOSIS: ICD-10-CM

## 2017-09-12 PROCEDURE — 93306 TTE W/DOPPLER COMPLETE: CPT | Mod: 26

## 2017-09-12 PROCEDURE — 93306 TTE W/DOPPLER COMPLETE: CPT

## 2017-09-12 PROCEDURE — 99215 OFFICE O/P EST HI 40 MIN: CPT

## 2017-09-12 PROCEDURE — 93000 ELECTROCARDIOGRAM COMPLETE: CPT

## 2017-09-12 RX ORDER — PANTOPRAZOLE 40 MG/1
40 TABLET, DELAYED RELEASE ORAL
Qty: 90 | Refills: 0 | Status: ACTIVE | COMMUNITY
Start: 2017-09-12 | End: 1900-01-01

## 2017-10-10 ENCOUNTER — NON-APPOINTMENT (OUTPATIENT)
Age: 82
End: 2017-10-10

## 2017-10-10 ENCOUNTER — APPOINTMENT (OUTPATIENT)
Dept: CARDIOLOGY | Facility: CLINIC | Age: 82
End: 2017-10-10
Payer: MEDICARE

## 2017-10-10 VITALS
WEIGHT: 171 LBS | HEIGHT: 66 IN | SYSTOLIC BLOOD PRESSURE: 205 MMHG | DIASTOLIC BLOOD PRESSURE: 81 MMHG | OXYGEN SATURATION: 98 % | BODY MASS INDEX: 27.48 KG/M2 | HEART RATE: 70 BPM

## 2017-10-10 DIAGNOSIS — I44.7 LEFT BUNDLE-BRANCH BLOCK, UNSPECIFIED: ICD-10-CM

## 2017-10-10 DIAGNOSIS — I42.2 OTHER HYPERTROPHIC CARDIOMYOPATHY: ICD-10-CM

## 2017-10-10 DIAGNOSIS — E78.5 HYPERLIPIDEMIA, UNSPECIFIED: ICD-10-CM

## 2017-10-10 DIAGNOSIS — I35.0 NONRHEUMATIC AORTIC (VALVE) STENOSIS: ICD-10-CM

## 2017-10-10 DIAGNOSIS — I73.9 PERIPHERAL VASCULAR DISEASE, UNSPECIFIED: ICD-10-CM

## 2017-10-10 DIAGNOSIS — I10 ESSENTIAL (PRIMARY) HYPERTENSION: ICD-10-CM

## 2017-10-10 PROCEDURE — 93000 ELECTROCARDIOGRAM COMPLETE: CPT

## 2017-10-10 PROCEDURE — 99215 OFFICE O/P EST HI 40 MIN: CPT

## 2017-10-10 RX ORDER — VERAPAMIL HYDROCHLORIDE 120 MG/1
120 CAPSULE, EXTENDED RELEASE ORAL DAILY
Qty: 30 | Refills: 11 | Status: ACTIVE | COMMUNITY
Start: 2017-10-10 | End: 1900-01-01

## 2017-12-04 PROBLEM — I42.2 HYPERTROPHIC CARDIOMYOPATHY: Status: ACTIVE | Noted: 2017-10-10

## 2018-01-01 NOTE — PATIENT PROFILE ADULT. - LIVING ARRANGEMENTS, TEMPORARY FAMILY, PROFILE
none required Baby boy born at 37 + 1/7 weeks gestation, BW: 2015 grams by  to a B+, 33 y.o primip with negative serologies, positive GBBS. Mother treated with penicillin times 2 ptd. ARM clear 2 hours ptd. APGARS: 9 and 9. Admitted NICU secondary to low birth weight. Stable in room air.  IV started D10W at 70cc/kg/day. Breast feeds/EBM initiated and IV weaned. Chemstrips stable. Baby boy born at 37 + 1/7 weeks gestation, BW: 2015 grams by  to a B+, 33 y.o primip with negative serologies, positive GBBS. Mother treated with penicillin times 2 ptd. ARM clear 2 hours ptd. APGARS: 9 and 9. Admitted NICU secondary to low birth weight. Stable in room air.  IV started D10W at 70cc/kg/day. Breast feeds/EBM initiated and IV discontinued after 10 hours. Chemstrips stable. Maintained on feeds: Breast/EBM/Neosure Q3  Phototherapy: 3/14 - Baby boy born at 37 + 1/7 weeks gestation, BW: 2015 grams by  to a B+, 33 y.o primip with negative serologies, positive GBBS. Mother treated with penicillin times 2 ptd. ARM clear 2 hours ptd. APGARS: 9 and 9. Admitted NICU secondary to low birth weight. Stable in room air.  IV started D10W at 70cc/kg/day. Breast feeds/EBM initiated and IV discontinued after 10 hours. Chemstrips stable. Maintained on feeds: Breast/EBM/Neosure Q3  Phototherapy: 3/14 - 3/15 and 3/17- Baby boy born at 37 + 1/7 weeks gestation, BW: 2015 grams by  to a B+, 33 y.o primip with negative serologies, positive GBS. Mother treated with 2 doses of penicillin g PTD. AROM clear 2 hours PTD. APGARS: 9 and 9. Admitted NICU secondary to low birth weight.   Stable in room air.  IV started D10W at 70cc/kg/day. Breast feeds/EBM initiated and IV discontinued after 10 hours. Chemstrips stable. Maintained on feeds: Breast/EBM/Neosure PO ad asad Q3  Phototherapy: 3/14 - 3/15 and 3/17 - 3/18 HILARIA Crenshaw is an ex 37 1/7 week  born by  to a 33 year-old  mother with negative serologies and positive GBS.  Maternal CMV and Toxoplasmosis IgM negative.  Mother received 2 doses of Penicillin prior to delivery.  AROM clear 2 hours prior to delivery.  APGARs 9 and 9 at 1 and 5 minutes respectively.  Infant admitted to NICU due to SGA (birth weight 2015g).      Stable breathing room air throughout admission.    NPO on admission and maintained on IV fluids.  Feeds started 3/14 and IV fluids discontinued.  Euglycemic throughout admission.  Currently breastfeeding with EBM/Neosure supplementation q3h.      Mother is B+.  Phototherapy: 3/14 - 3/15 and 3/17 - 3/18.

## 2018-06-16 ENCOUNTER — INPATIENT (INPATIENT)
Facility: HOSPITAL | Age: 83
LOS: 6 days | Discharge: HOME | DRG: 871 | End: 2018-06-23
Attending: INTERNAL MEDICINE | Admitting: INTERNAL MEDICINE
Payer: COMMERCIAL

## 2018-06-16 VITALS
DIASTOLIC BLOOD PRESSURE: 72 MMHG | OXYGEN SATURATION: 95 % | HEART RATE: 80 BPM | TEMPERATURE: 99 F | WEIGHT: 167.99 LBS | HEIGHT: 66 IN | SYSTOLIC BLOOD PRESSURE: 153 MMHG | RESPIRATION RATE: 18 BRPM

## 2018-06-16 DIAGNOSIS — J18.9 PNEUMONIA, UNSPECIFIED ORGANISM: ICD-10-CM

## 2018-06-16 DIAGNOSIS — Z95.2 PRESENCE OF PROSTHETIC HEART VALVE: Chronic | ICD-10-CM

## 2018-06-16 LAB
ALBUMIN SERPL ELPH-MCNC: 3.1 G/DL — LOW (ref 3.3–5)
ALP SERPL-CCNC: 112 U/L — SIGNIFICANT CHANGE UP (ref 40–120)
ALT FLD-CCNC: 69 U/L — HIGH (ref 10–45)
ANION GAP SERPL CALC-SCNC: 19 MMOL/L — HIGH (ref 5–17)
APTT BLD: 28.8 SEC — SIGNIFICANT CHANGE UP (ref 27.5–37.4)
AST SERPL-CCNC: 80 U/L — HIGH (ref 10–40)
BASOPHILS # BLD AUTO: 0 K/UL — SIGNIFICANT CHANGE UP (ref 0–0.2)
BASOPHILS NFR BLD AUTO: 0.1 % — SIGNIFICANT CHANGE UP (ref 0–2)
BILIRUB SERPL-MCNC: 0.6 MG/DL — SIGNIFICANT CHANGE UP (ref 0.2–1.2)
BLD GP AB SCN SERPL QL: NEGATIVE — SIGNIFICANT CHANGE UP
BUN SERPL-MCNC: 29 MG/DL — HIGH (ref 7–23)
CALCIUM SERPL-MCNC: 8.8 MG/DL — SIGNIFICANT CHANGE UP (ref 8.4–10.5)
CHLORIDE SERPL-SCNC: 98 MMOL/L — SIGNIFICANT CHANGE UP (ref 96–108)
CO2 SERPL-SCNC: 16 MMOL/L — LOW (ref 22–31)
CREAT SERPL-MCNC: 1.75 MG/DL — HIGH (ref 0.5–1.3)
EOSINOPHIL # BLD AUTO: 0 K/UL — SIGNIFICANT CHANGE UP (ref 0–0.5)
EOSINOPHIL NFR BLD AUTO: 0.2 % — SIGNIFICANT CHANGE UP (ref 0–6)
GAS PNL BLDV: SIGNIFICANT CHANGE UP
GLUCOSE BLDC GLUCOMTR-MCNC: 247 MG/DL — HIGH (ref 70–99)
GLUCOSE BLDC GLUCOMTR-MCNC: 277 MG/DL — HIGH (ref 70–99)
GLUCOSE SERPL-MCNC: 326 MG/DL — HIGH (ref 70–99)
HCT VFR BLD CALC: 35.3 % — LOW (ref 39–50)
HGB BLD-MCNC: 11.8 G/DL — LOW (ref 13–17)
INR BLD: 1.67 RATIO — HIGH (ref 0.88–1.16)
LYMPHOCYTES # BLD AUTO: 0.6 K/UL — LOW (ref 1–3.3)
LYMPHOCYTES # BLD AUTO: 5 % — LOW (ref 13–44)
MCHC RBC-ENTMCNC: 29.2 PG — SIGNIFICANT CHANGE UP (ref 27–34)
MCHC RBC-ENTMCNC: 33.5 GM/DL — SIGNIFICANT CHANGE UP (ref 32–36)
MCV RBC AUTO: 87 FL — SIGNIFICANT CHANGE UP (ref 80–100)
MONOCYTES # BLD AUTO: 1 K/UL — HIGH (ref 0–0.9)
MONOCYTES NFR BLD AUTO: 8.8 % — SIGNIFICANT CHANGE UP (ref 2–14)
NEUTROPHILS # BLD AUTO: 9.7 K/UL — HIGH (ref 1.8–7.4)
NEUTROPHILS NFR BLD AUTO: 85.9 % — HIGH (ref 43–77)
PLATELET # BLD AUTO: 186 K/UL — SIGNIFICANT CHANGE UP (ref 150–400)
POTASSIUM SERPL-MCNC: 3.6 MMOL/L — SIGNIFICANT CHANGE UP (ref 3.5–5.3)
POTASSIUM SERPL-SCNC: 3.6 MMOL/L — SIGNIFICANT CHANGE UP (ref 3.5–5.3)
PROT SERPL-MCNC: 7.9 G/DL — SIGNIFICANT CHANGE UP (ref 6–8.3)
PROTHROM AB SERPL-ACNC: 18.4 SEC — HIGH (ref 9.8–12.7)
RBC # BLD: 4.06 M/UL — LOW (ref 4.2–5.8)
RBC # FLD: 13.9 % — SIGNIFICANT CHANGE UP (ref 10.3–14.5)
RH IG SCN BLD-IMP: POSITIVE — SIGNIFICANT CHANGE UP
SODIUM SERPL-SCNC: 133 MMOL/L — LOW (ref 135–145)
WBC # BLD: 11.3 K/UL — HIGH (ref 3.8–10.5)
WBC # FLD AUTO: 11.3 K/UL — HIGH (ref 3.8–10.5)

## 2018-06-16 PROCEDURE — 71250 CT THORAX DX C-: CPT | Mod: 26

## 2018-06-16 PROCEDURE — 71045 X-RAY EXAM CHEST 1 VIEW: CPT | Mod: 26

## 2018-06-16 PROCEDURE — 99285 EMERGENCY DEPT VISIT HI MDM: CPT | Mod: 25

## 2018-06-16 PROCEDURE — 93010 ELECTROCARDIOGRAM REPORT: CPT

## 2018-06-16 RX ORDER — IPRATROPIUM/ALBUTEROL SULFATE 18-103MCG
3 AEROSOL WITH ADAPTER (GRAM) INHALATION ONCE
Qty: 0 | Refills: 0 | Status: COMPLETED | OUTPATIENT
Start: 2018-06-16 | End: 2018-06-16

## 2018-06-16 RX ORDER — DEXTROSE 50 % IN WATER 50 %
25 SYRINGE (ML) INTRAVENOUS ONCE
Qty: 0 | Refills: 0 | Status: DISCONTINUED | OUTPATIENT
Start: 2018-06-16 | End: 2018-06-23

## 2018-06-16 RX ORDER — AZITHROMYCIN 500 MG/1
500 TABLET, FILM COATED ORAL EVERY 24 HOURS
Qty: 0 | Refills: 0 | Status: DISCONTINUED | OUTPATIENT
Start: 2018-06-16 | End: 2018-06-23

## 2018-06-16 RX ORDER — INSULIN LISPRO 100/ML
VIAL (ML) SUBCUTANEOUS AT BEDTIME
Qty: 0 | Refills: 0 | Status: DISCONTINUED | OUTPATIENT
Start: 2018-06-16 | End: 2018-06-23

## 2018-06-16 RX ORDER — DEXTROSE 50 % IN WATER 50 %
15 SYRINGE (ML) INTRAVENOUS ONCE
Qty: 0 | Refills: 0 | Status: DISCONTINUED | OUTPATIENT
Start: 2018-06-16 | End: 2018-06-23

## 2018-06-16 RX ORDER — ACETAMINOPHEN 500 MG
650 TABLET ORAL ONCE
Qty: 0 | Refills: 0 | Status: COMPLETED | OUTPATIENT
Start: 2018-06-16 | End: 2018-06-16

## 2018-06-16 RX ORDER — INSULIN LISPRO 100/ML
8 VIAL (ML) SUBCUTANEOUS ONCE
Qty: 0 | Refills: 0 | Status: COMPLETED | OUTPATIENT
Start: 2018-06-16 | End: 2018-06-16

## 2018-06-16 RX ORDER — PANTOPRAZOLE SODIUM 20 MG/1
40 TABLET, DELAYED RELEASE ORAL
Qty: 0 | Refills: 0 | Status: DISCONTINUED | OUTPATIENT
Start: 2018-06-16 | End: 2018-06-23

## 2018-06-16 RX ORDER — DEXTROSE 50 % IN WATER 50 %
12.5 SYRINGE (ML) INTRAVENOUS ONCE
Qty: 0 | Refills: 0 | Status: DISCONTINUED | OUTPATIENT
Start: 2018-06-16 | End: 2018-06-23

## 2018-06-16 RX ORDER — AMLODIPINE BESYLATE 2.5 MG/1
10 TABLET ORAL DAILY
Qty: 0 | Refills: 0 | Status: DISCONTINUED | OUTPATIENT
Start: 2018-06-16 | End: 2018-06-23

## 2018-06-16 RX ORDER — CEFTRIAXONE 500 MG/1
1 INJECTION, POWDER, FOR SOLUTION INTRAMUSCULAR; INTRAVENOUS EVERY 24 HOURS
Qty: 0 | Refills: 0 | Status: DISCONTINUED | OUTPATIENT
Start: 2018-06-16 | End: 2018-06-19

## 2018-06-16 RX ORDER — ACETAMINOPHEN 500 MG
650 TABLET ORAL ONCE
Qty: 0 | Refills: 0 | Status: DISCONTINUED | OUTPATIENT
Start: 2018-06-16 | End: 2018-06-16

## 2018-06-16 RX ORDER — SODIUM CHLORIDE 9 MG/ML
1000 INJECTION, SOLUTION INTRAVENOUS
Qty: 0 | Refills: 0 | Status: DISCONTINUED | OUTPATIENT
Start: 2018-06-16 | End: 2018-06-23

## 2018-06-16 RX ORDER — CEFTRIAXONE 500 MG/1
1 INJECTION, POWDER, FOR SOLUTION INTRAMUSCULAR; INTRAVENOUS ONCE
Qty: 0 | Refills: 0 | Status: COMPLETED | OUTPATIENT
Start: 2018-06-16 | End: 2018-06-16

## 2018-06-16 RX ORDER — TAMSULOSIN HYDROCHLORIDE 0.4 MG/1
0.4 CAPSULE ORAL AT BEDTIME
Qty: 0 | Refills: 0 | Status: DISCONTINUED | OUTPATIENT
Start: 2018-06-16 | End: 2018-06-23

## 2018-06-16 RX ORDER — LISINOPRIL 2.5 MG/1
10 TABLET ORAL DAILY
Qty: 0 | Refills: 0 | Status: DISCONTINUED | OUTPATIENT
Start: 2018-06-16 | End: 2018-06-23

## 2018-06-16 RX ORDER — SODIUM CHLORIDE 9 MG/ML
1000 INJECTION INTRAMUSCULAR; INTRAVENOUS; SUBCUTANEOUS ONCE
Qty: 0 | Refills: 0 | Status: COMPLETED | OUTPATIENT
Start: 2018-06-16 | End: 2018-06-16

## 2018-06-16 RX ORDER — ATORVASTATIN CALCIUM 80 MG/1
10 TABLET, FILM COATED ORAL AT BEDTIME
Qty: 0 | Refills: 0 | Status: DISCONTINUED | OUTPATIENT
Start: 2018-06-16 | End: 2018-06-18

## 2018-06-16 RX ORDER — GLUCAGON INJECTION, SOLUTION 0.5 MG/.1ML
1 INJECTION, SOLUTION SUBCUTANEOUS ONCE
Qty: 0 | Refills: 0 | Status: DISCONTINUED | OUTPATIENT
Start: 2018-06-16 | End: 2018-06-23

## 2018-06-16 RX ORDER — AZITHROMYCIN 500 MG/1
500 TABLET, FILM COATED ORAL ONCE
Qty: 0 | Refills: 0 | Status: COMPLETED | OUTPATIENT
Start: 2018-06-16 | End: 2018-06-16

## 2018-06-16 RX ORDER — INSULIN LISPRO 100/ML
VIAL (ML) SUBCUTANEOUS
Qty: 0 | Refills: 0 | Status: DISCONTINUED | OUTPATIENT
Start: 2018-06-16 | End: 2018-06-23

## 2018-06-16 RX ORDER — SODIUM CHLORIDE 9 MG/ML
1000 INJECTION INTRAMUSCULAR; INTRAVENOUS; SUBCUTANEOUS
Qty: 0 | Refills: 0 | Status: DISCONTINUED | OUTPATIENT
Start: 2018-06-16 | End: 2018-06-17

## 2018-06-16 RX ADMIN — TAMSULOSIN HYDROCHLORIDE 0.4 MILLIGRAM(S): 0.4 CAPSULE ORAL at 21:26

## 2018-06-16 RX ADMIN — AZITHROMYCIN 250 MILLIGRAM(S): 500 TABLET, FILM COATED ORAL at 14:14

## 2018-06-16 RX ADMIN — LISINOPRIL 10 MILLIGRAM(S): 2.5 TABLET ORAL at 18:44

## 2018-06-16 RX ADMIN — CEFTRIAXONE 100 GRAM(S): 500 INJECTION, POWDER, FOR SOLUTION INTRAMUSCULAR; INTRAVENOUS at 12:01

## 2018-06-16 RX ADMIN — PANTOPRAZOLE SODIUM 40 MILLIGRAM(S): 20 TABLET, DELAYED RELEASE ORAL at 18:44

## 2018-06-16 RX ADMIN — Medication 3 MILLILITER(S): at 11:59

## 2018-06-16 RX ADMIN — Medication 3 MILLILITER(S): at 11:57

## 2018-06-16 RX ADMIN — ATORVASTATIN CALCIUM 10 MILLIGRAM(S): 80 TABLET, FILM COATED ORAL at 21:26

## 2018-06-16 RX ADMIN — SODIUM CHLORIDE 1000 MILLILITER(S): 9 INJECTION INTRAMUSCULAR; INTRAVENOUS; SUBCUTANEOUS at 12:01

## 2018-06-16 RX ADMIN — Medication 3: at 17:18

## 2018-06-16 RX ADMIN — Medication 3 MILLILITER(S): at 12:01

## 2018-06-16 RX ADMIN — AMLODIPINE BESYLATE 10 MILLIGRAM(S): 2.5 TABLET ORAL at 18:44

## 2018-06-16 RX ADMIN — Medication 8 UNIT(S): at 14:20

## 2018-06-16 RX ADMIN — Medication 650 MILLIGRAM(S): at 14:52

## 2018-06-16 RX ADMIN — CEFTRIAXONE 100 GRAM(S): 500 INJECTION, POWDER, FOR SOLUTION INTRAMUSCULAR; INTRAVENOUS at 18:45

## 2018-06-16 NOTE — H&P ADULT - HISTORY OF PRESENT ILLNESS
89 y/o M with h/o HTN, chol, LBBB, DM2, BPH, PVD, AS s/p valve replacement July 2015 recent 6 month vacation outside the country to [place] and [place] p/w cough with hemoptysis, fever, chills, and night sweats x3 days. Pt was feeling fine previously, began having a cough and some "sniffles" 4 days ago after gardening all day, 3 days ago began having a dry non-productive cough that progressed to a cough with hemoptysis described as bright red blood tinged sputum. Pt reports breathing quickly, +SOB. +myalgias/arthralgias 2d ago resolved with [place]. No advil or tylenol since Thursday. Denies massive hemoptysis. Pt had no known TB exposure, no known prior h/o TB, no known prior PPD. 2 pack year remote smoking hx, quit 60 years ago. Pt denies prior episodes, recent/recurrent lung infections, respiratory dz, known h/o CA, wt loss, numbness, paresthesias, weakness, altered mental status, syncope, change in vision, chest pain, back pain, abdominal pain, n/v/d, recent leg pain or swelling, or sick contacts.

## 2018-06-16 NOTE — H&P ADULT - NEGATIVE OPHTHALMOLOGIC SYMPTOMS
no photophobia/no lacrimation L/no lacrimation R/no blurred vision L/no diplopia/no blurred vision R

## 2018-06-16 NOTE — H&P ADULT - NSHPLABSRESULTS_GEN_ALL_CORE
LABS:                        11.8   11.3  )-----------( 186      ( 16 Jun 2018 11:31 )             35.3     06-16    133<L>  |  98  |  29<H>  ----------------------------<  326<H>  3.6   |  16<L>  |  1.75<H>    Ca    8.8      16 Jun 2018 11:31    TPro  7.9  /  Alb  3.1<L>  /  TBili  0.6  /  DBili  x   /  AST  80<H>  /  ALT  69<H>  /  AlkPhos  112  06-16    PT/INR - ( 16 Jun 2018 11:31 )   PT: 18.4 sec;   INR: 1.67 ratio         PTT - ( 16 Jun 2018 11:31 )  PTT:28.8 sec          RADIOLOGY & ADDITIONAL TESTS:

## 2018-06-16 NOTE — H&P ADULT - ASSESSMENT
89 y/o M with h/o HTN, chol, LBBB, DM2, BPH, PVD, AS s/p valve replacement July 2015 recent 6 month vacation outside the country to [place] and [place] p/w cough with hemoptysis, fever, chills, and night sweats x3 days. Pt was feeling fine previously, began having a cough and some "sniffles" 4 days ago after gardening all day, 3 days ago began having a dry non-productive cough that progressed to a cough with hemoptysis described as bright red blood tinged sputum. Pt reports breathing quickly, +SOB. +myalgias/arthralgias 2d ago resolved with Bengay. 89 y/o M with h/o HTN, chol, LBBB, DM2, BPH, PVD, AS s/p valve replacement July 2015 recent 6 month vacation outside the country to [place] and [place] p/w cough with hemoptysis, fever, chills, and night sweats x3 days. Pt was feeling fine previously, began having a cough and some "sniffles" 4 days ago after gardening all day, 3 days ago began having a dry non-productive cough that progressed to a cough with hemoptysis described as bright red blood tinged sputum. Pt reports breathing quickly, +SOB. +myalgias/arthralgias 2d ago resolved with Bengay.     cap  - ceftriaxone  - azithro  - blood cultures    htn  - cw home meds    diabetes  - insulin sliding scale 89 y/o M with h/o HTN, chol, LBBB, DM2, BPH, PVD, AS s/p valve replacement July 2015 recent 6 month vacation outside the country to [place] and [place] p/w cough with hemoptysis, fever, chills, and night sweats x3 days. Pt was feeling fine previously, began having a cough and some "sniffles" 4 days ago after gardening all day, 3 days ago began having a dry non-productive cough that progressed to a cough with hemoptysis described as bright red blood tinged sputum. Pt reports breathing quickly, +SOB. +myalgias/arthralgias 2d ago resolved with Bengay.     cap  - ceftriaxone  - azithro  - blood cultures    bhavya  - iv fluids    htn  - cw home meds    diabetes  - insulin sliding scale

## 2018-06-16 NOTE — ED PROVIDER NOTE - PHYSICAL EXAMINATION
Pt in NAD, A&O x3. CN 2-12 grossly intact. Head NCAT, sclera white without injection, PERRLA, EOMI. Nares patent, turbinates without edema or erythema, no polyps or septal deviation. No auricular tenderness, TMs pearly grey. Mouth and pharynx without erythema or exudates, uvula midline, no tonsillar enlargement. Trachea midline, no lymphadenopathy, no obvious JVD. No retractions or chest wall deformities. No chest wall tenderness, auscultation limited 2/2 poor inspiratory effort, tachypneic, CTA anterior and posterior b/l, no wheezes, rales, or rhonchi. RRR, clear distinct loud S1, nl S2, +systolic murmur c/w artificial valve no S3, S4, gallops, or rubs. Abdomen without obvious deformities, lesions, or pulsations. NABS all 4 quadrants, abdomen soft, non-tender, no organomegaly or masses. No CVAT b/l. 2+ carotid, radial, ulnar, dorsalis pedis, and posterior tibial pulses. No edema or tenderness of the lower extremities. Pt in NAD, A&O x3. CN 2-12 grossly intact. Head NCAT, sclera white without injection, PERRLA, EOMI. Nares patent, turbinates without edema or erythema, no polyps or septal deviation. No auricular tenderness, TMs pearly grey. Mouth and pharynx without erythema or exudates, uvula midline, no tonsillar enlargement. Trachea midline, no lymphadenopathy, no obvious JVD. No retractions or chest wall deformities. No chest wall tenderness, auscultation limited 2/2 poor inspiratory effort, tachypneic,  rales both lung CTA anterior and posterior b/l, no wheezes, rales, or rhonchi. RRR, clear distinct loud S1, nl S2, +systolic murmur c/w artificial valve no S3, S4, gallops, or rubs. Abdomen without obvious deformities, lesions, or pulsations. NABS all 4 quadrants, abdomen soft, non-tender, no organomegaly or masses. No CVAT b/l. 2+ carotid, radial, ulnar, dorsalis pedis, and posterior tibial pulses. No edema or tenderness of the lower extremities.

## 2018-06-16 NOTE — ED ADULT NURSE NOTE - OBJECTIVE STATEMENT
89 yr old male with multiple medical history present to the ER for coughing up blood x2 days associated with fever, chills, shortness of breath and weakness. As per pt's report he was gardening outside of Tuesday and after that he started to experience nasal congestion. Pt report on Wednesday he started to experience fever and chills and took both Dayquil and Nyquil which provided some relief. Pt's grandson reported that he started to notice blood in his sputum on Thursday evening which did not stopped. Pt's grandson report that pt requires help to ambulate because he has being complaining at home about weakness in the knees. Pt reported that he traveled to both [facility] and [place] and returned [place] the [place] June [place],2018.

## 2018-06-16 NOTE — ED PROVIDER NOTE - MEDICAL DECISION MAKING DETAILS
89 y/o M with h/o HTN, chol, LBBB, DM2, BPH, PVD, AS s/p valve replacement July 2015 recent 6 month vacation outside the country to [place] and [place] c/o cough, high fever, SOB, and small amount of blood in sputum for 3 days. Pt is dyspneic, heart: systolic murmur of the aorta, no pedal edema, r/o pneumonia, will obtain blood work, IV hydration, urine legionella, IV abx, and admission.ZR 89 y/o M with h/o HTN, chol, LBBB, DM2, BPH, PVD, AS s/p valve replacement July 2015 recent 6 month vacation outside the country to [place] and [place] c/o cough, high fever, SOB, and small amount of blood in sputum for 3 days. Pt is dyspneic, heart:  bilateral rales  systolic murmur of the aorta, no pedal edema, r/o pneumonia, will obtain blood work, IV hydration, urine legionella, IV abx, and admission.ZR

## 2018-06-16 NOTE — ED PROVIDER NOTE - OBJECTIVE STATEMENT
89 y/o M with h/o HTN, chol, LBBB, DM2, BPH, PVD, AS s/p valve replacement July 2015 recent 6 month vacation outside the country to [place] and [place] p/w cough with hemoptysis, fever, chills, and night sweats x3 days. Pt was feeling fine previously, began having a cough and some "sniffles" 4 days ago, 3 days ago began having a dry non-productive cough that progressed to a cough with hemoptysis described as bright red blood tinged sputum. Pt reports breathing quickly, +SOB. +myalgias/arthralgias 2d ago resolved with Bengay. No advil or tylenol since Thursday. Denies massive hemoptysis. Pt had no known TB exposure, no known prior h/o TB, no known prior PPD. 2 pack year remote smoking hx, quit 60 years ago. Pt denies prior episodes, recent/recurrent lung infections, respiratory dz, known h/o CA, numbness, paresthesias, weakness, altered mental status, syncope, change in vision, chest pain, back pain, abdominal pain, n/v/d, recent leg pain or swelling, or sick contacts. 89 y/o M with h/o HTN, chol, LBBB, DM2, BPH, PVD, AS s/p valve replacement July 2015 recent 6 month vacation outside the country to [place] and [place] p/w cough with hemoptysis, fever, chills, and night sweats x3 days. Pt was feeling fine previously, began having a cough and some "sniffles" 4 days ago after gardening all day, 3 days ago began having a dry non-productive cough that progressed to a cough with hemoptysis described as bright red blood tinged sputum. Pt reports breathing quickly, +SOB. +myalgias/arthralgias 2d ago resolved with Bengay.   No advil or tylenol since Thursday. Denies massive hemoptysis. Pt had no known TB exposure, no known prior h/o TB, no known prior PPD. 2 pack year remote smoking hx, quit 60 years ago. Pt denies prior episodes, recent/recurrent lung infections, respiratory dz, known h/o CA, wt loss, numbness, paresthesias, weakness, altered mental status, syncope, change in vision, chest pain, back pain, abdominal pain, n/v/d, recent leg pain or swelling, or sick contacts.

## 2018-06-17 LAB
ALBUMIN SERPL ELPH-MCNC: 2.7 G/DL — LOW (ref 3.3–5)
ALP SERPL-CCNC: 121 U/L — HIGH (ref 40–120)
ALT FLD-CCNC: 85 U/L — HIGH (ref 10–45)
ANION GAP SERPL CALC-SCNC: 15 MMOL/L — SIGNIFICANT CHANGE UP (ref 5–17)
AST SERPL-CCNC: 97 U/L — HIGH (ref 10–40)
BILIRUB DIRECT SERPL-MCNC: 0.2 MG/DL — SIGNIFICANT CHANGE UP (ref 0–0.2)
BILIRUB INDIRECT FLD-MCNC: 0.3 MG/DL — SIGNIFICANT CHANGE UP (ref 0.2–1)
BILIRUB SERPL-MCNC: 0.5 MG/DL — SIGNIFICANT CHANGE UP (ref 0.2–1.2)
BUN SERPL-MCNC: 28 MG/DL — HIGH (ref 7–23)
CALCIUM SERPL-MCNC: 8.6 MG/DL — SIGNIFICANT CHANGE UP (ref 8.4–10.5)
CHLORIDE SERPL-SCNC: 103 MMOL/L — SIGNIFICANT CHANGE UP (ref 96–108)
CO2 SERPL-SCNC: 18 MMOL/L — LOW (ref 22–31)
CREAT SERPL-MCNC: 1.72 MG/DL — HIGH (ref 0.5–1.3)
FOLATE SERPL-MCNC: 14.5 NG/ML — SIGNIFICANT CHANGE UP
GLUCOSE BLDC GLUCOMTR-MCNC: 193 MG/DL — HIGH (ref 70–99)
GLUCOSE BLDC GLUCOMTR-MCNC: 235 MG/DL — HIGH (ref 70–99)
GLUCOSE BLDC GLUCOMTR-MCNC: 284 MG/DL — HIGH (ref 70–99)
GLUCOSE BLDC GLUCOMTR-MCNC: 324 MG/DL — HIGH (ref 70–99)
GLUCOSE SERPL-MCNC: 230 MG/DL — HIGH (ref 70–99)
HAV IGM SER-ACNC: SIGNIFICANT CHANGE UP
HBA1C BLD-MCNC: 10 % — HIGH (ref 4–5.6)
HBV CORE IGM SER-ACNC: SIGNIFICANT CHANGE UP
HBV SURFACE AG SER-ACNC: SIGNIFICANT CHANGE UP
HCT VFR BLD CALC: 33.1 % — LOW (ref 39–50)
HCV AB S/CO SERPL IA: 0.1 S/CO — SIGNIFICANT CHANGE UP
HCV AB SERPL-IMP: SIGNIFICANT CHANGE UP
HGB BLD-MCNC: 10.7 G/DL — LOW (ref 13–17)
MAGNESIUM SERPL-MCNC: 2.2 MG/DL — SIGNIFICANT CHANGE UP (ref 1.6–2.6)
MCHC RBC-ENTMCNC: 27.3 PG — SIGNIFICANT CHANGE UP (ref 27–34)
MCHC RBC-ENTMCNC: 32.3 GM/DL — SIGNIFICANT CHANGE UP (ref 32–36)
MCV RBC AUTO: 84.4 FL — SIGNIFICANT CHANGE UP (ref 80–100)
PHOSPHATE SERPL-MCNC: 2.7 MG/DL — SIGNIFICANT CHANGE UP (ref 2.5–4.5)
PLATELET # BLD AUTO: 199 K/UL — SIGNIFICANT CHANGE UP (ref 150–400)
POTASSIUM SERPL-MCNC: 3.9 MMOL/L — SIGNIFICANT CHANGE UP (ref 3.5–5.3)
POTASSIUM SERPL-SCNC: 3.9 MMOL/L — SIGNIFICANT CHANGE UP (ref 3.5–5.3)
PROT SERPL-MCNC: 8 G/DL — SIGNIFICANT CHANGE UP (ref 6–8.3)
RBC # BLD: 3.92 M/UL — LOW (ref 4.2–5.8)
RBC # FLD: 15.4 % — HIGH (ref 10.3–14.5)
SODIUM SERPL-SCNC: 136 MMOL/L — SIGNIFICANT CHANGE UP (ref 135–145)
TSH SERPL-MCNC: 1.2 UIU/ML — SIGNIFICANT CHANGE UP (ref 0.27–4.2)
VIT B12 SERPL-MCNC: 880 PG/ML — SIGNIFICANT CHANGE UP (ref 232–1245)
WBC # BLD: 11.61 K/UL — HIGH (ref 3.8–10.5)
WBC # FLD AUTO: 11.61 K/UL — HIGH (ref 3.8–10.5)

## 2018-06-17 PROCEDURE — 36000 PLACE NEEDLE IN VEIN: CPT

## 2018-06-17 PROCEDURE — 71045 X-RAY EXAM CHEST 1 VIEW: CPT | Mod: 26

## 2018-06-17 RX ORDER — IPRATROPIUM/ALBUTEROL SULFATE 18-103MCG
3 AEROSOL WITH ADAPTER (GRAM) INHALATION ONCE
Qty: 0 | Refills: 0 | Status: COMPLETED | OUTPATIENT
Start: 2018-06-17 | End: 2018-06-17

## 2018-06-17 RX ORDER — HEPARIN SODIUM 5000 [USP'U]/ML
5000 INJECTION INTRAVENOUS; SUBCUTANEOUS EVERY 12 HOURS
Qty: 0 | Refills: 0 | Status: DISCONTINUED | OUTPATIENT
Start: 2018-06-17 | End: 2018-06-23

## 2018-06-17 RX ADMIN — AZITHROMYCIN 250 MILLIGRAM(S): 500 TABLET, FILM COATED ORAL at 17:40

## 2018-06-17 RX ADMIN — Medication 2: at 12:43

## 2018-06-17 RX ADMIN — CEFTRIAXONE 100 GRAM(S): 500 INJECTION, POWDER, FOR SOLUTION INTRAMUSCULAR; INTRAVENOUS at 21:29

## 2018-06-17 RX ADMIN — LISINOPRIL 10 MILLIGRAM(S): 2.5 TABLET ORAL at 05:29

## 2018-06-17 RX ADMIN — PANTOPRAZOLE SODIUM 40 MILLIGRAM(S): 20 TABLET, DELAYED RELEASE ORAL at 05:29

## 2018-06-17 RX ADMIN — AMLODIPINE BESYLATE 10 MILLIGRAM(S): 2.5 TABLET ORAL at 05:29

## 2018-06-17 RX ADMIN — Medication 3 MILLILITER(S): at 21:21

## 2018-06-17 RX ADMIN — Medication 3: at 17:41

## 2018-06-17 RX ADMIN — Medication 1: at 08:26

## 2018-06-17 RX ADMIN — TAMSULOSIN HYDROCHLORIDE 0.4 MILLIGRAM(S): 0.4 CAPSULE ORAL at 21:21

## 2018-06-17 RX ADMIN — Medication 2: at 21:37

## 2018-06-17 RX ADMIN — HEPARIN SODIUM 5000 UNIT(S): 5000 INJECTION INTRAVENOUS; SUBCUTANEOUS at 17:44

## 2018-06-17 RX ADMIN — ATORVASTATIN CALCIUM 10 MILLIGRAM(S): 80 TABLET, FILM COATED ORAL at 21:21

## 2018-06-17 NOTE — PROGRESS NOTE ADULT - ASSESSMENT
89 y/o M with h/o HTN, chol, LBBB, DM2, BPH, PVD, AS s/p valve replacement July 2015 recent 6 month vacation outside the country to [place] and [place] p/w cough with hemoptysis, fever, chills, and night sweats x3 days. Pt was feeling fine previously, began having a cough and some "sniffles" 4 days ago after gardening all day, 3 days ago began having a dry non-productive cough that progressed to a cough with hemoptysis described as bright red blood tinged sputum. Pt reports breathing quickly, +SOB. +myalgias/arthralgias 2d ago resolved with Bengay.     cap improved  - ceftriaxone  - azithro  - blood cultures    bhavya  - iv fluids  - check renal sono    abnormal lft's  - check liver sono    htn  - cw home meds    diabetes  - insulin sliding scale    dvt px 89 y/o M with h/o HTN, chol, LBBB, DM2, BPH, PVD, AS s/p valve replacement July 2015 recent 6 month vacation outside the country to [place] and [place] p/w cough with hemoptysis, fever, chills, and night sweats x3 days. Pt was feeling fine previously, began having a cough and some "sniffles" 4 days ago after gardening all day, 3 days ago began having a dry non-productive cough that progressed to a cough with hemoptysis described as bright red blood tinged sputum. Pt reports breathing quickly, +SOB. +myalgias/arthralgias 2d ago resolved with Bengay.     cap improved  - ceftriaxone  - azithro  - blood cultures  - need to r/o tb  - airborne isolation  - sputum for afb  - QuantiFeron gold already sent    bhavya  - iv fluids  - check renal sono    abnormal lft's  - check liver sono    htn  - cw home meds    diabetes  - insulin sliding scale    dvt px 89 y/o M with h/o HTN, chol, LBBB, DM2, BPH, PVD, AS s/p valve replacement July 2015 recent 6 month vacation outside the country to [place] and [place] p/w cough with hemoptysis, fever, chills, and night sweats x3 days. Pt was feeling fine previously, began having a cough and some "sniffles" 4 days ago after gardening all day, 3 days ago began having a dry non-productive cough that progressed to a cough with hemoptysis described as bright red blood tinged sputum. Pt reports breathing quickly, +SOB. +myalgias/arthralgias 2d ago resolved with Bengay.     cap improved  - ceftriaxone  - azithro  - blood cultures  - need to r/o tb  - airborne isolation  - sputum for afb  - QuantiFeron gold already sent    bhavya  - iv fluids  - check renal sono    abnormal lft's  - check liver sono    htn  - cw home meds    diabetes  - insulin sliding scale  - hgb a1c    dvt px

## 2018-06-18 DIAGNOSIS — E11.9 TYPE 2 DIABETES MELLITUS WITHOUT COMPLICATIONS: ICD-10-CM

## 2018-06-18 DIAGNOSIS — J18.9 PNEUMONIA, UNSPECIFIED ORGANISM: ICD-10-CM

## 2018-06-18 DIAGNOSIS — I10 ESSENTIAL (PRIMARY) HYPERTENSION: ICD-10-CM

## 2018-06-18 LAB
ALBUMIN SERPL ELPH-MCNC: 2.4 G/DL — LOW (ref 3.3–5)
ALP SERPL-CCNC: 125 U/L — HIGH (ref 40–120)
ALT FLD-CCNC: 284 U/L — HIGH (ref 10–45)
ANION GAP SERPL CALC-SCNC: 16 MMOL/L — SIGNIFICANT CHANGE UP (ref 5–17)
ANISOCYTOSIS BLD QL: SLIGHT — SIGNIFICANT CHANGE UP
APPEARANCE UR: CLEAR — SIGNIFICANT CHANGE UP
AST SERPL-CCNC: 467 U/L — HIGH (ref 10–40)
BASOPHILS # BLD AUTO: 0.01 K/UL — SIGNIFICANT CHANGE UP (ref 0–0.2)
BASOPHILS NFR BLD AUTO: 0.1 % — SIGNIFICANT CHANGE UP (ref 0–2)
BILIRUB DIRECT SERPL-MCNC: 0.2 MG/DL — SIGNIFICANT CHANGE UP (ref 0–0.2)
BILIRUB INDIRECT FLD-MCNC: 0.4 MG/DL — SIGNIFICANT CHANGE UP (ref 0.2–1)
BILIRUB SERPL-MCNC: 0.6 MG/DL — SIGNIFICANT CHANGE UP (ref 0.2–1.2)
BILIRUB UR-MCNC: NEGATIVE — SIGNIFICANT CHANGE UP
BUN SERPL-MCNC: 26 MG/DL — HIGH (ref 7–23)
BURR CELLS BLD QL SMEAR: SLIGHT — SIGNIFICANT CHANGE UP
CALCIUM SERPL-MCNC: 8.7 MG/DL — SIGNIFICANT CHANGE UP (ref 8.4–10.5)
CHLORIDE SERPL-SCNC: 102 MMOL/L — SIGNIFICANT CHANGE UP (ref 96–108)
CO2 SERPL-SCNC: 18 MMOL/L — LOW (ref 22–31)
COLOR SPEC: YELLOW — SIGNIFICANT CHANGE UP
COMMENT - URINE: SIGNIFICANT CHANGE UP
COMMENT - URINE: SIGNIFICANT CHANGE UP
CREAT SERPL-MCNC: 1.54 MG/DL — HIGH (ref 0.5–1.3)
DIFF PNL FLD: ABNORMAL
EOSINOPHIL # BLD AUTO: 0.22 K/UL — SIGNIFICANT CHANGE UP (ref 0–0.5)
EOSINOPHIL NFR BLD AUTO: 2.6 % — SIGNIFICANT CHANGE UP (ref 0–6)
EPI CELLS # UR: SIGNIFICANT CHANGE UP /HPF
GLUCOSE BLDC GLUCOMTR-MCNC: 179 MG/DL — HIGH (ref 70–99)
GLUCOSE BLDC GLUCOMTR-MCNC: 217 MG/DL — HIGH (ref 70–99)
GLUCOSE BLDC GLUCOMTR-MCNC: 242 MG/DL — HIGH (ref 70–99)
GLUCOSE BLDC GLUCOMTR-MCNC: 251 MG/DL — HIGH (ref 70–99)
GLUCOSE SERPL-MCNC: 261 MG/DL — HIGH (ref 70–99)
GLUCOSE UR QL: NEGATIVE — SIGNIFICANT CHANGE UP
GRAN CASTS # UR COMP ASSIST: ABNORMAL
HCT VFR BLD CALC: 33 % — LOW (ref 39–50)
HGB BLD-MCNC: 11 G/DL — LOW (ref 13–17)
IMM GRANULOCYTES NFR BLD AUTO: 0.5 % — SIGNIFICANT CHANGE UP (ref 0–1.5)
KETONES UR-MCNC: NEGATIVE — SIGNIFICANT CHANGE UP
LEUKOCYTE ESTERASE UR-ACNC: NEGATIVE — SIGNIFICANT CHANGE UP
LYMPHOCYTES # BLD AUTO: 0.47 K/UL — LOW (ref 1–3.3)
LYMPHOCYTES # BLD AUTO: 5.5 % — LOW (ref 13–44)
M TB TUBERC IFN-G BLD QL: 0.01 IU/ML — SIGNIFICANT CHANGE UP
M TB TUBERC IFN-G BLD QL: 0.06 IU/ML — SIGNIFICANT CHANGE UP
M TB TUBERC IFN-G BLD QL: NEGATIVE — SIGNIFICANT CHANGE UP
MAGNESIUM SERPL-MCNC: 2.3 MG/DL — SIGNIFICANT CHANGE UP (ref 1.6–2.6)
MANUAL SMEAR VERIFICATION: SIGNIFICANT CHANGE UP
MCHC RBC-ENTMCNC: 27.7 PG — SIGNIFICANT CHANGE UP (ref 27–34)
MCHC RBC-ENTMCNC: 33.3 GM/DL — SIGNIFICANT CHANGE UP (ref 32–36)
MCV RBC AUTO: 83.1 FL — SIGNIFICANT CHANGE UP (ref 80–100)
MITOGEN IGNF BCKGRD COR BLD-ACNC: 2.17 IU/ML — SIGNIFICANT CHANGE UP
MONOCYTES # BLD AUTO: 0.79 K/UL — SIGNIFICANT CHANGE UP (ref 0–0.9)
MONOCYTES NFR BLD AUTO: 9.3 % — SIGNIFICANT CHANGE UP (ref 2–14)
NEUTROPHILS # BLD AUTO: 6.98 K/UL — SIGNIFICANT CHANGE UP (ref 1.8–7.4)
NEUTROPHILS NFR BLD AUTO: 82 % — HIGH (ref 43–77)
NIGHT BLUE STAIN TISS: SIGNIFICANT CHANGE UP
NITRITE UR-MCNC: NEGATIVE — SIGNIFICANT CHANGE UP
OVALOCYTES BLD QL SMEAR: SLIGHT — SIGNIFICANT CHANGE UP
PH UR: 6 — SIGNIFICANT CHANGE UP (ref 5–8)
PHOSPHATE SERPL-MCNC: 1.9 MG/DL — LOW (ref 2.5–4.5)
PLAT MORPH BLD: NORMAL — SIGNIFICANT CHANGE UP
PLATELET # BLD AUTO: 218 K/UL — SIGNIFICANT CHANGE UP (ref 150–400)
POIKILOCYTOSIS BLD QL AUTO: SLIGHT — SIGNIFICANT CHANGE UP
POTASSIUM SERPL-MCNC: 3.5 MMOL/L — SIGNIFICANT CHANGE UP (ref 3.5–5.3)
POTASSIUM SERPL-SCNC: 3.5 MMOL/L — SIGNIFICANT CHANGE UP (ref 3.5–5.3)
PROT SERPL-MCNC: 7.2 G/DL — SIGNIFICANT CHANGE UP (ref 6–8.3)
PROT UR-MCNC: 100 MG/DL
RAPID RVP RESULT: SIGNIFICANT CHANGE UP
RBC # BLD: 3.97 M/UL — LOW (ref 4.2–5.8)
RBC # FLD: 15.4 % — HIGH (ref 10.3–14.5)
RBC BLD AUTO: ABNORMAL
RBC CASTS # UR COMP ASSIST: SIGNIFICANT CHANGE UP /HPF (ref 0–2)
SODIUM SERPL-SCNC: 136 MMOL/L — SIGNIFICANT CHANGE UP (ref 135–145)
SP GR SPEC: 1.02 — SIGNIFICANT CHANGE UP (ref 1.01–1.02)
SPECIMEN SOURCE: SIGNIFICANT CHANGE UP
UROBILINOGEN FLD QL: NEGATIVE — SIGNIFICANT CHANGE UP
WBC # BLD: 8.51 K/UL — SIGNIFICANT CHANGE UP (ref 3.8–10.5)
WBC # FLD AUTO: 8.51 K/UL — SIGNIFICANT CHANGE UP (ref 3.8–10.5)
WBC UR QL: SIGNIFICANT CHANGE UP /HPF (ref 0–5)

## 2018-06-18 PROCEDURE — 76700 US EXAM ABDOM COMPLETE: CPT | Mod: 26

## 2018-06-18 PROCEDURE — 99222 1ST HOSP IP/OBS MODERATE 55: CPT | Mod: GC

## 2018-06-18 RX ORDER — POTASSIUM PHOSPHATE, MONOBASIC POTASSIUM PHOSPHATE, DIBASIC 236; 224 MG/ML; MG/ML
15 INJECTION, SOLUTION INTRAVENOUS ONCE
Qty: 0 | Refills: 0 | Status: DISCONTINUED | OUTPATIENT
Start: 2018-06-18 | End: 2018-06-18

## 2018-06-18 RX ORDER — INSULIN LISPRO 100/ML
8 VIAL (ML) SUBCUTANEOUS
Qty: 0 | Refills: 0 | Status: DISCONTINUED | OUTPATIENT
Start: 2018-06-18 | End: 2018-06-23

## 2018-06-18 RX ORDER — INSULIN LISPRO 100/ML
7 VIAL (ML) SUBCUTANEOUS
Qty: 0 | Refills: 0 | Status: DISCONTINUED | OUTPATIENT
Start: 2018-06-18 | End: 2018-06-18

## 2018-06-18 RX ORDER — ACETAMINOPHEN 500 MG
1000 TABLET ORAL ONCE
Qty: 0 | Refills: 0 | Status: COMPLETED | OUTPATIENT
Start: 2018-06-18 | End: 2018-06-18

## 2018-06-18 RX ORDER — INSULIN GLARGINE 100 [IU]/ML
14 INJECTION, SOLUTION SUBCUTANEOUS AT BEDTIME
Qty: 0 | Refills: 0 | Status: DISCONTINUED | OUTPATIENT
Start: 2018-06-18 | End: 2018-06-18

## 2018-06-18 RX ORDER — IPRATROPIUM/ALBUTEROL SULFATE 18-103MCG
3 AEROSOL WITH ADAPTER (GRAM) INHALATION ONCE
Qty: 0 | Refills: 0 | Status: COMPLETED | OUTPATIENT
Start: 2018-06-18 | End: 2018-06-18

## 2018-06-18 RX ORDER — INSULIN GLARGINE 100 [IU]/ML
14 INJECTION, SOLUTION SUBCUTANEOUS AT BEDTIME
Qty: 0 | Refills: 0 | Status: DISCONTINUED | OUTPATIENT
Start: 2018-06-18 | End: 2018-06-23

## 2018-06-18 RX ORDER — POTASSIUM PHOSPHATE, MONOBASIC POTASSIUM PHOSPHATE, DIBASIC 236; 224 MG/ML; MG/ML
30 INJECTION, SOLUTION INTRAVENOUS ONCE
Qty: 0 | Refills: 0 | Status: COMPLETED | OUTPATIENT
Start: 2018-06-18 | End: 2018-06-18

## 2018-06-18 RX ADMIN — Medication 3: at 08:20

## 2018-06-18 RX ADMIN — Medication 7 UNIT(S): at 17:10

## 2018-06-18 RX ADMIN — INSULIN GLARGINE 14 UNIT(S): 100 INJECTION, SOLUTION SUBCUTANEOUS at 22:52

## 2018-06-18 RX ADMIN — CEFTRIAXONE 100 GRAM(S): 500 INJECTION, POWDER, FOR SOLUTION INTRAMUSCULAR; INTRAVENOUS at 17:13

## 2018-06-18 RX ADMIN — POTASSIUM PHOSPHATE, MONOBASIC POTASSIUM PHOSPHATE, DIBASIC 83.33 MILLIMOLE(S): 236; 224 INJECTION, SOLUTION INTRAVENOUS at 13:32

## 2018-06-18 RX ADMIN — Medication 400 MILLIGRAM(S): at 21:54

## 2018-06-18 RX ADMIN — PANTOPRAZOLE SODIUM 40 MILLIGRAM(S): 20 TABLET, DELAYED RELEASE ORAL at 07:25

## 2018-06-18 RX ADMIN — HEPARIN SODIUM 5000 UNIT(S): 5000 INJECTION INTRAVENOUS; SUBCUTANEOUS at 05:20

## 2018-06-18 RX ADMIN — Medication 2: at 12:37

## 2018-06-18 RX ADMIN — AMLODIPINE BESYLATE 10 MILLIGRAM(S): 2.5 TABLET ORAL at 05:15

## 2018-06-18 RX ADMIN — AZITHROMYCIN 250 MILLIGRAM(S): 500 TABLET, FILM COATED ORAL at 22:29

## 2018-06-18 RX ADMIN — Medication 3 MILLILITER(S): at 21:38

## 2018-06-18 RX ADMIN — TAMSULOSIN HYDROCHLORIDE 0.4 MILLIGRAM(S): 0.4 CAPSULE ORAL at 22:51

## 2018-06-18 RX ADMIN — Medication 2: at 17:11

## 2018-06-18 RX ADMIN — HEPARIN SODIUM 5000 UNIT(S): 5000 INJECTION INTRAVENOUS; SUBCUTANEOUS at 17:07

## 2018-06-18 RX ADMIN — LISINOPRIL 10 MILLIGRAM(S): 2.5 TABLET ORAL at 05:14

## 2018-06-18 NOTE — CONSULT NOTE ADULT - ATTENDING COMMENTS
Patient seen and examined  Above verified  Agree with above unless as noted below.  Likely CAP  Given short duration of symptoms and CXR/CT findings unlikely Mycobacterial  Continue ceftriaxone ,zithrromax  Follow pending studies-urine legionella,sputum and blood Cx  Will tailor plan for ID issues  per course,results.Will defer to primary team on management of other issues.  Case d/w Med Np  Will Follow.  Beeper [id number]/[email or web address]/No response-[id number]

## 2018-06-18 NOTE — CONSULT NOTE ADULT - SUBJECTIVE AND OBJECTIVE BOX
HPI:  89 y/o M with h/o HTN, chol, LBBB, DM2, BPH, PVD, AS s/p valve replacement July 2015 recent 6 month vacation outside the country to [place] and [facility] p/w cough with hemoptysis, fever, chills, and night sweats x3 days. Pt was feeling fine previously, began having a cough and some "sniffles" 4 days ago after gardening all day, 3 days ago began having a dry non-productive cough that progressed to a cough with hemoptysis described as bright red blood tinged sputum. Pt reports breathing quickly, +SOB. +myalgias/arthralgias 2d ago resolved with Bengay. No advil or tylenol since Thursday. Denies massive hemoptysis. Pt had no known TB exposure, no known prior h/o TB, no known prior PPD. 2 pack year remote smoking hx, quit 60 years ago. Pt denies prior episodes, recent/recurrent lung infections, respiratory dz, known h/o CA, wt loss, numbness, paresthesias, weakness, altered mental status, syncope, change in vision, chest pain, back pain, abdominal pain, n/v/d, recent leg pain or swelling, or sick contacts. (16 Jun 2018 16:58)    ID note-above reviewed.     PAST MEDICAL & SURGICAL HISTORY:  Vocal cord polyp: pending treatment diagnosed in 2014  BPH (benign prostatic hypertrophy)  PVD (peripheral vascular disease)  LBBB (left bundle branch block): incomplete  HTN (hypertension)  Hypercholesterolemia  DM2 (diabetes mellitus, type 2)  AS (aortic stenosis)  History of artificial heart valve      Allergies  Cipro (Pruritus; Rash)        ANTIMICROBIALS:  azithromycin  IVPB 500 every 24 hours  cefTRIAXone   IVPB 1 every 24 hours      OTHER MEDS: MEDICATIONS  (STANDING):  amLODIPine   Tablet 10 daily  dextrose 40% Gel 15 once PRN  dextrose 50% Injectable 12.5 once  dextrose 50% Injectable 25 once  dextrose 50% Injectable 25 once  glucagon  Injectable 1 once PRN  heparin  Injectable 5000 every 12 hours  insulin glargine Injectable (LANTUS) 14 at bedtime  insulin lispro (HumaLOG) corrective regimen sliding scale  three times a day before meals  insulin lispro (HumaLOG) corrective regimen sliding scale  at bedtime  insulin lispro Injectable (HumaLOG) 7 three times a day before meals  lisinopril 10 daily  pantoprazole    Tablet 40 before breakfast  tamsulosin 0.4 at bedtime      SOCIAL HISTORY:  [ ] etoh [ ] tobacco [ ] former smoker [ ] IVDU    FAMILY HISTORY:  No pertinent family history in first degree relatives      REVIEW OF SYSTEMS  [  ] ROS unobtainable because:    [ x ] All other systems negative except as noted below:	    Constitutional:  [ ] fever [ ] chills  [ ] weight loss  [ ] weakness  Skin:  [ ] rash [ ] phlebitis	  Eyes: [ ] icterus [ ] pain  [ ] discharge	  ENMT: [ ] sore throat  [ ] thrush [ ] ulcers [ ] exudates  Respiratory: [ ] dyspnea [ ] hemoptysis [ ] cough [ ] sputum	  Cardiovascular:  [ ] chest pain [ ] palpitations [ ] edema	  Gastrointestinal:  [ ] nausea [ ] vomiting [ ] diarrhea [ ] constipation [ ] pain	  Genitourinary:  [ ] dysuria [ ] frequency [ ] hematuria [ ] discharge [ ] flank pain  [ ] incontinence  Musculoskeletal:  [ ] myalgias [ ] arthralgias [ ] arthritis  [ ] back pain  Neurological:  [ ] headache [ ] seizures  [ ] confusion/altered mental status  Psychiatric:  [ ] anxiety [ ] depression	  Hematology/Lymphatics:  [ ] lymphadenopathy  Endocrine:  [ ] adrenal [ ] thyroid  Allergic/Immunologic:	 [ ] transplant [ ] seasonal    Vital Signs Last 24 Hrs  T(F): 99.6 (06-18-18 @ 05:24), Max: 102.2 (06-16-18 @ 14:33)    Vital Signs Last 24 Hrs  HR: 92 (06-18-18 @ 05:24) (74 - 93)  BP: 138/66 (06-18-18 @ 05:24) (132/77 - 149/64)  RR: 20 (06-18-18 @ 05:24)  SpO2: 96% (06-18-18 @ 05:24) (95% - 99%)  Wt(kg): --    PHYSICAL EXAM:  General: non-toxic  HEAD/EYES: anicteric, PERRL  ENT:  supple  Cardiovascular:   S1, S2  Respiratory:  clear bilaterally  GI:  soft, non-tender, normal bowel sounds  :  no CVA tenderness   Musculoskeletal:  no synovitis  Neurologic:  grossly non-focal  Skin:  no rash  Lymph: no lymphadenopathy  Psychiatric:  appropriate affect  Vascular:  no phlebitis                                11.0   8.51  )-----------( 218      ( 18 Jun 2018 08:00 )             33.0       06-18    136  |  102  |  26<H>  ----------------------------<  261<H>  3.5   |  18<L>  |  1.54<H>    Ca    8.7      18 Jun 2018 06:27  Phos  1.9     06-18  Mg     2.3     06-18    TPro  7.2  /  Alb  2.4<L>  /  TBili  0.6  /  DBili  0.2  /  AST  467<H>  /  ALT  284<H>  /  AlkPhos  125<H>  06-18          MICROBIOLOGY:  .Blood Blood  06-16-18   No growth to date.  --  --              v            RADIOLOGY:   < from: Xray Chest 1 View- PORTABLE-Urgent (06.17.18 @ 21:43) >  Impression:    The heart is normal in size. Diffuse airspace disease is present   compatible with pulmonary edema. Multifocal pneumonia cannot be ruled   out. This appears to be worsening when compared to previous study done   June 16, 2018.    < end of copied text >    < from: CT Chest No Cont (06.16.18 @ 14:53) >  FINDINGS:    CHEST:     LUNGS AND LARGE AIRWAYS: Patent central airways. Right upper and middle   lobe patchy consolidations compatible with infection. No pulmonary   nodules.  PLEURA: Small right pleural effusion.  VESSELS: Within normal limits.  HEART: Aortic valve replacement. Heart size is normal. No pericardial   effusion.  MEDIASTINUM AND URIEL: No lymphadenopathy.  CHEST WALL AND LOWER NECK: Enlarged heterogeneous thyroid gland.  VISUALIZED UPPER ABDOMEN: Within normal limits.  BONES: Within normal limits.    IMPRESSION:     Right upper and middle lobe patchy consolidations compatible with   infection.    Small right pleural effusion.    < end of copied text >  < from: CT Chest No Cont (06.16.18 @ 14:53) >  FINDINGS:    CHEST:     LUNGS AND LARGE AIRWAYS: Patent central airways. Right upper and middle   lobe patchy consolidations compatible with infection. No pulmonary   nodules.  PLEURA: Small right pleural effusion.  VESSELS: Within normal limits.  HEART: Aortic valve replacement. Heart size is normal. No pericardial   effusion.  MEDIASTINUM AND URIEL: No lymphadenopathy.  CHEST WALL AND LOWER NECK: Enlarged heterogeneous thyroid gland.  VISUALIZED UPPER ABDOMEN: Within normal limits.  BONES: Within normal limits.    IMPRESSION:     Right upper and middle lobe patchy consolidations compatible with   infection.    Small right pleural effusion.    < end of copied text > HPI:  89 y/o M with h/o HTN, chol, LBBB, DM2, BPH, PVD, AS s/p valve replacement July 2015 recent 6 month vacation outside the country to [place] and [place] p/w cough with hemoptysis, fever, chills, and night sweats x3 days. Pt was feeling fine previously, began having a cough and some "sniffles" 4 days ago after gardening all day, 3 days ago began having a dry non-productive cough that progressed to a cough with hemoptysis described as bright red blood tinged sputum. Pt reports breathing quickly, +SOB. +myalgias/arthralgias 2d ago resolved with Bengay. No advil or tylenol since Thursday. Denies massive hemoptysis. Pt had no known TB exposure, no known prior h/o TB, no known prior PPD. 2 pack year remote smoking hx, quit 60 years ago. Pt denies prior episodes, recent/recurrent lung infections, respiratory dz, known h/o CA, wt loss, numbness, paresthesias, weakness, altered mental status, syncope, change in vision, chest pain, back pain, abdominal pain, n/v/d, recent leg pain or swelling, or sick contacts. (16 Jun 2018 16:58)    ID note-above reviewed.   Originally from [place], moved here >60 y ago but visits every year . This year travelled from [place]nd to [place] then went to [facility] Feb 2nd- April 19, then April 19- June 2nd in [place] again, and returned to [place] on June 2nd. After returning about a week later was out gardening when the weather was about 60 F the next day mentioned feeling very cold. He started to have a dry cough which later became productive with yellow green sputum and streaks of blood, with night sweats and subjective fever. Had diarrhea x 1 prior to coming and once in hospital however he had taken laxatives both times.  Does not recall ever having PPD or quant gold.   Now feeling better      PAST MEDICAL & SURGICAL HISTORY:  Vocal cord polyp: pending treatment diagnosed in 2014  BPH (benign prostatic hypertrophy)  PVD (peripheral vascular disease)  LBBB (left bundle branch block): incomplete  HTN (hypertension)  Hypercholesterolemia  DM2 (diabetes mellitus, type 2)  AS (aortic stenosis)  History of artificial heart valve      Allergies  Cipro (Pruritus; Rash)        ANTIMICROBIALS:  azithromycin  IVPB 500 every 24 hours  cefTRIAXone   IVPB 1 every 24 hours      OTHER MEDS: MEDICATIONS  (STANDING):  amLODIPine   Tablet 10 daily  dextrose 40% Gel 15 once PRN  dextrose 50% Injectable 12.5 once  dextrose 50% Injectable 25 once  dextrose 50% Injectable 25 once  glucagon  Injectable 1 once PRN  heparin  Injectable 5000 every 12 hours  insulin glargine Injectable (LANTUS) 14 at bedtime  insulin lispro (HumaLOG) corrective regimen sliding scale  three times a day before meals  insulin lispro (HumaLOG) corrective regimen sliding scale  at bedtime  insulin lispro Injectable (HumaLOG) 7 three times a day before meals  lisinopril 10 daily  pantoprazole    Tablet 40 before breakfast  tamsulosin 0.4 at bedtime      SOCIAL HISTORY:  retired, worked in [employer] on [address], no pets no sick contacts, Travel history as above[ ] etoh [ ] tobacco [ x] former smoker [ ] IVDU    FAMILY HISTORY:  No pertinent family history in first degree relatives      REVIEW OF SYSTEMS  [  ] ROS unobtainable because:    [ x ] All other systems negative except as noted below:	    Constitutional:  [ x] fever [x ] chills  [ ] weight loss  [x ] weakness  Skin:  [ ] rash [ ] phlebitis	  Eyes: [ ] icterus [ ] pain  [ ] discharge	  ENMT: [ ] sore throat  [ ] thrush [ ] ulcers [ ] exudates  Respiratory: [ ] dyspnea [ x] hemoptysis [ x] cough [ x] sputum	 +night sweats  Cardiovascular:  [ ] chest pain [ ] palpitations [ ] edema	  Gastrointestinal:  [ ] nausea [ ] vomiting [ ] diarrhea [ ] constipation [ ] pain	  Genitourinary:  [ ] dysuria [ ] frequency [ ] hematuria [ ] discharge [ ] flank pain  [ ] incontinence  Musculoskeletal:  [ ] myalgias [ ] arthralgias [ ] arthritis  [ ] back pain  Neurological:  [ ] headache [ ] seizures  [ ] confusion/altered mental status  Psychiatric:  [ ] anxiety [ ] depression	  Hematology/Lymphatics:  [ ] lymphadenopathy  Endocrine:  [ ] adrenal [ ] thyroid  Allergic/Immunologic:	 [ ] transplant [ ] seasonal    Vital Signs Last 24 Hrs  T(F): 99.6 (06-18-18 @ 05:24), Max: 102.2 (06-16-18 @ 14:33)    Vital Signs Last 24 Hrs  HR: 92 (06-18-18 @ 05:24) (74 - 93)  BP: 138/66 (06-18-18 @ 05:24) (132/77 - 149/64)  RR: 20 (06-18-18 @ 05:24)  SpO2: 96% (06-18-18 @ 05:24) (95% - 99%)  Wt(kg): --    PHYSICAL EXAM:  General: non-toxic on 4 L oxygen via nasal canula  HEAD/EYES: anicteric, PERRL  ENT:  supple  Cardiovascular:   S1, S2 + SADIA  Respiratory:  right sided inspiratory crackles  GI:  soft, non-tender, normal bowel sounds  :  no CVA tenderness   Musculoskeletal:  no synovitis  Neurologic:  grossly non-focal  Skin:  no rash  Lymph: no lymphadenopathy  Psychiatric:  appropriate affect  Vascular:  no phlebitis                                11.0   8.51  )-----------( 218      ( 18 Jun 2018 08:00 )             33.0       06-18    136  |  102  |  26<H>  ----------------------------<  261<H>  3.5   |  18<L>  |  1.54<H>    Ca    8.7      18 Jun 2018 06:27  Phos  1.9     06-18  Mg     2.3     06-18    TPro  7.2  /  Alb  2.4<L>  /  TBili  0.6  /  DBili  0.2  /  AST  467<H>  /  ALT  284<H>  /  AlkPhos  125<H>  06-18          MICROBIOLOGY:  .Blood Blood  06-16-18   No growth to date.  --  --              v            RADIOLOGY:   < from: Xray Chest 1 View- PORTABLE-Urgent (06.17.18 @ 21:43) >  Impression:    The heart is normal in size. Diffuse airspace disease is present   compatible with pulmonary edema. Multifocal pneumonia cannot be ruled   out. This appears to be worsening when compared to previous study done   June 16, 2018.    < end of copied text >    < from: CT Chest No Cont (06.16.18 @ 14:53) >  FINDINGS:    CHEST:     LUNGS AND LARGE AIRWAYS: Patent central airways. Right upper and middle   lobe patchy consolidations compatible with infection. No pulmonary   nodules.  PLEURA: Small right pleural effusion.  VESSELS: Within normal limits.  HEART: Aortic valve replacement. Heart size is normal. No pericardial   effusion.  MEDIASTINUM AND URIEL: No lymphadenopathy.  CHEST WALL AND LOWER NECK: Enlarged heterogeneous thyroid gland.  VISUALIZED UPPER ABDOMEN: Within normal limits.  BONES: Within normal limits.    IMPRESSION:     Right upper and middle lobe patchy consolidations compatible with   infection.    Small right pleural effusion.    < end of copied text >  < from: CT Chest No Cont (06.16.18 @ 14:53) >  FINDINGS:    CHEST:     LUNGS AND LARGE AIRWAYS: Patent central airways. Right upper and middle   lobe patchy consolidations compatible with infection. No pulmonary   nodules.  PLEURA: Small right pleural effusion.  VESSELS: Within normal limits.  HEART: Aortic valve replacement. Heart size is normal. No pericardial   effusion.  MEDIASTINUM AND URIEL: No lymphadenopathy.  CHEST WALL AND LOWER NECK: Enlarged heterogeneous thyroid gland.  VISUALIZED UPPER ABDOMEN: Within normal limits.  BONES: Within normal limits.    IMPRESSION:     Right upper and middle lobe patchy consolidations compatible with   infection.    Small right pleural effusion.    < end of copied text >

## 2018-06-18 NOTE — PROGRESS NOTE ADULT - ASSESSMENT
89 y/o M with h/o HTN, chol, LBBB, DM2, BPH, PVD, AS s/p valve replacement July 2015 recent 6 month vacation outside the country to [place] and [place] p/w cough with hemoptysis, fever, chills, and night sweats x3 days. Pt was feeling fine previously, began having a cough and some "sniffles" 4 days ago after gardening all day, 3 days ago began having a dry non-productive cough that progressed to a cough with hemoptysis described as bright red blood tinged sputum. Pt reports breathing quickly, +SOB. +myalgias/arthralgias 2d ago resolved with Bengay.     cap improved  - ceftriaxone  - azithro  - blood cultures  - ? need to r/o tb  - airborne isolation  - sputum for afb  - QuantiFeron gold negative  - pulm  and ID eval called    bhavya  - iv fluids  - check renal sono    abnormal lft's  - check liver sono  - trend LFT's    supplement phos    htn  - cw home meds    diabetes  - insulin sliding scale  - hgb a1c 10   - endo eval    dvt px 89 y/o M with h/o HTN, chol, LBBB, DM2, BPH, PVD, AS s/p valve replacement July 2015 recent 6 month vacation outside the country to [place] and [place] p/w cough with hemoptysis, fever, chills, and night sweats x3 days. Pt was feeling fine previously, began having a cough and some "sniffles" 4 days ago after gardening all day, 3 days ago began having a dry non-productive cough that progressed to a cough with hemoptysis described as bright red blood tinged sputum. Pt reports breathing quickly, +SOB. +myalgias/arthralgias 2d ago resolved with Bengay.     cap improved  - ceftriaxone  - azithro  - blood cultures  - ? need to r/o tb  - airborne isolation  - sputum for afb  - QuantiFeron gold negative  - pulm  and ID eval called    bhavya  - iv fluids  - check renal sono    abnormal lft's  - check liver sono  - trend LFT's  - stop lipitor    supplement phos    htn  - cw home meds    uncontrolled diabetes  - insulin sliding scale  - hgb a1c 10   - endo eval    dvt px

## 2018-06-18 NOTE — CONSULT NOTE ADULT - PROBLEM SELECTOR RECOMMENDATION 2
Continue antibiotics, primary team FU Partial Purse String (Simple) Text: Given the location of the defect and the characteristics of the surrounding skin a simple purse string closure was deemed most appropriate.  Undermining was performed circumfirentially around the surgical defect.  A purse string suture was then placed and tightened. Wound tension only allowed a partial closure of the circular defect.

## 2018-06-18 NOTE — CHART NOTE - NSCHARTNOTEFT_GEN_A_CORE
MEDICINE PA    Notified by RN patient with temperature 102.1F. Seen and examined patient at bedside. Patient is alert, NAD. Denies HA, CP, SOB, cough, N/V, or abd pain.    VITAL SIGNS:  T(C): 38.9 (06-18-18 @ 21:17), Max: 38.9 (06-18-18 @ 21:17)  HR: 84 (06-18-18 @ 21:17) (74 - 92)  BP: 137/62 (06-18-18 @ 21:17) (132/77 - 138/66)  RR: 18 (06-18-18 @ 21:17) (18 - 22)  SpO2: 93% (06-18-18 @ 21:17) (93% - 96%)    LABORATORY:                          11.0   8.51  )-----------( 218      ( 18 Jun 2018 08:00 )             33.0       06-18    136  |  102  |  26<H>  ----------------------------<  261<H>  3.5   |  18<L>  |  1.54<H>    Ca    8.7      18 Jun 2018 06:27  Phos  1.9     06-18  Mg     2.3     06-18    TPro  7.2  /  Alb  2.4<L>  /  TBili  0.6  /  DBili  0.2  /  AST  467<H>  /  ALT  284<H>  /  AlkPhos  125<H>  06-18      PHYSICAL EXAM:    Constitutional: AOx3. NAD.    Respiratory: clear lungs bilaterally. No wheezing, rhonchi, or crackles.    Cardiovascular: S1 S2. No murmurs.    Gastrointestinal: BS X4 active. soft. nontender.    Extremities/Vascular: +2 pulses bilaterally. No BLE edema.      ASSESSMENT/PLAN:   HPI:  89 y/o M with h/o HTN, chol, LBBB, DM2, BPH, PVD, AS s/p valve replacement July 2015 recent 6 month vacation outside the country to [place] and [facility] p/w cough with hemoptysis, fever, chills, and night sweats x3 days. Pt was feeling fine previously, began having a cough and some "sniffles" 4 days ago after gardening all day, 3 days ago began having a dry non-productive cough that progressed to a cough with hemoptysis described as bright red blood tinged sputum. Pt reports breathing quickly, +SOB. +myalgias/arthralgias 2d ago resolved with Bengay. No advil or tylenol since Thursday. Denies massive hemoptysis. Pt had no known TB exposure, no known prior h/o TB, no known prior PPD. 2 pack year remote smoking hx, quit 60 years ago. Pt denies prior episodes, recent/recurrent lung infections, respiratory dz, known h/o CA, wt loss, numbness, paresthesias, weakness, altered mental status, syncope, change in vision, chest pain, back pain, abdominal pain, n/v/d, recent leg pain or swelling, or sick contacts. (16 Jun 2018 16:58)        1) Fever; likely secondary to PNA  -tylenol and cooling measures prn for pyrexia  -BC x2, UA  -c/w IV abx  -Continue to monitor pt. throughout the night.  -F/U primary team in AM    -Hunter Moulton PA-C. #[id number].

## 2018-06-18 NOTE — CONSULT NOTE ADULT - ASSESSMENT
89 y/o M with h/o HTN, chol, LBBB, DM2, BPH, PVD, AS s/p valve replacement July 2015 recent 6 month vacation outside the country to [place] and [place] p/w cough with hemoptysis, fever, chills, and night sweats x3 days.   In ED febrile to 102 and requiring supplemental oxygen.  Labs with mild leukocytosis, MAYI Cr 1.7 and elevated LFT's. Imaging with multifocal pneumonia and small right pleural effusion. His symptoms are likely secondary to CAP, because of the acute nature low suspicion for TB    Recommend:  c/w ceftriaxone and azithromycin  Check legionella and sputum culture  Monitor LFT's  If legionella negative would DC azithromycin 89 y/o M with h/o HTN, chol, LBBB, DM2, BPH, PVD, AS s/p valve replacement July 2015 recent 6 month vacation outside the country to [place] and [place] p/w cough with hemoptysis, fever, chills, and night sweats x3 days.   In ED febrile to 102 and requiring supplemental oxygen.  Labs with mild leukocytosis, MAYI Cr 1.7 and elevated LFT's. Imaging with multifocal pneumonia and small right pleural effusion. His symptoms are likely secondary to CAP, because of the acute nature low suspicion for TB    Recommend:  c/w ceftriaxone and azithromycin  Check legionella and sputum culture  Monitor LFT's  \

## 2018-06-18 NOTE — CONSULT NOTE ADULT - ASSESSMENT
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

## 2018-06-18 NOTE — CONSULT NOTE ADULT - SUBJECTIVE AND OBJECTIVE BOX
HPI:  89 y/o M with h/o HTN, chol, LBBB, DM2, BPH, PVD, AS s/p valve replacement July 2015 recent 6 month vacation outside the country to [place] and [facility] p/w cough with hemoptysis, fever, chills, and night sweats x3 days. Pt was feeling fine previously, began having a cough and some "sniffles" 4 days ago after gardening all day, 3 days ago began having a dry non-productive cough that progressed to a cough with hemoptysis described as bright red blood tinged sputum. Pt reports breathing quickly, +SOB. +myalgias/arthralgias 2d ago resolved with Bengay. No advil or tylenol since Thursday. Denies massive hemoptysis. Pt had no known TB exposure, no known prior h/o TB, no known prior PPD. 2 pack year remote smoking hx, quit 60 years ago. Pt denies prior episodes, recent/recurrent lung infections, respiratory dz, known h/o CA, wt loss, numbness, paresthesias, weakness, altered mental status, syncope, change in vision, chest pain, back pain, abdominal pain, n/v/d, recent leg pain or swelling, or sick contacts. (16 Jun 2018 16:58)  Patient has history of diabetes, on oral meds at home, no recent hypoglycemic episodes, no polyuria polydipsia. Patient follows up with PCP for diabetes management.    PAST MEDICAL & SURGICAL HISTORY:  Vocal cord polyp: pending treatment diagnosed in 2014  BPH (benign prostatic hypertrophy)  PVD (peripheral vascular disease)  LBBB (left bundle branch block): incomplete  HTN (hypertension)  Hypercholesterolemia  DM2 (diabetes mellitus, type 2)  AS (aortic stenosis)  History of artificial heart valve      FAMILY HISTORY:  No pertinent family history in first degree relatives      Social History:    Outpatient Medications:    MEDICATIONS  (STANDING):  amLODIPine   Tablet 10 milliGRAM(s) Oral daily  azithromycin  IVPB 500 milliGRAM(s) IV Intermittent every 24 hours  cefTRIAXone   IVPB 1 Gram(s) IV Intermittent every 24 hours  dextrose 5%. 1000 milliLiter(s) (50 mL/Hr) IV Continuous <Continuous>  dextrose 50% Injectable 12.5 Gram(s) IV Push once  dextrose 50% Injectable 25 Gram(s) IV Push once  dextrose 50% Injectable 25 Gram(s) IV Push once  heparin  Injectable 5000 Unit(s) SubCutaneous every 12 hours  insulin glargine Injectable (LANTUS) 14 Unit(s) SubCutaneous at bedtime  insulin lispro (HumaLOG) corrective regimen sliding scale   SubCutaneous three times a day before meals  insulin lispro (HumaLOG) corrective regimen sliding scale   SubCutaneous at bedtime  insulin lispro Injectable (HumaLOG) 7 Unit(s) SubCutaneous three times a day before meals  lisinopril 10 milliGRAM(s) Oral daily  pantoprazole    Tablet 40 milliGRAM(s) Oral before breakfast  potassium phosphate IVPB 30 milliMole(s) IV Intermittent once  tamsulosin 0.4 milliGRAM(s) Oral at bedtime    MEDICATIONS  (PRN):  dextrose 40% Gel 15 Gram(s) Oral once PRN Blood Glucose LESS THAN 70 milliGRAM(s)/deciliter  glucagon  Injectable 1 milliGRAM(s) IntraMuscular once PRN Glucose LESS THAN 70 milligrams/deciliter      Allergies    Cipro (Pruritus; Rash)    Intolerances      Review of Systems:  Constitutional: No fever, no chills  Eyes: No blurry vision  Neuro: No tremors  HEENT: No pain, no neck swelling  Cardiovascular: No chest pain, no palpitations  Respiratory: Has SOB, no cough  GI: No nausea, vomiting, abdominal pain  : No dysuria  Skin: no rash  MSK: Has leg swelling.  Psych: no depression  Endocrine: no polyuria, polydipsia    ALL OTHER SYSTEMS REVIEWED AND NEGATIVE    UNABLE TO OBTAIN    PHYSICAL EXAM:  VITALS: T(C): 37.6 (06-18-18 @ 05:24)  T(F): 99.6 (06-18-18 @ 05:24), Max: 99.6 (06-18-18 @ 05:24)  HR: 92 (06-18-18 @ 05:24) (74 - 93)  BP: 138/66 (06-18-18 @ 05:24) (132/77 - 149/64)  RR:  (18 - 35)  SpO2:  (95% - 99%)  Wt(kg): --  GENERAL: NAD, well-groomed, well-developed  EYES: No proptosis, no lid lag  HEENT:  Atraumatic, Normocephalic  THYROID: Normal size, no palpable nodules  RESPIRATORY: Clear to auscultation bilaterally; No rales, rhonchi, wheezing  CARDIOVASCULAR: Si S2, No murmurs;  GI: Soft, non distended, normal bowel sounds  SKIN: Dry, intact, No rashes or lesions  MUSCULOSKELETAL: Has BL lower extremity edema.  NEURO:  no tremor, sensation decreased in feet BL,    POCT Blood Glucose.: 251 mg/dL (06-18-18 @ 08:08)  POCT Blood Glucose.: 324 mg/dL (06-17-18 @ 21:34)  POCT Blood Glucose.: 284 mg/dL (06-17-18 @ 17:07)  POCT Blood Glucose.: 235 mg/dL (06-17-18 @ 12:08)  POCT Blood Glucose.: 193 mg/dL (06-17-18 @ 08:07)  POCT Blood Glucose.: 247 mg/dL (06-16-18 @ 21:15)  POCT Blood Glucose.: 277 mg/dL (06-16-18 @ 16:43)  POCT Blood Glucose.: 374 mg/dL (06-16-18 @ 14:14)                            11.0   8.51  )-----------( 218      ( 18 Jun 2018 08:00 )             33.0       06-18    136  |  102  |  26<H>  ----------------------------<  261<H>  3.5   |  18<L>  |  1.54<H>    EGFR if : 45<L>  EGFR if non : 39<L>    Ca    8.7      06-18  Mg     2.3     06-18  Phos  1.9     06-18    TPro  7.2  /  Alb  2.4<L>  /  TBili  0.6  /  DBili  0.2  /  AST  467<H>  /  ALT  284<H>  /  AlkPhos  125<H>  06-18      Thyroid Function Tests:  06-17 @ 08:16 TSH 1.20 FreeT4 -- T3 -- Anti TPO -- Anti Thyroglobulin Ab -- TSI --      Hemoglobin A1C, Whole Blood: 10.0 % <H> [4.0 - 5.6] (06-17-18 @ 08:20)          Radiology:

## 2018-06-18 NOTE — CONSULT NOTE ADULT - ASSESSMENT
Assessment  DMT2: 89y Male with DM T2 with hyperglycemia on insulin, blood sugars running high, no hypoglycemic episode,  eating meals,  non compliant with low carb diet.  PNA: On medications, stable, monitored.  HTN: Controlled, On med.

## 2018-06-18 NOTE — CONSULT NOTE ADULT - PROBLEM SELECTOR RECOMMENDATION 9
Will start Lantus 15 units at bed time.  Will start Humalog 7 units before each meal in addition to Humalog correction scale coverage.  Patient counseled for compliance with consistent low carb diet.

## 2018-06-19 LAB
ALBUMIN SERPL ELPH-MCNC: 2.3 G/DL — LOW (ref 3.3–5)
ALP SERPL-CCNC: 151 U/L — HIGH (ref 40–120)
ALT FLD-CCNC: 448 U/L — HIGH (ref 10–45)
ANION GAP SERPL CALC-SCNC: 13 MMOL/L — SIGNIFICANT CHANGE UP (ref 5–17)
AST SERPL-CCNC: 526 U/L — HIGH (ref 10–40)
BILIRUB DIRECT SERPL-MCNC: 0.2 MG/DL — SIGNIFICANT CHANGE UP (ref 0–0.2)
BILIRUB INDIRECT FLD-MCNC: 0.5 MG/DL — SIGNIFICANT CHANGE UP (ref 0.2–1)
BILIRUB SERPL-MCNC: 0.7 MG/DL — SIGNIFICANT CHANGE UP (ref 0.2–1.2)
BUN SERPL-MCNC: 24 MG/DL — HIGH (ref 7–23)
CALCIUM SERPL-MCNC: 8.6 MG/DL — SIGNIFICANT CHANGE UP (ref 8.4–10.5)
CHLORIDE SERPL-SCNC: 101 MMOL/L — SIGNIFICANT CHANGE UP (ref 96–108)
CO2 SERPL-SCNC: 20 MMOL/L — LOW (ref 22–31)
CREAT SERPL-MCNC: 1.44 MG/DL — HIGH (ref 0.5–1.3)
GLUCOSE BLDC GLUCOMTR-MCNC: 141 MG/DL — HIGH (ref 70–99)
GLUCOSE BLDC GLUCOMTR-MCNC: 171 MG/DL — HIGH (ref 70–99)
GLUCOSE BLDC GLUCOMTR-MCNC: 172 MG/DL — HIGH (ref 70–99)
GLUCOSE BLDC GLUCOMTR-MCNC: 211 MG/DL — HIGH (ref 70–99)
GLUCOSE SERPL-MCNC: 189 MG/DL — HIGH (ref 70–99)
HAV IGM SER-ACNC: SIGNIFICANT CHANGE UP
HBV CORE IGM SER-ACNC: SIGNIFICANT CHANGE UP
HBV SURFACE AG SER-ACNC: SIGNIFICANT CHANGE UP
HCT VFR BLD CALC: 31.9 % — LOW (ref 39–50)
HCV AB S/CO SERPL IA: 0.12 S/CO — SIGNIFICANT CHANGE UP
HCV AB SERPL-IMP: SIGNIFICANT CHANGE UP
HGB BLD-MCNC: 11 G/DL — LOW (ref 13–17)
MAGNESIUM SERPL-MCNC: 2.1 MG/DL — SIGNIFICANT CHANGE UP (ref 1.6–2.6)
MCHC RBC-ENTMCNC: 28.3 PG — SIGNIFICANT CHANGE UP (ref 27–34)
MCHC RBC-ENTMCNC: 34.5 GM/DL — SIGNIFICANT CHANGE UP (ref 32–36)
MCV RBC AUTO: 82 FL — SIGNIFICANT CHANGE UP (ref 80–100)
NIGHT BLUE STAIN TISS: SIGNIFICANT CHANGE UP
PHOSPHATE SERPL-MCNC: 2.6 MG/DL — SIGNIFICANT CHANGE UP (ref 2.5–4.5)
PLATELET # BLD AUTO: 241 K/UL — SIGNIFICANT CHANGE UP (ref 150–400)
POTASSIUM SERPL-MCNC: 3.7 MMOL/L — SIGNIFICANT CHANGE UP (ref 3.5–5.3)
POTASSIUM SERPL-SCNC: 3.7 MMOL/L — SIGNIFICANT CHANGE UP (ref 3.5–5.3)
PROT SERPL-MCNC: 6.9 G/DL — SIGNIFICANT CHANGE UP (ref 6–8.3)
RBC # BLD: 3.89 M/UL — LOW (ref 4.2–5.8)
RBC # FLD: 15.7 % — HIGH (ref 10.3–14.5)
SODIUM SERPL-SCNC: 134 MMOL/L — LOW (ref 135–145)
SPECIMEN SOURCE: SIGNIFICANT CHANGE UP
WBC # BLD: 7.36 K/UL — SIGNIFICANT CHANGE UP (ref 3.8–10.5)
WBC # FLD AUTO: 7.36 K/UL — SIGNIFICANT CHANGE UP (ref 3.8–10.5)

## 2018-06-19 PROCEDURE — 74183 MRI ABD W/O CNTR FLWD CNTR: CPT | Mod: 26

## 2018-06-19 PROCEDURE — 99233 SBSQ HOSP IP/OBS HIGH 50: CPT

## 2018-06-19 PROCEDURE — 71045 X-RAY EXAM CHEST 1 VIEW: CPT | Mod: 26

## 2018-06-19 PROCEDURE — 99232 SBSQ HOSP IP/OBS MODERATE 35: CPT

## 2018-06-19 RX ORDER — ACETAMINOPHEN 500 MG
650 TABLET ORAL ONCE
Qty: 0 | Refills: 0 | Status: COMPLETED | OUTPATIENT
Start: 2018-06-19 | End: 2018-06-19

## 2018-06-19 RX ORDER — AMPICILLIN SODIUM AND SULBACTAM SODIUM 250; 125 MG/ML; MG/ML
1.5 INJECTION, POWDER, FOR SUSPENSION INTRAMUSCULAR; INTRAVENOUS EVERY 6 HOURS
Qty: 0 | Refills: 0 | Status: DISCONTINUED | OUTPATIENT
Start: 2018-06-19 | End: 2018-06-23

## 2018-06-19 RX ADMIN — Medication 2: at 12:05

## 2018-06-19 RX ADMIN — AMPICILLIN SODIUM AND SULBACTAM SODIUM 100 GRAM(S): 250; 125 INJECTION, POWDER, FOR SUSPENSION INTRAMUSCULAR; INTRAVENOUS at 20:17

## 2018-06-19 RX ADMIN — Medication 8 UNIT(S): at 12:05

## 2018-06-19 RX ADMIN — INSULIN GLARGINE 14 UNIT(S): 100 INJECTION, SOLUTION SUBCUTANEOUS at 21:37

## 2018-06-19 RX ADMIN — Medication 8 UNIT(S): at 08:14

## 2018-06-19 RX ADMIN — AMPICILLIN SODIUM AND SULBACTAM SODIUM 100 GRAM(S): 250; 125 INJECTION, POWDER, FOR SUSPENSION INTRAMUSCULAR; INTRAVENOUS at 14:05

## 2018-06-19 RX ADMIN — Medication 1: at 17:00

## 2018-06-19 RX ADMIN — HEPARIN SODIUM 5000 UNIT(S): 5000 INJECTION INTRAVENOUS; SUBCUTANEOUS at 17:01

## 2018-06-19 RX ADMIN — PANTOPRAZOLE SODIUM 40 MILLIGRAM(S): 20 TABLET, DELAYED RELEASE ORAL at 06:00

## 2018-06-19 RX ADMIN — Medication 650 MILLIGRAM(S): at 21:37

## 2018-06-19 RX ADMIN — AMLODIPINE BESYLATE 10 MILLIGRAM(S): 2.5 TABLET ORAL at 05:59

## 2018-06-19 RX ADMIN — AZITHROMYCIN 250 MILLIGRAM(S): 500 TABLET, FILM COATED ORAL at 16:59

## 2018-06-19 RX ADMIN — Medication 8 UNIT(S): at 17:00

## 2018-06-19 RX ADMIN — LISINOPRIL 10 MILLIGRAM(S): 2.5 TABLET ORAL at 06:00

## 2018-06-19 RX ADMIN — Medication 1: at 08:14

## 2018-06-19 RX ADMIN — HEPARIN SODIUM 5000 UNIT(S): 5000 INJECTION INTRAVENOUS; SUBCUTANEOUS at 06:00

## 2018-06-19 RX ADMIN — TAMSULOSIN HYDROCHLORIDE 0.4 MILLIGRAM(S): 0.4 CAPSULE ORAL at 21:37

## 2018-06-19 NOTE — DIETITIAN INITIAL EVALUATION ADULT. - ORAL INTAKE PTA
Pt eats very well at home, consuming 3 balanced meals daily, with food preparation assistance from family and friends. Pt takes no nutrition supplements; reports NKFA./good

## 2018-06-19 NOTE — PROGRESS NOTE ADULT - ASSESSMENT
Assessment  DMT2: 89y Male with DM T2 with hyperglycemia on insulin, blood sugars improving, no hypoglycemic episode,  eating meals, compliant with low carb diet.  PNA: On medications, stable, monitored.  HTN: Controlled, On med.

## 2018-06-19 NOTE — PROGRESS NOTE ADULT - ASSESSMENT
Multilobar community acquired pneumonia  FEbrile, yet clinically improved and non toxic  Small R parapneumonic effusion on admission CT    REC    Contintue abx per ID  Repeat CXR today to re-assess effusion  Check sats on RA

## 2018-06-19 NOTE — CHART NOTE - NSCHARTNOTEFT_GEN_A_CORE
Called by RN for fever 101. 1. Pt seen and examined at bedside. Pt is A&O x3. Pt reports feeling chills earlier. Denies any chest pain, SOB, palpitations, abdominal pain at this time. Pt report being able to tolerate good meal today. Pt states has good BM yesterday. +passing gas.     Vital Signs Last 24 Hrs  T(C): 38.4 (19 Jun 2018 20:57), Max: 38.4 (19 Jun 2018 20:57)  T(F): 101.1 (19 Jun 2018 20:57), Max: 101.1 (19 Jun 2018 20:57)  HR: 84 (19 Jun 2018 20:57) (84 - 93)  BP: 153/63 (19 Jun 2018 20:57) (152/71 - 153/63)  BP(mean): --  RR: 18 (19 Jun 2018 20:57) (18 - 18)  SpO2: 98% (19 Jun 2018 20:57) (93% - 98%)                        11.0   7.36  )-----------( 241      ( 19 Jun 2018 08:11 )             31.9     06-19    134<L>  |  101  |  24<H>  ----------------------------<  189<H>  3.7   |  20<L>  |  1.44<H>    Ca    8.6      19 Jun 2018 06:34  Phos  2.6     06-19  Mg     2.1     06-19    TPro  6.9  /  Alb  2.3<L>  /  TBili  0.7  /  DBili  0.2  /  AST  526<H>  /  ALT  448<H>  /  AlkPhos  151<H>  06-19    .Sputum Sputum,conical  06-18-18 --  --  --      .Sputum Sputum  06-17-18 --  --  --      .Blood Blood  06-16-18   No growth to date.  --  --          REVIEW OF SYSTEMS:    CONSTITUTIONAL: No weakness, fevers. +CHILLS tonight   EYES/ENT: No visual changes;  No vertigo or throat pain   NECK: No pain or stiffness  RESPIRATORY: No cough, wheezing, hemoptysis; No shortness of breath  CARDIOVASCULAR: No chest pain or palpitations  GASTROINTESTINAL: No abdominal or epigastric pain. No nausea, vomiting, or hematemesis; No diarrhea or constipation. No melena or hematochezia.  GENITOURINARY: No dysuria, frequency or hematuria  NEUROLOGICAL: No numbness or weakness  SKIN: No itching, rashes      PHYSICAL EXAM:     General: NAD, non-toxic appearance  Neuro: NC, AT, PERRLA, no focal deficits  CV: S1 S2 RRR  Resp: B/L Lungs CTA, nonlabored  Abd: soft, NT,  +large abdomen/distended but soft,  + BS X4 quadrants  Ext: no edema, +PP b/l LE, warm to touch     Assessment & Plan     89 y/o M with h/o HTN, chol, LBBB, DM2, BPH, PVD, AS s/p valve replacement July 2015 recent 6 month vacation outside the country to [place] and [place] p/w cough with hemoptysis, fever, chills, and night sweats x3 days. Pt was admitted for PNA, being treated with Unasyn and Azithromycin.     Pt now with fever likely due to PNA.     Continue current antimicrobials  Tylenol 650mg po x 1   Follow up blood cx x2 sent yesterday 6/18 6/18 UA - negative     F/U with primary team in am    Corazon Cagle, ANP-BC  [id number]

## 2018-06-19 NOTE — DIETITIAN INITIAL EVALUATION ADULT. - ADHERENCE
Pt reports that he takes medication for DM2, but unable to recall name or dose. Pt checks fingersticks once every 2 days, reports BG ranges from 110-190mg/dL. HbA1c 10% indicates poor blood glucose control. Pt eats a diet high in salt, saturated fat and carbohydrate. Pt typically eats banana, juice, sausage and egg for breakfast, oxtail, rice, peas and plantains for lunch, and fried fish, potatoes and green beans for dinner. Pt appears unaware of sugar or carbohydrate content of foods./poor

## 2018-06-19 NOTE — PROGRESS NOTE ADULT - ASSESSMENT
89 y/o M with h/o HTN, chol, LBBB, DM2, BPH, PVD, AS s/p valve replacement July 2015 recent 6 month vacation outside the country to [place] and [place] p/w cough with hemoptysis, fever, chills, and night sweats x3 days. Pt was feeling fine previously, began having a cough and some "sniffles" 4 days ago after gardening all day, 3 days ago began having a dry non-productive cough that progressed to a cough with hemoptysis described as bright red blood tinged sputum. Pt reports breathing quickly, +SOB. +myalgias/arthralgias 2d ago resolved with Bengay.     cap improved  - ceftriaxone  - azithro  - blood cultures  -  tb unlikely  - sputum for afb negative  - QuantiFeron gold negative  - pulm  and ID eval  appreciated    bhavya  - iv fluids  - renal sono did not show hydro    abnormal lft's rising  - liver sono did not show obstruction  - trend LFT's  - stoped lipitor  - hepatitis profile       htn  - cw home meds    uncontrolled diabetes  - insulin sliding scale  - hgb a1c 10   - endo eval noted    dvt px

## 2018-06-19 NOTE — DIETITIAN INITIAL EVALUATION ADULT. - PERTINENT LABORATORY DATA
HbA1c 10%, Fingersticks x 24 hours: 171-251mg/dL, sodium 134 <L>, BUN 24, creatinine 1.44, alkaline phosphase 151 <H>,  <H>,  <H>

## 2018-06-19 NOTE — DIETITIAN INITIAL EVALUATION ADULT. - FACTORS AFF FOOD INTAKE
Jew/ethnic/cultural/personal food preferences/Pt reports reduced appetite in-house and difficulty adjusting to non-Neeraj food, however ate a salad with chicken for lunch yesterday. Pt is receiving menu assistance from his son. Pt denies GI distress; last BM 6/18.

## 2018-06-19 NOTE — PROGRESS NOTE ADULT - ASSESSMENT
89 yo with fevers  Likely CAP  Also ? chronic liver cyst with hemorrahge into it  Transaminitis  Clinically somewhat better  Labs with rising transaminases  Hep serology negative -no other foci  Doubt liver lesion abscess/infected but would rec MRI to better assess  Follow Cx  CHnage ceftriaxone to unasyn in view of above  Will tailor plan for ID issues  per course,results.Will defer to primary team on management of other issues.  case d/w  med NP  Will Follow.  Beeper [id number]-ctxj/afterhours/No response-[id number]

## 2018-06-19 NOTE — DIETITIAN INITIAL EVALUATION ADULT. - OTHER INFO
Nutrition Consult for education received and appreciated. Pt with h/o HTN, chol, LBBB, DM2, BPH, PVD, AS S/P valve replacement July 2015, admitted with cough and fever. Endocrine consult 6/18 noted. Rising abnormal LFTs noted.

## 2018-06-19 NOTE — DIETITIAN INITIAL EVALUATION ADULT. - NS AS NUTRI INTERV MEALS SNACK
Continue Consistent Carbohydrate, DASH diet; monitor need for evening snack on Lantus. RD will add sugar-free Health Shake to dinner meal tray./Carbohydrate - modified diet

## 2018-06-19 NOTE — DIETITIAN INITIAL EVALUATION ADULT. - ENERGY NEEDS
ht: 5 feet 5 inches, wt: 160 pounds, BMI: 26.6 Kg/m2, IBW: 136 pounds (+/- 10%), 118% IBW. Edema: none noted; Skin: no pressure injuries.

## 2018-06-19 NOTE — DIETITIAN INITIAL EVALUATION ADULT. - NS AS NUTRI INTERV ED CONTENT
Nutrition relationship to health/disease/Reviewed Consistent Carbohydrate diet, including carbohydrate content of food and portion size of carbohydrate foods. Reviewed recommendations to limit sodium and saturated fat for cardiac health. Pt was receptive and accepted written diet education handouts: Type 2 Diabetes Nutrition Therapy; Heart Healthy Consistent Carbohydrate Nutrition Therapy; Using Nutrition Labels-Carbohydrates.

## 2018-06-20 LAB
ALBUMIN SERPL ELPH-MCNC: 2.1 G/DL — LOW (ref 3.3–5)
ALP SERPL-CCNC: 153 U/L — HIGH (ref 40–120)
ALT FLD-CCNC: 279 U/L — HIGH (ref 10–45)
ANION GAP SERPL CALC-SCNC: 17 MMOL/L — SIGNIFICANT CHANGE UP (ref 5–17)
AST SERPL-CCNC: 181 U/L — HIGH (ref 10–40)
BASOPHILS # BLD AUTO: 0.02 K/UL — SIGNIFICANT CHANGE UP (ref 0–0.2)
BASOPHILS NFR BLD AUTO: 0.3 % — SIGNIFICANT CHANGE UP (ref 0–2)
BILIRUB DIRECT SERPL-MCNC: 0.2 MG/DL — SIGNIFICANT CHANGE UP (ref 0–0.2)
BILIRUB INDIRECT FLD-MCNC: 0.6 MG/DL — SIGNIFICANT CHANGE UP (ref 0.2–1)
BILIRUB SERPL-MCNC: 0.8 MG/DL — SIGNIFICANT CHANGE UP (ref 0.2–1.2)
BUN SERPL-MCNC: 21 MG/DL — SIGNIFICANT CHANGE UP (ref 7–23)
CALCIUM SERPL-MCNC: 8.5 MG/DL — SIGNIFICANT CHANGE UP (ref 8.4–10.5)
CHLORIDE SERPL-SCNC: 102 MMOL/L — SIGNIFICANT CHANGE UP (ref 96–108)
CO2 SERPL-SCNC: 18 MMOL/L — LOW (ref 22–31)
CREAT SERPL-MCNC: 1.28 MG/DL — SIGNIFICANT CHANGE UP (ref 0.5–1.3)
EOSINOPHIL # BLD AUTO: 0.28 K/UL — SIGNIFICANT CHANGE UP (ref 0–0.5)
EOSINOPHIL NFR BLD AUTO: 3.5 % — SIGNIFICANT CHANGE UP (ref 0–6)
GLUCOSE BLDC GLUCOMTR-MCNC: 111 MG/DL — HIGH (ref 70–99)
GLUCOSE BLDC GLUCOMTR-MCNC: 127 MG/DL — HIGH (ref 70–99)
GLUCOSE BLDC GLUCOMTR-MCNC: 187 MG/DL — HIGH (ref 70–99)
GLUCOSE BLDC GLUCOMTR-MCNC: 216 MG/DL — HIGH (ref 70–99)
GLUCOSE SERPL-MCNC: 126 MG/DL — HIGH (ref 70–99)
HCT VFR BLD CALC: 33.6 % — LOW (ref 39–50)
HGB BLD-MCNC: 11.1 G/DL — LOW (ref 13–17)
IMM GRANULOCYTES NFR BLD AUTO: 1.3 % — SIGNIFICANT CHANGE UP (ref 0–1.5)
LEGIONELLA AG UR QL: NEGATIVE — SIGNIFICANT CHANGE UP
LYMPHOCYTES # BLD AUTO: 0.64 K/UL — LOW (ref 1–3.3)
LYMPHOCYTES # BLD AUTO: 8.1 % — LOW (ref 13–44)
MAGNESIUM SERPL-MCNC: 1.9 MG/DL — SIGNIFICANT CHANGE UP (ref 1.6–2.6)
MCHC RBC-ENTMCNC: 27.4 PG — SIGNIFICANT CHANGE UP (ref 27–34)
MCHC RBC-ENTMCNC: 33 GM/DL — SIGNIFICANT CHANGE UP (ref 32–36)
MCV RBC AUTO: 83 FL — SIGNIFICANT CHANGE UP (ref 80–100)
MONOCYTES # BLD AUTO: 0.84 K/UL — SIGNIFICANT CHANGE UP (ref 0–0.9)
MONOCYTES NFR BLD AUTO: 10.6 % — SIGNIFICANT CHANGE UP (ref 2–14)
NEUTROPHILS # BLD AUTO: 6.02 K/UL — SIGNIFICANT CHANGE UP (ref 1.8–7.4)
NEUTROPHILS NFR BLD AUTO: 76.2 % — SIGNIFICANT CHANGE UP (ref 43–77)
PHOSPHATE SERPL-MCNC: 2.8 MG/DL — SIGNIFICANT CHANGE UP (ref 2.5–4.5)
PLATELET # BLD AUTO: 276 K/UL — SIGNIFICANT CHANGE UP (ref 150–400)
POTASSIUM SERPL-MCNC: 3.8 MMOL/L — SIGNIFICANT CHANGE UP (ref 3.5–5.3)
POTASSIUM SERPL-SCNC: 3.8 MMOL/L — SIGNIFICANT CHANGE UP (ref 3.5–5.3)
PROT SERPL-MCNC: 7 G/DL — SIGNIFICANT CHANGE UP (ref 6–8.3)
RBC # BLD: 4.05 M/UL — LOW (ref 4.2–5.8)
RBC # FLD: 15.5 % — HIGH (ref 10.3–14.5)
SODIUM SERPL-SCNC: 137 MMOL/L — SIGNIFICANT CHANGE UP (ref 135–145)
WBC # BLD: 7.9 K/UL — SIGNIFICANT CHANGE UP (ref 3.8–10.5)
WBC # FLD AUTO: 7.9 K/UL — SIGNIFICANT CHANGE UP (ref 3.8–10.5)

## 2018-06-20 PROCEDURE — 99233 SBSQ HOSP IP/OBS HIGH 50: CPT

## 2018-06-20 RX ADMIN — AZITHROMYCIN 250 MILLIGRAM(S): 500 TABLET, FILM COATED ORAL at 18:14

## 2018-06-20 RX ADMIN — HEPARIN SODIUM 5000 UNIT(S): 5000 INJECTION INTRAVENOUS; SUBCUTANEOUS at 05:55

## 2018-06-20 RX ADMIN — TAMSULOSIN HYDROCHLORIDE 0.4 MILLIGRAM(S): 0.4 CAPSULE ORAL at 22:01

## 2018-06-20 RX ADMIN — AMPICILLIN SODIUM AND SULBACTAM SODIUM 100 GRAM(S): 250; 125 INJECTION, POWDER, FOR SUSPENSION INTRAMUSCULAR; INTRAVENOUS at 12:17

## 2018-06-20 RX ADMIN — AMLODIPINE BESYLATE 10 MILLIGRAM(S): 2.5 TABLET ORAL at 05:55

## 2018-06-20 RX ADMIN — LISINOPRIL 10 MILLIGRAM(S): 2.5 TABLET ORAL at 05:55

## 2018-06-20 RX ADMIN — HEPARIN SODIUM 5000 UNIT(S): 5000 INJECTION INTRAVENOUS; SUBCUTANEOUS at 17:04

## 2018-06-20 RX ADMIN — AMPICILLIN SODIUM AND SULBACTAM SODIUM 100 GRAM(S): 250; 125 INJECTION, POWDER, FOR SUSPENSION INTRAMUSCULAR; INTRAVENOUS at 01:07

## 2018-06-20 RX ADMIN — INSULIN GLARGINE 14 UNIT(S): 100 INJECTION, SOLUTION SUBCUTANEOUS at 22:01

## 2018-06-20 RX ADMIN — Medication 1: at 17:03

## 2018-06-20 RX ADMIN — AMPICILLIN SODIUM AND SULBACTAM SODIUM 100 GRAM(S): 250; 125 INJECTION, POWDER, FOR SUSPENSION INTRAMUSCULAR; INTRAVENOUS at 17:05

## 2018-06-20 RX ADMIN — PANTOPRAZOLE SODIUM 40 MILLIGRAM(S): 20 TABLET, DELAYED RELEASE ORAL at 05:55

## 2018-06-20 RX ADMIN — AMPICILLIN SODIUM AND SULBACTAM SODIUM 100 GRAM(S): 250; 125 INJECTION, POWDER, FOR SUSPENSION INTRAMUSCULAR; INTRAVENOUS at 05:55

## 2018-06-20 RX ADMIN — Medication 8 UNIT(S): at 08:11

## 2018-06-20 RX ADMIN — Medication 8 UNIT(S): at 12:17

## 2018-06-20 RX ADMIN — Medication 8 UNIT(S): at 17:03

## 2018-06-20 NOTE — PROGRESS NOTE ADULT - ASSESSMENT
89 y/o M with h/o HTN, chol, LBBB, DM2, BPH, PVD, AS s/p valve replacement July 2015 recent 6 month vacation outside the country to [place] and [place] p/w cough with hemoptysis, fever, chills, and night sweats x3 days. Pt was feeling fine previously, began having a cough and some "sniffles" 4 days ago after gardening all day, 3 days ago began having a dry non-productive cough that progressed to a cough with hemoptysis described as bright red blood tinged sputum. Pt reports breathing quickly, +SOB. +myalgias/arthralgias 2d ago resolved with Bengay.     pneumonia with recurrent fevers  - abx changed to unasyn per ID  - follow up blood cultures  -  tb unlikely  - sputum for afb negative  - QuantiFeron gold negative  - pulm  and ID eval  appreciated    bhavya improved  - iv fluids  - renal sono did not show hydro    abnormal lft's   - liver sono did not show obstruction  - trend LFT's  - stoped lipitor  - hepatitis profile negative  - follow up MRI    htn  - cw home meds    uncontrolled diabetes  - insulin sliding scale  - hgb a1c 10   - endo eval noted    dvt px

## 2018-06-20 NOTE — PROGRESS NOTE ADULT - ASSESSMENT
89 yo with fevers  Likely CAP  Also with chronic liver cyst-MRI no hemorrahge  Transaminitis-resolving   Clinically somewhat better  No other foci  Still had fevers but overall improving  Would rec continue current coverage  If fevers persist beyond 48 hrs CT chest to r/o complication  Else Will tailor plan for ID issues  per course,results.Will defer to primary team on management of other issues.  Case d/w Pulm(Dr Jackson)  I am away tomorrow,Infectious Diseases Service will cover .  Please call [phone] if issues

## 2018-06-20 NOTE — PROGRESS NOTE ADULT - ASSESSMENT
Multilobar community acquired pneumonia  FEbrile, yet clinically improved and non toxic  Small R parapneumonic effusion on admission CT    REC    Contintue abx per ID  Check PBNP

## 2018-06-21 LAB
ALBUMIN SERPL ELPH-MCNC: 2.4 G/DL — LOW (ref 3.3–5)
ALP SERPL-CCNC: 151 U/L — HIGH (ref 40–120)
ALT FLD-CCNC: 207 U/L — HIGH (ref 10–45)
ANION GAP SERPL CALC-SCNC: 13 MMOL/L — SIGNIFICANT CHANGE UP (ref 5–17)
AST SERPL-CCNC: 115 U/L — HIGH (ref 10–40)
BILIRUB SERPL-MCNC: 0.6 MG/DL — SIGNIFICANT CHANGE UP (ref 0.2–1.2)
BUN SERPL-MCNC: 16 MG/DL — SIGNIFICANT CHANGE UP (ref 7–23)
CALCIUM SERPL-MCNC: 8.4 MG/DL — SIGNIFICANT CHANGE UP (ref 8.4–10.5)
CHLORIDE SERPL-SCNC: 102 MMOL/L — SIGNIFICANT CHANGE UP (ref 96–108)
CO2 SERPL-SCNC: 23 MMOL/L — SIGNIFICANT CHANGE UP (ref 22–31)
CREAT SERPL-MCNC: 1.3 MG/DL — SIGNIFICANT CHANGE UP (ref 0.5–1.3)
CULTURE RESULTS: SIGNIFICANT CHANGE UP
CULTURE RESULTS: SIGNIFICANT CHANGE UP
GLUCOSE BLDC GLUCOMTR-MCNC: 117 MG/DL — HIGH (ref 70–99)
GLUCOSE BLDC GLUCOMTR-MCNC: 161 MG/DL — HIGH (ref 70–99)
GLUCOSE BLDC GLUCOMTR-MCNC: 176 MG/DL — HIGH (ref 70–99)
GLUCOSE BLDC GLUCOMTR-MCNC: 203 MG/DL — HIGH (ref 70–99)
GLUCOSE SERPL-MCNC: 120 MG/DL — HIGH (ref 70–99)
HCT VFR BLD CALC: 30.2 % — LOW (ref 39–50)
HGB BLD-MCNC: 9.7 G/DL — LOW (ref 13–17)
MCHC RBC-ENTMCNC: 26.8 PG — LOW (ref 27–34)
MCHC RBC-ENTMCNC: 32.1 GM/DL — SIGNIFICANT CHANGE UP (ref 32–36)
MCV RBC AUTO: 83.4 FL — SIGNIFICANT CHANGE UP (ref 80–100)
NT-PROBNP SERPL-SCNC: 1180 PG/ML — HIGH (ref 0–300)
PLATELET # BLD AUTO: 298 K/UL — SIGNIFICANT CHANGE UP (ref 150–400)
POTASSIUM SERPL-MCNC: 3.4 MMOL/L — LOW (ref 3.5–5.3)
POTASSIUM SERPL-SCNC: 3.4 MMOL/L — LOW (ref 3.5–5.3)
PROT SERPL-MCNC: 6.7 G/DL — SIGNIFICANT CHANGE UP (ref 6–8.3)
RBC # BLD: 3.62 M/UL — LOW (ref 4.2–5.8)
RBC # FLD: 15.4 % — HIGH (ref 10.3–14.5)
SODIUM SERPL-SCNC: 138 MMOL/L — SIGNIFICANT CHANGE UP (ref 135–145)
SPECIMEN SOURCE: SIGNIFICANT CHANGE UP
SPECIMEN SOURCE: SIGNIFICANT CHANGE UP
WBC # BLD: 9.18 K/UL — SIGNIFICANT CHANGE UP (ref 3.8–10.5)
WBC # FLD AUTO: 9.18 K/UL — SIGNIFICANT CHANGE UP (ref 3.8–10.5)

## 2018-06-21 PROCEDURE — 71045 X-RAY EXAM CHEST 1 VIEW: CPT | Mod: 26

## 2018-06-21 PROCEDURE — 99232 SBSQ HOSP IP/OBS MODERATE 35: CPT | Mod: GC

## 2018-06-21 RX ORDER — POTASSIUM CHLORIDE 20 MEQ
40 PACKET (EA) ORAL ONCE
Qty: 0 | Refills: 0 | Status: COMPLETED | OUTPATIENT
Start: 2018-06-21 | End: 2018-06-21

## 2018-06-21 RX ADMIN — Medication 8 UNIT(S): at 08:25

## 2018-06-21 RX ADMIN — Medication 1: at 12:29

## 2018-06-21 RX ADMIN — Medication 1: at 17:19

## 2018-06-21 RX ADMIN — Medication 8 UNIT(S): at 12:31

## 2018-06-21 RX ADMIN — PANTOPRAZOLE SODIUM 40 MILLIGRAM(S): 20 TABLET, DELAYED RELEASE ORAL at 05:18

## 2018-06-21 RX ADMIN — AMPICILLIN SODIUM AND SULBACTAM SODIUM 100 GRAM(S): 250; 125 INJECTION, POWDER, FOR SUSPENSION INTRAMUSCULAR; INTRAVENOUS at 18:25

## 2018-06-21 RX ADMIN — TAMSULOSIN HYDROCHLORIDE 0.4 MILLIGRAM(S): 0.4 CAPSULE ORAL at 21:08

## 2018-06-21 RX ADMIN — Medication 8 UNIT(S): at 17:19

## 2018-06-21 RX ADMIN — AMPICILLIN SODIUM AND SULBACTAM SODIUM 100 GRAM(S): 250; 125 INJECTION, POWDER, FOR SUSPENSION INTRAMUSCULAR; INTRAVENOUS at 12:30

## 2018-06-21 RX ADMIN — AZITHROMYCIN 250 MILLIGRAM(S): 500 TABLET, FILM COATED ORAL at 17:20

## 2018-06-21 RX ADMIN — INSULIN GLARGINE 14 UNIT(S): 100 INJECTION, SOLUTION SUBCUTANEOUS at 21:55

## 2018-06-21 RX ADMIN — AMPICILLIN SODIUM AND SULBACTAM SODIUM 100 GRAM(S): 250; 125 INJECTION, POWDER, FOR SUSPENSION INTRAMUSCULAR; INTRAVENOUS at 00:08

## 2018-06-21 RX ADMIN — AMPICILLIN SODIUM AND SULBACTAM SODIUM 100 GRAM(S): 250; 125 INJECTION, POWDER, FOR SUSPENSION INTRAMUSCULAR; INTRAVENOUS at 05:18

## 2018-06-21 RX ADMIN — HEPARIN SODIUM 5000 UNIT(S): 5000 INJECTION INTRAVENOUS; SUBCUTANEOUS at 05:18

## 2018-06-21 RX ADMIN — AMPICILLIN SODIUM AND SULBACTAM SODIUM 100 GRAM(S): 250; 125 INJECTION, POWDER, FOR SUSPENSION INTRAMUSCULAR; INTRAVENOUS at 23:27

## 2018-06-21 RX ADMIN — LISINOPRIL 10 MILLIGRAM(S): 2.5 TABLET ORAL at 05:18

## 2018-06-21 RX ADMIN — HEPARIN SODIUM 5000 UNIT(S): 5000 INJECTION INTRAVENOUS; SUBCUTANEOUS at 18:25

## 2018-06-21 RX ADMIN — Medication 40 MILLIEQUIVALENT(S): at 14:45

## 2018-06-21 RX ADMIN — AMLODIPINE BESYLATE 10 MILLIGRAM(S): 2.5 TABLET ORAL at 05:18

## 2018-06-21 NOTE — PHYSICAL THERAPY INITIAL EVALUATION ADULT - PERTINENT HX OF CURRENT PROBLEM, REHAB EVAL
Pt is a 89 y/o M with h/o HTN, chol, LBBB, DM2, BPH, PVD, AS s/p valve replacement July 2015 recent 6 month vacation outside the country to [place] p/w cough with hemoptysis, fever, chills, and night sweats x3 days. Pt was feeling fine previously, began having a cough and some "sniffles" 4 days ago after gardening all day, 3 days ago began having a dry non-productive cough that progressed to a cough with hemoptysis described as bright red blood tinged sputum. Pt is a 89 y/o M with h/o HTN, chol, LBBB, DM2, BPH, PVD, AS s/p valve replacement July 2015 recent 6 month vacation outside the country to [place] p/w cough with hemoptysis, fever, chills, and night sweats x3 days. Pt was feeling fine previously, began having a cough and some "sniffles" 4 days ago after gardening all day, 3 days ago began having a dry non-productive cough that progressed to a cough with hemoptysis described as bright red blood tinged sputum. Continued...

## 2018-06-21 NOTE — PHYSICAL THERAPY INITIAL EVALUATION ADULT - LIVES WITH, PROFILE
Pt lives in a private house with spouse. Pt reports ~ 4 stairs to enter with bilateral railings, bedroom/bathroom in mail floor of house.

## 2018-06-21 NOTE — PHYSICAL THERAPY INITIAL EVALUATION ADULT - PLANNED THERAPY INTERVENTIONS, PT EVAL
balance training/Stair Negotiation Training: GOAL- Patient will be able to negotiate up & down 1 flight of stairs independently with bilateral rails, step to gait pattern, in 2 weeks./gait training/strengthening/transfer training

## 2018-06-21 NOTE — PHYSICAL THERAPY INITIAL EVALUATION ADULT - DISCHARGE DISPOSITION, PT EVAL
Home PT for strength/balance training, progression of gait. Pt adamantly declines despite recommendation, reports his spouse will assist him as needed. If pt is dcd home, pt will require assist with all functional activities- pt's spouse agreeable to assist pt as recommended by PT.

## 2018-06-21 NOTE — PHYSICAL THERAPY INITIAL EVALUATION ADULT - ASSISTIVE DEVICE:SUPINE/SIT, REHAB EVAL
Chief Complaint   Patient presents with   • Sinus Problem     sx for the last 10 days       HPI: French Walter is a 53 year old male who presents with a sinus concern. The duration has been for 10 days. The quality is described as pain and pressure located over the maxillary sinuses. Associated symptoms include postnasal drip and throat irritation. Regarding modifying factors, no help with over-the-counter decongestants.    ROS: No fever    SHx:   History   Smoking Status   • Never Smoker   Smokeless Tobacco   • Never Used       Physical Exam:  Visit Vitals   • /74   • Pulse 68   • Temp 98.4 °F (36.9 °C) (Oral)   • Resp 18   • Ht 6' (1.829 m)   • Wt 82.7 kg   • SpO2 94%   • BMI 24.74 kg/m2     No results found for this or any previous visit.    Gen: well nourished, NAD. Non-toxic appearing  HEENT: PERRLA, EOMI. The sclerae are anicteric and the conjunctivae are non-injected. TM's are pearly. Oropharynx reveals MMM. Post nasal drainage is noted. The maxillary sinuses are tender to palpation and percussion.  NECK: No LAD  RESP: non-labored. CTA  CV: RRR, without M,G,R.    Impression:  1. Acute non-recurrent maxillary sinusitis  - doxycycline monohydrate (MONODOX) 100 MG capsule; Take 1 capsule by mouth 2 times daily for 10 days. Take with food.  Dispense: 20 capsule; Refill: 0  - promethazine-dextromethorphan (PROMETHAZINE-DM) 6.25-15 MG/5ML syrup; Take 5 mLs by mouth 4 times daily as needed for Cough.  Dispense: 118 mL; Refill: 0  The patient is to follow the printed instructions provided. Use medication as prescribed. He is to follow-up with his personal physician if not resolved. Seek care sooner if worsening or new symptoms develop.     bed rails

## 2018-06-21 NOTE — PROGRESS NOTE ADULT - ASSESSMENT
89  M with DM, HTN, HLD, LBBB,  BPH, PVD, AS s/p valve replacement July 2015 recent 6 month vacation outside the country to [place] and [place] p/w cough with hemoptysis, fever, chills, and night sweats x3 days. Here was febrile, CT chest with extensive RUL and RML consolidation and started on ceftriaxone and azithro, RVP and blood cx, quantiferon and sputun AFB x 2 were negative.  pt was persistently febrile so antibiotics were switched to unasyn+azithro, now afebrile for >24h    extensive RUL and RML pneumonia, negative blood cx and sputum AFB x 2  deranged LFTs, slightly improving    * c/w unasyn for now  * if any fever or worsening status repeat the chest CT

## 2018-06-21 NOTE — PHYSICAL THERAPY INITIAL EVALUATION ADULT - PRECAUTIONS/LIMITATIONS, REHAB EVAL
pertinent history continued... Pt reports breathing quickly, +SOB. +myalgias/arthralgias 2d ago resolved with Bengay. No advil or tylenol since Thursday. Denies massive hemoptysis. Pt had no known TB exposure, no known prior h/o TB, no known prior PPD. 2 pack year remote smoking hx, quit 60 years ago. Pt denies prior episodes, recent/recurrent lung infections, respiratory dz, known h/o CA, wt loss, numbness, paresthesias, weakness, altered mental status, syncope, change in vision, chest pain, back pain, abdominal pain, n/v/d, recent leg pain or swelling, or sick contacts. pertinent history continued... Pt reports breathing quickly, +SOB. +myalgias/arthralgias 2d ago resolved with Bengay. No advil or tylenol since Thursday. Denies massive hemoptysis. Pt had no known TB exposure, no known prior h/o TB, no known prior PPD. 2 pack year remote smoking hx, quit 60 years ago. Pt denies prior episodes, recent/recurrent lung infections, respiratory dz, known h/o CA, wt loss, numbness, paresthesias, weakness, altered mental status, syncope, change in vision, chest pain, back pain, abdominal pain, n/v/d, recent leg pain or swelling, or sick contacts.  CT chest with extensive RUL and RML consolidation and started on ceftriaxone and azithro, RVP and blood cx, quantiferon and sputum AFB x 2 were negative.. pertinent history continued... Pt reports breathing quickly, +SOB. +myalgias/arthralgias 2d ago resolved with Bengay. No advil or tylenol since Thursday. Denies massive hemoptysis. Pt had no known TB exposure, no known prior h/o TB, no known prior PPD. 2 pack year remote smoking hx, quit 60 years ago. Pt denies prior episodes, recent/recurrent lung infections, respiratory dz, known h/o CA, wt loss, numbness, paresthesias, weakness, altered mental status, syncope, change in vision, chest pain, back pain, abdominal pain, n/v/d, recent leg pain or swelling, or sick contacts.  CT chest with extensive RUL and RML consolidation and started on ceftriaxone and azithro, RVP and blood cx, quantiferon and sputum AFB x 2 were negative../fall precautions

## 2018-06-21 NOTE — PROGRESS NOTE ADULT - ASSESSMENT
89 y/o M with h/o HTN, chol, LBBB, DM2, BPH, PVD, AS s/p valve replacement July 2015 recent 6 month vacation outside the country to [place] and [place] p/w cough with hemoptysis, fever, chills, and night sweats x3 days. Pt was feeling fine previously, began having a cough and some "sniffles" 4 days ago after gardening all day, 3 days ago began having a dry non-productive cough that progressed to a cough with hemoptysis described as bright red blood tinged sputum. Pt reports breathing quickly, +SOB. +myalgias/arthralgias 2d ago resolved with Bengay.     pneumonia with recurrent fevers now afebrile for 24 hrs  - abx changed to unasyn per ID  - follow up blood cultures  -  tb unlikely  - sputum for afb negative  - QuantiFeron gold negative  - pulm  and ID eval  appreciated    bhavya improved  - iv fluids  - renal sono did not show hydro    abnormal lft's  improving  - liver sono did not show obstruction  - trend LFT's  - stoped lipitor  - hepatitis profile negative  - follow up MRI    htn  - cw home meds    uncontrolled diabetes  - insulin sliding scale  - hgb a1c 10   - endo eval noted    dvt px

## 2018-06-21 NOTE — PROGRESS NOTE ADULT - ASSESSMENT
Multilobar community acquired pneumonia  FEbrile, yet clinically improved and non toxic  Small R parapneumonic effusion on admission CT    REC    Contintue abx per ID  f/u CXR ordered  TTE ordered

## 2018-06-22 ENCOUNTER — TRANSCRIPTION ENCOUNTER (OUTPATIENT)
Age: 83
End: 2018-06-22

## 2018-06-22 LAB
ALBUMIN SERPL ELPH-MCNC: 2.6 G/DL — LOW (ref 3.3–5)
ALP SERPL-CCNC: 146 U/L — HIGH (ref 40–120)
ALT FLD-CCNC: 168 U/L — HIGH (ref 10–45)
ANION GAP SERPL CALC-SCNC: 13 MMOL/L — SIGNIFICANT CHANGE UP (ref 5–17)
AST SERPL-CCNC: 87 U/L — HIGH (ref 10–40)
BILIRUB SERPL-MCNC: 0.5 MG/DL — SIGNIFICANT CHANGE UP (ref 0.2–1.2)
BUN SERPL-MCNC: 16 MG/DL — SIGNIFICANT CHANGE UP (ref 7–23)
CALCIUM SERPL-MCNC: 8.6 MG/DL — SIGNIFICANT CHANGE UP (ref 8.4–10.5)
CHLORIDE SERPL-SCNC: 103 MMOL/L — SIGNIFICANT CHANGE UP (ref 96–108)
CO2 SERPL-SCNC: 24 MMOL/L — SIGNIFICANT CHANGE UP (ref 22–31)
CREAT SERPL-MCNC: 1.29 MG/DL — SIGNIFICANT CHANGE UP (ref 0.5–1.3)
GLUCOSE BLDC GLUCOMTR-MCNC: 122 MG/DL — HIGH (ref 70–99)
GLUCOSE BLDC GLUCOMTR-MCNC: 124 MG/DL — HIGH (ref 70–99)
GLUCOSE BLDC GLUCOMTR-MCNC: 146 MG/DL — HIGH (ref 70–99)
GLUCOSE BLDC GLUCOMTR-MCNC: 188 MG/DL — HIGH (ref 70–99)
GLUCOSE SERPL-MCNC: 124 MG/DL — HIGH (ref 70–99)
HCT VFR BLD CALC: 31 % — LOW (ref 39–50)
HGB BLD-MCNC: 10.2 G/DL — LOW (ref 13–17)
MAGNESIUM SERPL-MCNC: 1.7 MG/DL — SIGNIFICANT CHANGE UP (ref 1.6–2.6)
MCHC RBC-ENTMCNC: 27.6 PG — SIGNIFICANT CHANGE UP (ref 27–34)
MCHC RBC-ENTMCNC: 32.9 GM/DL — SIGNIFICANT CHANGE UP (ref 32–36)
MCV RBC AUTO: 83.8 FL — SIGNIFICANT CHANGE UP (ref 80–100)
PLATELET # BLD AUTO: 340 K/UL — SIGNIFICANT CHANGE UP (ref 150–400)
POTASSIUM SERPL-MCNC: 3.9 MMOL/L — SIGNIFICANT CHANGE UP (ref 3.5–5.3)
POTASSIUM SERPL-SCNC: 3.9 MMOL/L — SIGNIFICANT CHANGE UP (ref 3.5–5.3)
PROT SERPL-MCNC: 7.1 G/DL — SIGNIFICANT CHANGE UP (ref 6–8.3)
RBC # BLD: 3.7 M/UL — LOW (ref 4.2–5.8)
RBC # FLD: 15.7 % — HIGH (ref 10.3–14.5)
SODIUM SERPL-SCNC: 140 MMOL/L — SIGNIFICANT CHANGE UP (ref 135–145)
WBC # BLD: 9.23 K/UL — SIGNIFICANT CHANGE UP (ref 3.8–10.5)
WBC # FLD AUTO: 9.23 K/UL — SIGNIFICANT CHANGE UP (ref 3.8–10.5)

## 2018-06-22 PROCEDURE — 93306 TTE W/DOPPLER COMPLETE: CPT | Mod: 26

## 2018-06-22 PROCEDURE — 99232 SBSQ HOSP IP/OBS MODERATE 35: CPT

## 2018-06-22 RX ORDER — SITAGLIPTIN AND METFORMIN HYDROCHLORIDE 500; 50 MG/1; MG/1
1 TABLET, FILM COATED ORAL
Qty: 60 | Refills: 0 | OUTPATIENT
Start: 2018-06-22 | End: 2018-07-21

## 2018-06-22 RX ADMIN — Medication 8 UNIT(S): at 12:40

## 2018-06-22 RX ADMIN — Medication 8 UNIT(S): at 17:06

## 2018-06-22 RX ADMIN — AMPICILLIN SODIUM AND SULBACTAM SODIUM 100 GRAM(S): 250; 125 INJECTION, POWDER, FOR SUSPENSION INTRAMUSCULAR; INTRAVENOUS at 12:40

## 2018-06-22 RX ADMIN — Medication 8 UNIT(S): at 08:20

## 2018-06-22 RX ADMIN — TAMSULOSIN HYDROCHLORIDE 0.4 MILLIGRAM(S): 0.4 CAPSULE ORAL at 21:35

## 2018-06-22 RX ADMIN — HEPARIN SODIUM 5000 UNIT(S): 5000 INJECTION INTRAVENOUS; SUBCUTANEOUS at 18:15

## 2018-06-22 RX ADMIN — PANTOPRAZOLE SODIUM 40 MILLIGRAM(S): 20 TABLET, DELAYED RELEASE ORAL at 05:13

## 2018-06-22 RX ADMIN — INSULIN GLARGINE 14 UNIT(S): 100 INJECTION, SOLUTION SUBCUTANEOUS at 21:35

## 2018-06-22 RX ADMIN — AZITHROMYCIN 250 MILLIGRAM(S): 500 TABLET, FILM COATED ORAL at 18:15

## 2018-06-22 RX ADMIN — AMLODIPINE BESYLATE 10 MILLIGRAM(S): 2.5 TABLET ORAL at 05:13

## 2018-06-22 RX ADMIN — AMPICILLIN SODIUM AND SULBACTAM SODIUM 100 GRAM(S): 250; 125 INJECTION, POWDER, FOR SUSPENSION INTRAMUSCULAR; INTRAVENOUS at 05:13

## 2018-06-22 RX ADMIN — HEPARIN SODIUM 5000 UNIT(S): 5000 INJECTION INTRAVENOUS; SUBCUTANEOUS at 05:13

## 2018-06-22 RX ADMIN — LISINOPRIL 10 MILLIGRAM(S): 2.5 TABLET ORAL at 05:13

## 2018-06-22 RX ADMIN — AMPICILLIN SODIUM AND SULBACTAM SODIUM 100 GRAM(S): 250; 125 INJECTION, POWDER, FOR SUSPENSION INTRAMUSCULAR; INTRAVENOUS at 17:08

## 2018-06-22 NOTE — DISCHARGE NOTE ADULT - CARE PLAN
Principal Discharge DX:	Pneumonia  Secondary Diagnosis:	DM2 (diabetes mellitus, type 2)  Secondary Diagnosis:	HTN (hypertension) Principal Discharge DX:	Pneumonia  Goal:	resolution  Assessment and plan of treatment:	finish course of antibiotics; f/u with PCP with in one week of discharge  Secondary Diagnosis:	DM2 (diabetes mellitus, type 2)  Assessment and plan of treatment:	monitor BS; take prescribed mediation; f/u with PCP  Secondary Diagnosis:	HTN (hypertension)  Assessment and plan of treatment:	take prescribed medication; f/u with PCP  Secondary Diagnosis:	Transaminitis  Assessment and plan of treatment:	trending down; do not take statin until cleared by PCP

## 2018-06-22 NOTE — DISCHARGE NOTE ADULT - MEDICATION SUMMARY - MEDICATIONS TO STOP TAKING
I will STOP taking the medications listed below when I get home from the hospital:    atorvastatin 10 mg oral tablet  -- 1 tab(s) by mouth once a day (at bedtime)

## 2018-06-22 NOTE — DISCHARGE NOTE ADULT - HOSPITAL COURSE
Patient is a 89y old  Male who presents with a chief complaint of cough and fever; diagnosed with PNA; treated with IV antibiotics and d/c on 2  more days of oral antibiotics Patient is a 89y old  Male who presents with a chief complaint of cough and fever; diagnosed with PNA; treated with IV antibiotics and d/c on 2  more days of oral antibiotics; foolowed by endocrine; received insulin while hospitalized but d/c home on janumet; LFT's elevated - trending down; statin d/c; f/u with PCP

## 2018-06-22 NOTE — DISCHARGE NOTE ADULT - PLAN OF CARE
resolution finish course of antibiotics; f/u with PCP with in one week of discharge monitor BS; take prescribed mediation; f/u with PCP take prescribed medication; f/u with PCP trending down; do not take statin until cleared by PCP

## 2018-06-22 NOTE — DISCHARGE NOTE ADULT - PROVIDER TOKENS
TOKEN:'[id number]' TOKEN:'[id number]',FREE:[LAST:[PMD IN 2-3 DAYS],PHONE:[(   )    -],FAX:[(   )    -]]

## 2018-06-22 NOTE — DISCHARGE NOTE ADULT - MEDICATION SUMMARY - MEDICATIONS TO TAKE
I will START or STAY ON the medications listed below when I get home from the hospital:    lisinopril 10 mg oral tablet  -- 1 tab(s) by mouth once a day  -- Indication: For HTN (hypertension)    tamsulosin 0.4 mg oral capsule  -- 1 cap(s) by mouth once a day (at bedtime)  -- Indication: For     Janumet 50 mg-1000 mg oral tablet  -- 1 tab(s) by mouth 2 times a day   -- Do not drink alcoholic beverages when taking this medication.  Take with food or milk.    -- Indication: For Diabetes    amLODIPine 10 mg oral tablet  -- 1 tab(s) by mouth once a day  -- Indication: For HTN (hypertension)    Augmentin 875 mg-125 mg oral tablet  -- 1 tab(s) by mouth every 12 hours   -- Finish all this medication unless otherwise directed by prescriber.  Take with food or milk.    -- Indication: For PnA    pantoprazole 40 mg oral delayed release tablet  -- 1 tab(s) by mouth 2 times a day  -- It is very important that you take or use this exactly as directed.  Do not skip doses or discontinue unless directed by your doctor.  Obtain medical advice before taking any non-prescription drugs as some may affect the action of this medication.  Swallow whole.  Do not crush.    -- Indication: For GI I will START or STAY ON the medications listed below when I get home from the hospital:    lisinopril 10 mg oral tablet  -- 1 tab(s) by mouth once a day  -- Indication: For HTN (hypertension)    tamsulosin 0.4 mg oral capsule  -- 1 cap(s) by mouth once a day (at bedtime)  -- Indication: For     Janumet 50 mg-1000 mg oral tablet  -- 1 tab(s) by mouth 2 times a day   -- Do not drink alcoholic beverages when taking this medication.  Take with food or milk.    -- Indication: For Diabetes    amLODIPine 10 mg oral tablet  -- 1 tab(s) by mouth once a day  -- Indication: For HTN (hypertension)    Augmentin 875 mg-125 mg oral tablet  -- 1 tab(s) by mouth every 12 hours   -- Finish all this medication unless otherwise directed by prescriber.  Take with food or milk.    -- Indication: For PnA    pantoprazole 40 mg oral delayed release tablet  -- 1 tab(s) by mouth once a day x 30 days   -- It is very important that you take or use this exactly as directed.  Do not skip doses or discontinue unless directed by your doctor.  Obtain medical advice before taking any non-prescription drugs as some may affect the action of this medication.  Swallow whole.  Do not crush.    -- Indication: For gerd

## 2018-06-22 NOTE — PROGRESS NOTE ADULT - PROBLEM SELECTOR PLAN 1
Will continue current insulin regimen for now. Will continue monitoring FS, log, will Follow up.  DC home on Janumet 50/1000mg PO BID, FU 1month  Patient counseled for compliance with consistent low carb diet.

## 2018-06-22 NOTE — PROGRESS NOTE ADULT - ASSESSMENT
89  M with DM, HTN, HLD, LBBB,  BPH, PVD, AS s/p valve replacement July 2015 recent 6 month vacation outside the country to [place] and [place] p/w cough with hemoptysis, fever, chills, and night sweats x3 days. Here was febrile, CT chest with extensive RUL and RML consolidation and started on ceftriaxone and azithro, RVP and blood cx, quantiferon and sputun AFB x 2 were negative.  pt was persistently febrile so antibiotics were switched to unasyn+azithro, now afebrile for >24h    extensive RUL and RML pneumonia, negative blood cx and sputum AFB x 2  Transaminitis-clinically much better  No other foci  d 5 of abx  can change to oral augmentin  ContinueX 2 more days  Other plan per primary  case d/w Med NP  ID will follow as needed,please call [phone] if any questions or issues.

## 2018-06-22 NOTE — PROGRESS NOTE ADULT - ASSESSMENT
89 y/o M with h/o HTN, chol, LBBB, DM2, BPH, PVD, AS s/p valve replacement July 2015 recent 6 month vacation outside the country to [place] and [place] p/w cough with hemoptysis, fever, chills, and night sweats x3 days. Pt was feeling fine previously, began having a cough and some "sniffles" 4 days ago after gardening all day, 3 days ago began having a dry non-productive cough that progressed to a cough with hemoptysis described as bright red blood tinged sputum. Pt reports breathing quickly, +SOB. +myalgias/arthralgias 2d ago resolved with Bengay.     pneumonia with recurrent fevers now afebrile for 24 hrs  - abx changed to unasyn per ID  - follow up blood cultures  -  tb unlikely  - sputum for afb negative  - QuantiFeron gold negative  - pulm  and ID eval  appreciated    bhavya improved  - iv fluids  - renal sono did not show hydro    abnormal lft's  improving  - liver sono did not show obstruction  - trend LFT's  - stoped lipitor  - hepatitis profile negative  -  MRI done    htn  - cw home meds    uncontrolled diabetes  - insulin sliding scale  - hgb a1c 10   - endo eval noted    dvt px  d/c when cleared by ID

## 2018-06-22 NOTE — PROGRESS NOTE ADULT - ASSESSMENT
Multilobar community acquired pneumonia: clinnically resolving  Probable underlyinc CHF    REC    Contintue abx per ID  Pre DC TTE  DC planning 1-2 days per status

## 2018-06-22 NOTE — DISCHARGE NOTE ADULT - MEDICATION SUMMARY - MEDICATIONS TO CHANGE
I will SWITCH the dose or number of times a day I take the medications listed below when I get home from the hospital:  None I will SWITCH the dose or number of times a day I take the medications listed below when I get home from the hospital:    pantoprazole 40 mg oral delayed release tablet  -- 1 tab(s) by mouth 2 times a day  -- It is very important that you take or use this exactly as directed.  Do not skip doses or discontinue unless directed by your doctor.  Obtain medical advice before taking any non-prescription drugs as some may affect the action of this medication.  Swallow whole.  Do not crush.

## 2018-06-22 NOTE — DISCHARGE NOTE ADULT - CARE PROVIDER_API CALL
Marko Onelil), EndocrinologyMetabDiabetes; Internal Medicine  [address] [place] [address]  Phone: [phone]  Fax: [phone] Marko Oneill), EndocrinologyMetabDiabetes; Internal Medicine  [address] [place] [address]  Phone: [phone]  Fax: [phone]    PMD IN 2-3 DAYS,   Phone: (   )    -  Fax: (   )    -

## 2018-06-22 NOTE — DISCHARGE NOTE ADULT - PATIENT PORTAL LINK FT
You can access the [email or web address] Portal, offered by [facility], by registering with the following website: [email or web address]

## 2018-06-23 VITALS
OXYGEN SATURATION: 95 % | DIASTOLIC BLOOD PRESSURE: 73 MMHG | TEMPERATURE: 98 F | HEART RATE: 93 BPM | SYSTOLIC BLOOD PRESSURE: 150 MMHG | RESPIRATION RATE: 18 BRPM

## 2018-06-23 LAB
GLUCOSE BLDC GLUCOMTR-MCNC: 141 MG/DL — HIGH (ref 70–99)
GLUCOSE BLDC GLUCOMTR-MCNC: 179 MG/DL — HIGH (ref 70–99)

## 2018-06-23 PROCEDURE — 82607 VITAMIN B-12: CPT

## 2018-06-23 PROCEDURE — 86480 TB TEST CELL IMMUN MEASURE: CPT

## 2018-06-23 PROCEDURE — 87449 NOS EACH ORGANISM AG IA: CPT

## 2018-06-23 PROCEDURE — 96375 TX/PRO/DX INJ NEW DRUG ADDON: CPT

## 2018-06-23 PROCEDURE — 83605 ASSAY OF LACTIC ACID: CPT

## 2018-06-23 PROCEDURE — 84132 ASSAY OF SERUM POTASSIUM: CPT

## 2018-06-23 PROCEDURE — 71250 CT THORAX DX C-: CPT

## 2018-06-23 PROCEDURE — 80076 HEPATIC FUNCTION PANEL: CPT

## 2018-06-23 PROCEDURE — 83735 ASSAY OF MAGNESIUM: CPT

## 2018-06-23 PROCEDURE — 93306 TTE W/DOPPLER COMPLETE: CPT

## 2018-06-23 PROCEDURE — 96372 THER/PROPH/DIAG INJ SC/IM: CPT | Mod: XU

## 2018-06-23 PROCEDURE — 94640 AIRWAY INHALATION TREATMENT: CPT

## 2018-06-23 PROCEDURE — 97162 PT EVAL MOD COMPLEX 30 MIN: CPT

## 2018-06-23 PROCEDURE — 85027 COMPLETE CBC AUTOMATED: CPT

## 2018-06-23 PROCEDURE — 87581 M.PNEUMON DNA AMP PROBE: CPT

## 2018-06-23 PROCEDURE — 74183 MRI ABD W/O CNTR FLWD CNTR: CPT

## 2018-06-23 PROCEDURE — 84443 ASSAY THYROID STIM HORMONE: CPT

## 2018-06-23 PROCEDURE — 93005 ELECTROCARDIOGRAM TRACING: CPT

## 2018-06-23 PROCEDURE — 86900 BLOOD TYPING SEROLOGIC ABO: CPT

## 2018-06-23 PROCEDURE — 85014 HEMATOCRIT: CPT

## 2018-06-23 PROCEDURE — 80048 BASIC METABOLIC PNL TOTAL CA: CPT

## 2018-06-23 PROCEDURE — A9585: CPT

## 2018-06-23 PROCEDURE — 87116 MYCOBACTERIA CULTURE: CPT

## 2018-06-23 PROCEDURE — 85730 THROMBOPLASTIN TIME PARTIAL: CPT

## 2018-06-23 PROCEDURE — 87040 BLOOD CULTURE FOR BACTERIA: CPT

## 2018-06-23 PROCEDURE — 96374 THER/PROPH/DIAG INJ IV PUSH: CPT

## 2018-06-23 PROCEDURE — 82746 ASSAY OF FOLIC ACID SERUM: CPT

## 2018-06-23 PROCEDURE — 84100 ASSAY OF PHOSPHORUS: CPT

## 2018-06-23 PROCEDURE — 99285 EMERGENCY DEPT VISIT HI MDM: CPT | Mod: 25

## 2018-06-23 PROCEDURE — 82803 BLOOD GASES ANY COMBINATION: CPT

## 2018-06-23 PROCEDURE — 81001 URINALYSIS AUTO W/SCOPE: CPT

## 2018-06-23 PROCEDURE — 86901 BLOOD TYPING SEROLOGIC RH(D): CPT

## 2018-06-23 PROCEDURE — 71045 X-RAY EXAM CHEST 1 VIEW: CPT

## 2018-06-23 PROCEDURE — 76700 US EXAM ABDOM COMPLETE: CPT

## 2018-06-23 PROCEDURE — 87070 CULTURE OTHR SPECIMN AEROBIC: CPT

## 2018-06-23 PROCEDURE — 83880 ASSAY OF NATRIURETIC PEPTIDE: CPT

## 2018-06-23 PROCEDURE — 82435 ASSAY OF BLOOD CHLORIDE: CPT

## 2018-06-23 PROCEDURE — 82248 BILIRUBIN DIRECT: CPT

## 2018-06-23 PROCEDURE — 84295 ASSAY OF SERUM SODIUM: CPT

## 2018-06-23 PROCEDURE — 87633 RESP VIRUS 12-25 TARGETS: CPT

## 2018-06-23 PROCEDURE — 82962 GLUCOSE BLOOD TEST: CPT

## 2018-06-23 PROCEDURE — 87486 CHLMYD PNEUM DNA AMP PROBE: CPT

## 2018-06-23 PROCEDURE — 83036 HEMOGLOBIN GLYCOSYLATED A1C: CPT

## 2018-06-23 PROCEDURE — 85610 PROTHROMBIN TIME: CPT

## 2018-06-23 PROCEDURE — 87206 SMEAR FLUORESCENT/ACID STAI: CPT

## 2018-06-23 PROCEDURE — 80074 ACUTE HEPATITIS PANEL: CPT

## 2018-06-23 PROCEDURE — 87798 DETECT AGENT NOS DNA AMP: CPT

## 2018-06-23 PROCEDURE — 87015 SPECIMEN INFECT AGNT CONCNTJ: CPT

## 2018-06-23 PROCEDURE — 82947 ASSAY GLUCOSE BLOOD QUANT: CPT

## 2018-06-23 PROCEDURE — 80053 COMPREHEN METABOLIC PANEL: CPT

## 2018-06-23 PROCEDURE — 86850 RBC ANTIBODY SCREEN: CPT

## 2018-06-23 PROCEDURE — 82330 ASSAY OF CALCIUM: CPT

## 2018-06-23 RX ORDER — PANTOPRAZOLE SODIUM 20 MG/1
1 TABLET, DELAYED RELEASE ORAL
Qty: 30 | Refills: 0 | OUTPATIENT
Start: 2018-06-23 | End: 2018-07-22

## 2018-06-23 RX ADMIN — Medication 8 UNIT(S): at 12:09

## 2018-06-23 RX ADMIN — AMLODIPINE BESYLATE 10 MILLIGRAM(S): 2.5 TABLET ORAL at 06:03

## 2018-06-23 RX ADMIN — Medication 8 UNIT(S): at 08:36

## 2018-06-23 RX ADMIN — LISINOPRIL 10 MILLIGRAM(S): 2.5 TABLET ORAL at 06:03

## 2018-06-23 RX ADMIN — Medication 1: at 12:09

## 2018-06-23 RX ADMIN — AMPICILLIN SODIUM AND SULBACTAM SODIUM 100 GRAM(S): 250; 125 INJECTION, POWDER, FOR SUSPENSION INTRAMUSCULAR; INTRAVENOUS at 12:09

## 2018-06-23 RX ADMIN — AMPICILLIN SODIUM AND SULBACTAM SODIUM 100 GRAM(S): 250; 125 INJECTION, POWDER, FOR SUSPENSION INTRAMUSCULAR; INTRAVENOUS at 06:02

## 2018-06-23 RX ADMIN — AMPICILLIN SODIUM AND SULBACTAM SODIUM 100 GRAM(S): 250; 125 INJECTION, POWDER, FOR SUSPENSION INTRAMUSCULAR; INTRAVENOUS at 00:36

## 2018-06-23 RX ADMIN — PANTOPRAZOLE SODIUM 40 MILLIGRAM(S): 20 TABLET, DELAYED RELEASE ORAL at 06:03

## 2018-06-23 RX ADMIN — HEPARIN SODIUM 5000 UNIT(S): 5000 INJECTION INTRAVENOUS; SUBCUTANEOUS at 06:02

## 2018-06-23 NOTE — PROGRESS NOTE ADULT - PROVIDER SPECIALTY LIST ADULT
Endocrinology
Infectious Disease
Internal Medicine
Pulmonology
Internal Medicine
Internal Medicine

## 2018-06-23 NOTE — PROGRESS NOTE ADULT - ASSESSMENT
89 y/o M with h/o HTN, chol, LBBB, DM2, BPH, PVD, AS s/p valve replacement July 2015 recent 6 month vacation outside the country to [place] and [place] p/w cough with hemoptysis, fever, chills, and night sweats x3 days. Pt was feeling fine previously, began having a cough and some "sniffles" 4 days ago after gardening all day, 3 days ago began having a dry non-productive cough that progressed to a cough with hemoptysis described as bright red blood tinged sputum. Pt reports breathing quickly, +SOB. +myalgias/arthralgias 2d ago resolved with Bengay.     pneumonia with recurrent fevers now afebrile for 24 hrs  - abx changed to unasyn per ID  - follow up blood cultures  -  tb unlikely  - sputum for afb negative  - QuantiFeron gold negative  - pulm  and ID eval  appreciated    bhavya improved  - iv fluids  - renal sono did not show hydro    abnormal lft's  improving  - liver sono did not show obstruction  - trend LFT's  - stoped lipitor  - hepatitis profile negative  -  MRI done    htn  - cw home meds    uncontrolled diabetes  - insulin sliding scale  - hgb a1c 10   - endo eval noted    dvt px  d/c home today  follow up with PCP .

## 2018-06-23 NOTE — PROGRESS NOTE ADULT - SUBJECTIVE AND OBJECTIVE BOX
Patient is a 89y old  Male who presents with a chief complaint of cough and fever (16 Jun 2018 16:58)      SUBJECTIVE / OVERNIGHT EVENTS:    MEDICATIONS  (STANDING):  amLODIPine   Tablet 10 milliGRAM(s) Oral daily  ampicillin/sulbactam  IVPB 1.5 Gram(s) IV Intermittent every 6 hours  azithromycin  IVPB 500 milliGRAM(s) IV Intermittent every 24 hours  dextrose 5%. 1000 milliLiter(s) (50 mL/Hr) IV Continuous <Continuous>  dextrose 50% Injectable 12.5 Gram(s) IV Push once  dextrose 50% Injectable 25 Gram(s) IV Push once  dextrose 50% Injectable 25 Gram(s) IV Push once  heparin  Injectable 5000 Unit(s) SubCutaneous every 12 hours  insulin glargine Injectable (LANTUS) 14 Unit(s) SubCutaneous at bedtime  insulin lispro (HumaLOG) corrective regimen sliding scale   SubCutaneous three times a day before meals  insulin lispro (HumaLOG) corrective regimen sliding scale   SubCutaneous at bedtime  insulin lispro Injectable (HumaLOG) 8 Unit(s) SubCutaneous three times a day before meals  lisinopril 10 milliGRAM(s) Oral daily  pantoprazole    Tablet 40 milliGRAM(s) Oral before breakfast  tamsulosin 0.4 milliGRAM(s) Oral at bedtime    MEDICATIONS  (PRN):  dextrose 40% Gel 15 Gram(s) Oral once PRN Blood Glucose LESS THAN 70 milliGRAM(s)/deciliter  glucagon  Injectable 1 milliGRAM(s) IntraMuscular once PRN Glucose LESS THAN 70 milligrams/deciliter      Vital Signs Last 24 Hrs  T(C): 37.3 (21 Jun 2018 05:14), Max: 37.5 (20 Jun 2018 14:14)  T(F): 99.1 (21 Jun 2018 05:14), Max: 99.5 (20 Jun 2018 14:14)  HR: 96 (21 Jun 2018 05:14) (90 - 100)  BP: 128/66 (21 Jun 2018 05:14) (128/66 - 152/68)  BP(mean): --  RR: 18 (21 Jun 2018 05:14) (18 - 18)  SpO2: 96% (21 Jun 2018 05:14) (95% - 98%)  CAPILLARY BLOOD GLUCOSE      POCT Blood Glucose.: 161 mg/dL (21 Jun 2018 11:57)  POCT Blood Glucose.: 117 mg/dL (21 Jun 2018 08:22)  POCT Blood Glucose.: 216 mg/dL (20 Jun 2018 21:40)  POCT Blood Glucose.: 187 mg/dL (20 Jun 2018 16:37)  POCT Blood Glucose.: 127 mg/dL (20 Jun 2018 12:06)    I&O's Summary    20 Jun 2018 07:01  -  21 Jun 2018 07:00  --------------------------------------------------------  IN: 1460 mL / OUT: 850 mL / NET: 610 mL    21 Jun 2018 07:01  -  21 Jun 2018 12:05  --------------------------------------------------------  IN: 240 mL / OUT: 100 mL / NET: 140 mL        PHYSICAL EXAM:  GENERAL: NAD, well-developed  HEAD:  Atraumatic, Normocephalic  EYES: EOMI, PERRLA, conjunctiva and sclera clear  NECK: Supple, No JVD  CHEST/LUNG: Clear to auscultation bilaterally; No wheeze  HEART: Regular rate and rhythm; No murmurs, rubs, or gallops  ABDOMEN: Soft, Nontender, Nondistended; Bowel sounds present  EXTREMITIES:  2+ Peripheral Pulses, No clubbing, cyanosis, or edema  PSYCH: AAOx3  NEUROLOGY: non-focal  SKIN: No rashes or lesions    LABS:                        9.7    9.18  )-----------( 298      ( 21 Jun 2018 08:04 )             30.2     06-21    138  |  102  |  16  ----------------------------<  120<H>  3.4<L>   |  23  |  1.30    Ca    8.4      21 Jun 2018 06:35  Phos  2.8     06-20  Mg     1.9     06-20    TPro  6.7  /  Alb  2.4<L>  /  TBili  0.6  /  DBili  x   /  AST  115<H>  /  ALT  207<H>  /  AlkPhos  151<H>  06-21              RADIOLOGY & ADDITIONAL TESTS:    Imaging Personally Reviewed:    Consultant(s) Notes Reviewed:      Care Discussed with Consultants/Other Providers:
Patient is a 89y old  Male who presents with a chief complaint of cough and fever (16 Jun 2018 16:58)      SUBJECTIVE / OVERNIGHT EVENTS:  coughing blood tinge sputum    MEDICATIONS  (STANDING):  amLODIPine   Tablet 10 milliGRAM(s) Oral daily  atorvastatin 10 milliGRAM(s) Oral at bedtime  azithromycin  IVPB 500 milliGRAM(s) IV Intermittent every 24 hours  cefTRIAXone   IVPB 1 Gram(s) IV Intermittent every 24 hours  dextrose 5%. 1000 milliLiter(s) (50 mL/Hr) IV Continuous <Continuous>  dextrose 50% Injectable 12.5 Gram(s) IV Push once  dextrose 50% Injectable 25 Gram(s) IV Push once  dextrose 50% Injectable 25 Gram(s) IV Push once  insulin lispro (HumaLOG) corrective regimen sliding scale   SubCutaneous three times a day before meals  insulin lispro (HumaLOG) corrective regimen sliding scale   SubCutaneous at bedtime  lisinopril 10 milliGRAM(s) Oral daily  pantoprazole    Tablet 40 milliGRAM(s) Oral before breakfast  sodium chloride 0.9%. 1000 milliLiter(s) (75 mL/Hr) IV Continuous <Continuous>  tamsulosin 0.4 milliGRAM(s) Oral at bedtime    MEDICATIONS  (PRN):  dextrose 40% Gel 15 Gram(s) Oral once PRN Blood Glucose LESS THAN 70 milliGRAM(s)/deciliter  glucagon  Injectable 1 milliGRAM(s) IntraMuscular once PRN Glucose LESS THAN 70 milligrams/deciliter      Vital Signs Last 24 Hrs  T(C): 37.3 (17 Jun 2018 05:22), Max: 39 (16 Jun 2018 14:33)  T(F): 99.1 (17 Jun 2018 05:22), Max: 102.2 (16 Jun 2018 14:33)  HR: 88 (17 Jun 2018 05:22) (70 - 102)  BP: 156/72 (17 Jun 2018 05:22) (124/63 - 165/74)  BP(mean): --  RR: 20 (17 Jun 2018 05:22) (18 - 20)  SpO2: 96% (17 Jun 2018 05:22) (94% - 96%)  CAPILLARY BLOOD GLUCOSE      POCT Blood Glucose.: 193 mg/dL (17 Jun 2018 08:07)  POCT Blood Glucose.: 247 mg/dL (16 Jun 2018 21:15)  POCT Blood Glucose.: 277 mg/dL (16 Jun 2018 16:43)  POCT Blood Glucose.: 374 mg/dL (16 Jun 2018 14:14)    I&O's Summary    16 Jun 2018 07:01  -  17 Jun 2018 07:00  --------------------------------------------------------  IN: 1620 mL / OUT: 600 mL / NET: 1020 mL        PHYSICAL EXAM:  GENERAL: NAD, well-developed  HEAD:  Atraumatic, Normocephalic  EYES: EOMI, PERRLA, conjunctiva and sclera clear  NECK: Supple, No JVD  CHEST/LUNG: Clear to auscultation bilaterally; No wheeze  HEART: Regular rate and rhythm; No murmurs, rubs, or gallops  ABDOMEN: Soft, Nontender, Nondistended; Bowel sounds present  EXTREMITIES:  2+ Peripheral Pulses, No clubbing, cyanosis, or edema  PSYCH: AAOx3  NEUROLOGY: non-focal  SKIN: No rashes or lesions    LABS:                        10.7   11.61 )-----------( 199      ( 17 Jun 2018 08:20 )             33.1     06-17    136  |  103  |  28<H>  ----------------------------<  230<H>  3.9   |  18<L>  |  1.72<H>    Ca    8.6      17 Jun 2018 06:37  Phos  2.7     06-17  Mg     2.2     06-17    TPro  8.0  /  Alb  2.7<L>  /  TBili  0.5  /  DBili  0.2  /  AST  97<H>  /  ALT  85<H>  /  AlkPhos  121<H>  06-17    PT/INR - ( 16 Jun 2018 11:31 )   PT: 18.4 sec;   INR: 1.67 ratio         PTT - ( 16 Jun 2018 11:31 )  PTT:28.8 sec          RADIOLOGY & ADDITIONAL TESTS:    Imaging Personally Reviewed:    Consultant(s) Notes Reviewed:      Care Discussed with Consultants/Other Providers:
Chief complaint  Patient is a 89y old  Male who presents with a chief complaint of cough and fever (16 Jun 2018 16:58)   Review of systems  Patient in bed, looks comfortable, no fever, no hypoglycemia.    Labs and Fingersticks  CAPILLARY BLOOD GLUCOSE      POCT Blood Glucose.: 161 mg/dL (21 Jun 2018 11:57)  POCT Blood Glucose.: 117 mg/dL (21 Jun 2018 08:22)  POCT Blood Glucose.: 216 mg/dL (20 Jun 2018 21:40)  POCT Blood Glucose.: 187 mg/dL (20 Jun 2018 16:37)      Anion Gap, Serum: 13 (06-21 @ 06:35)  Anion Gap, Serum: 17 (06-20 @ 06:17)      Calcium, Total Serum: 8.4 (06-21 @ 06:35)  Calcium, Total Serum: 8.5 (06-20 @ 06:17)  Albumin, Serum: 2.4 <L> (06-21 @ 06:35)  Albumin, Serum: 2.1 <L> (06-20 @ 06:17)    Alanine Aminotransferase (ALT/SGPT): 207 <H> (06-21 @ 06:35)  Alanine Aminotransferase (ALT/SGPT): 279 <H> (06-20 @ 06:17)  Alkaline Phosphatase, Serum: 151 <H> (06-21 @ 06:35)  Alkaline Phosphatase, Serum: 153 <H> (06-20 @ 06:17)  Aspartate Aminotransferase (AST/SGOT): 115 <H> (06-21 @ 06:35)  Aspartate Aminotransferase (AST/SGOT): 181 <H> (06-20 @ 06:17)        06-21    138  |  102  |  16  ----------------------------<  120<H>  3.4<L>   |  23  |  1.30    Ca    8.4      21 Jun 2018 06:35  Phos  2.8     06-20  Mg     1.9     06-20    TPro  6.7  /  Alb  2.4<L>  /  TBili  0.6  /  DBili  x   /  AST  115<H>  /  ALT  207<H>  /  AlkPhos  151<H>  06-21                        9.7    9.18  )-----------( 298      ( 21 Jun 2018 08:04 )             30.2     Medications  MEDICATIONS  (STANDING):  amLODIPine   Tablet 10 milliGRAM(s) Oral daily  ampicillin/sulbactam  IVPB 1.5 Gram(s) IV Intermittent every 6 hours  azithromycin  IVPB 500 milliGRAM(s) IV Intermittent every 24 hours  dextrose 5%. 1000 milliLiter(s) (50 mL/Hr) IV Continuous <Continuous>  dextrose 50% Injectable 12.5 Gram(s) IV Push once  dextrose 50% Injectable 25 Gram(s) IV Push once  dextrose 50% Injectable 25 Gram(s) IV Push once  heparin  Injectable 5000 Unit(s) SubCutaneous every 12 hours  insulin glargine Injectable (LANTUS) 14 Unit(s) SubCutaneous at bedtime  insulin lispro (HumaLOG) corrective regimen sliding scale   SubCutaneous three times a day before meals  insulin lispro (HumaLOG) corrective regimen sliding scale   SubCutaneous at bedtime  insulin lispro Injectable (HumaLOG) 8 Unit(s) SubCutaneous three times a day before meals  lisinopril 10 milliGRAM(s) Oral daily  pantoprazole    Tablet 40 milliGRAM(s) Oral before breakfast  tamsulosin 0.4 milliGRAM(s) Oral at bedtime      Physical Exam  General: Patient comfortable in bed  Vital Signs Last 12 Hrs  T(F): 99.1 (06-21-18 @ 05:14), Max: 99.1 (06-21-18 @ 05:14)  HR: 96 (06-21-18 @ 05:14) (96 - 96)  BP: 128/66 (06-21-18 @ 05:14) (128/66 - 128/66)  BP(mean): --  RR: 18 (06-21-18 @ 05:14) (18 - 18)  SpO2: 96% (06-21-18 @ 05:14) (96% - 96%)  Neck: No palpable thyroid nodules.  CVS: S1S2, No murmurs  Respiratory: No wheezing, no crepitations  GI: Abdomen soft, bowel sounds positive  Musculoskeletal:  edema lower extremities.   Skin: No skin rashes, no ecchymosis    Diagnostics
Chief complaint  Patient is a 89y old  Male who presents with a chief complaint of cough and fever (16 Jun 2018 16:58)   Review of systems  Patient in bed, looks comfortable, no fever, no hypoglycemia.    Labs and Fingersticks  CAPILLARY BLOOD GLUCOSE      POCT Blood Glucose.: 172 mg/dL (19 Jun 2018 16:48)  POCT Blood Glucose.: 211 mg/dL (19 Jun 2018 12:00)  POCT Blood Glucose.: 171 mg/dL (19 Jun 2018 07:54)  POCT Blood Glucose.: 179 mg/dL (18 Jun 2018 22:32)      Anion Gap, Serum: 13 (06-19 @ 06:34)  Anion Gap, Serum: 16 (06-18 @ 06:27)      Calcium, Total Serum: 8.6 (06-19 @ 06:34)  Calcium, Total Serum: 8.7 (06-18 @ 06:27)  Albumin, Serum: 2.3 <L> (06-19 @ 06:34)  Albumin, Serum: 2.4 <L> (06-18 @ 06:27)    Alanine Aminotransferase (ALT/SGPT): 448 <H> (06-19 @ 06:34)  Alanine Aminotransferase (ALT/SGPT): 284 <H> (06-18 @ 06:27)  Alkaline Phosphatase, Serum: 151 <H> (06-19 @ 06:34)  Alkaline Phosphatase, Serum: 125 <H> (06-18 @ 06:27)  Aspartate Aminotransferase (AST/SGOT): 526 <H> (06-19 @ 06:34)  Aspartate Aminotransferase (AST/SGOT): 467 <H> (06-18 @ 06:27)        06-19    134<L>  |  101  |  24<H>  ----------------------------<  189<H>  3.7   |  20<L>  |  1.44<H>    Ca    8.6      19 Jun 2018 06:34  Phos  2.6     06-19  Mg     2.1     06-19    TPro  6.9  /  Alb  2.3<L>  /  TBili  0.7  /  DBili  0.2  /  AST  526<H>  /  ALT  448<H>  /  AlkPhos  151<H>  06-19                        11.0   7.36  )-----------( 241      ( 19 Jun 2018 08:11 )             31.9     Medications  MEDICATIONS  (STANDING):  amLODIPine   Tablet 10 milliGRAM(s) Oral daily  ampicillin/sulbactam  IVPB 1.5 Gram(s) IV Intermittent every 6 hours  azithromycin  IVPB 500 milliGRAM(s) IV Intermittent every 24 hours  dextrose 5%. 1000 milliLiter(s) (50 mL/Hr) IV Continuous <Continuous>  dextrose 50% Injectable 12.5 Gram(s) IV Push once  dextrose 50% Injectable 25 Gram(s) IV Push once  dextrose 50% Injectable 25 Gram(s) IV Push once  heparin  Injectable 5000 Unit(s) SubCutaneous every 12 hours  insulin glargine Injectable (LANTUS) 14 Unit(s) SubCutaneous at bedtime  insulin lispro (HumaLOG) corrective regimen sliding scale   SubCutaneous three times a day before meals  insulin lispro (HumaLOG) corrective regimen sliding scale   SubCutaneous at bedtime  insulin lispro Injectable (HumaLOG) 8 Unit(s) SubCutaneous three times a day before meals  lisinopril 10 milliGRAM(s) Oral daily  pantoprazole    Tablet 40 milliGRAM(s) Oral before breakfast  tamsulosin 0.4 milliGRAM(s) Oral at bedtime      Physical Exam  General: Patient comfortable in bed  Vital Signs Last 12 Hrs  T(F): 97.9 (06-19-18 @ 14:21), Max: 97.9 (06-19-18 @ 14:21)  HR: 93 (06-19-18 @ 14:21) (93 - 93)  BP: 152/71 (06-19-18 @ 14:21) (152/71 - 152/71)  BP(mean): --  RR: 18 (06-19-18 @ 14:21) (18 - 18)  SpO2: 93% (06-19-18 @ 14:21) (93% - 93%)  Neck: No palpable thyroid nodules.  CVS: S1S2, No murmurs  Respiratory: No wheezing, no crepitations  GI: Abdomen soft, bowel sounds positive  Musculoskeletal:  edema lower extremities.   Skin: No skin rashes, no ecchymosis    Diagnostics
Chief complaint  Patient is a 89y old  Male who presents with a chief complaint of cough and fever (16 Jun 2018 16:58)   Review of systems  Patient in bed, looks comfortable, no fever, no hypoglycemia.    Labs and Fingersticks  CAPILLARY BLOOD GLUCOSE      POCT Blood Glucose.: 187 mg/dL (20 Jun 2018 16:37)  POCT Blood Glucose.: 127 mg/dL (20 Jun 2018 12:06)  POCT Blood Glucose.: 111 mg/dL (20 Jun 2018 08:00)  POCT Blood Glucose.: 141 mg/dL (19 Jun 2018 21:56)      Anion Gap, Serum: 17 (06-20 @ 06:17)  Anion Gap, Serum: 13 (06-19 @ 06:34)      Calcium, Total Serum: 8.5 (06-20 @ 06:17)  Calcium, Total Serum: 8.6 (06-19 @ 06:34)  Albumin, Serum: 2.1 <L> (06-20 @ 06:17)  Albumin, Serum: 2.3 <L> (06-19 @ 06:34)    Alanine Aminotransferase (ALT/SGPT): 279 <H> (06-20 @ 06:17)  Alanine Aminotransferase (ALT/SGPT): 448 <H> (06-19 @ 06:34)  Alkaline Phosphatase, Serum: 153 <H> (06-20 @ 06:17)  Alkaline Phosphatase, Serum: 151 <H> (06-19 @ 06:34)  Aspartate Aminotransferase (AST/SGOT): 181 <H> (06-20 @ 06:17)  Aspartate Aminotransferase (AST/SGOT): 526 <H> (06-19 @ 06:34)        06-20    137  |  102  |  21  ----------------------------<  126<H>  3.8   |  18<L>  |  1.28    Ca    8.5      20 Jun 2018 06:17  Phos  2.8     06-20  Mg     1.9     06-20    TPro  7.0  /  Alb  2.1<L>  /  TBili  0.8  /  DBili  0.2  /  AST  181<H>  /  ALT  279<H>  /  AlkPhos  153<H>  06-20                        11.1   7.90  )-----------( 276      ( 20 Jun 2018 08:03 )             33.6     Medications  MEDICATIONS  (STANDING):  amLODIPine   Tablet 10 milliGRAM(s) Oral daily  ampicillin/sulbactam  IVPB 1.5 Gram(s) IV Intermittent every 6 hours  azithromycin  IVPB 500 milliGRAM(s) IV Intermittent every 24 hours  dextrose 5%. 1000 milliLiter(s) (50 mL/Hr) IV Continuous <Continuous>  dextrose 50% Injectable 12.5 Gram(s) IV Push once  dextrose 50% Injectable 25 Gram(s) IV Push once  dextrose 50% Injectable 25 Gram(s) IV Push once  heparin  Injectable 5000 Unit(s) SubCutaneous every 12 hours  insulin glargine Injectable (LANTUS) 14 Unit(s) SubCutaneous at bedtime  insulin lispro (HumaLOG) corrective regimen sliding scale   SubCutaneous three times a day before meals  insulin lispro (HumaLOG) corrective regimen sliding scale   SubCutaneous at bedtime  insulin lispro Injectable (HumaLOG) 8 Unit(s) SubCutaneous three times a day before meals  lisinopril 10 milliGRAM(s) Oral daily  pantoprazole    Tablet 40 milliGRAM(s) Oral before breakfast  tamsulosin 0.4 milliGRAM(s) Oral at bedtime      Physical Exam  General: Patient comfortable in bed  Vital Signs Last 12 Hrs  T(F): 99.5 (06-20-18 @ 14:14), Max: 99.5 (06-20-18 @ 14:14)  HR: 90 (06-20-18 @ 14:14) (90 - 90)  BP: 152/68 (06-20-18 @ 14:14) (152/68 - 152/68)  BP(mean): --  RR: 18 (06-20-18 @ 14:14) (18 - 18)  SpO2: 98% (06-20-18 @ 14:14) (98% - 98%)  Neck: No palpable thyroid nodules.  CVS: S1S2, No murmurs  Respiratory: No wheezing, no crepitations  GI: Abdomen soft, bowel sounds positive  Musculoskeletal:  edema lower extremities.   Skin: No skin rashes, no ecchymosis    Diagnostics
Chief complaint  Patient is a 89y old  Male who presents with a chief complaint of cough and fever (22 Jun 2018 13:42)   Review of systems  Patient in bed, looks comfortable, no fever, no hypoglycemia.    Labs and Fingersticks  CAPILLARY BLOOD GLUCOSE      POCT Blood Glucose.: 124 mg/dL (22 Jun 2018 11:52)  POCT Blood Glucose.: 122 mg/dL (22 Jun 2018 08:07)  POCT Blood Glucose.: 203 mg/dL (21 Jun 2018 21:42)  POCT Blood Glucose.: 176 mg/dL (21 Jun 2018 17:10)      Anion Gap, Serum: 13 (06-22 @ 06:33)  Anion Gap, Serum: 13 (06-21 @ 06:35)      Calcium, Total Serum: 8.6 (06-22 @ 06:33)  Calcium, Total Serum: 8.4 (06-21 @ 06:35)  Albumin, Serum: 2.6 <L> (06-22 @ 06:33)  Albumin, Serum: 2.4 <L> (06-21 @ 06:35)    Alanine Aminotransferase (ALT/SGPT): 168 <H> (06-22 @ 06:33)  Alanine Aminotransferase (ALT/SGPT): 207 <H> (06-21 @ 06:35)  Alkaline Phosphatase, Serum: 146 <H> (06-22 @ 06:33)  Alkaline Phosphatase, Serum: 151 <H> (06-21 @ 06:35)  Aspartate Aminotransferase (AST/SGOT): 87 <H> (06-22 @ 06:33)  Aspartate Aminotransferase (AST/SGOT): 115 <H> (06-21 @ 06:35)        06-22    140  |  103  |  16  ----------------------------<  124<H>  3.9   |  24  |  1.29    Ca    8.6      22 Jun 2018 06:33  Mg     1.7     06-22    TPro  7.1  /  Alb  2.6<L>  /  TBili  0.5  /  DBili  x   /  AST  87<H>  /  ALT  168<H>  /  AlkPhos  146<H>  06-22                        10.2   9.23  )-----------( 340      ( 22 Jun 2018 08:13 )             31.0     Medications  MEDICATIONS  (STANDING):  amLODIPine   Tablet 10 milliGRAM(s) Oral daily  ampicillin/sulbactam  IVPB 1.5 Gram(s) IV Intermittent every 6 hours  azithromycin  IVPB 500 milliGRAM(s) IV Intermittent every 24 hours  dextrose 5%. 1000 milliLiter(s) (50 mL/Hr) IV Continuous <Continuous>  dextrose 50% Injectable 12.5 Gram(s) IV Push once  dextrose 50% Injectable 25 Gram(s) IV Push once  dextrose 50% Injectable 25 Gram(s) IV Push once  heparin  Injectable 5000 Unit(s) SubCutaneous every 12 hours  insulin glargine Injectable (LANTUS) 14 Unit(s) SubCutaneous at bedtime  insulin lispro (HumaLOG) corrective regimen sliding scale   SubCutaneous three times a day before meals  insulin lispro (HumaLOG) corrective regimen sliding scale   SubCutaneous at bedtime  insulin lispro Injectable (HumaLOG) 8 Unit(s) SubCutaneous three times a day before meals  lisinopril 10 milliGRAM(s) Oral daily  pantoprazole    Tablet 40 milliGRAM(s) Oral before breakfast  tamsulosin 0.4 milliGRAM(s) Oral at bedtime      Physical Exam  General: Patient comfortable in bed  Vital Signs Last 12 Hrs  T(F): 99 (06-22-18 @ 05:02), Max: 99 (06-22-18 @ 05:02)  HR: 85 (06-22-18 @ 05:02) (85 - 85)  BP: 135/73 (06-22-18 @ 05:02) (135/73 - 135/73)  BP(mean): --  RR: 18 (06-22-18 @ 05:02) (18 - 18)  SpO2: 92% (06-22-18 @ 10:00) (92% - 97%)  Neck: No palpable thyroid nodules.  CVS: S1S2, No murmurs  Respiratory: No wheezing, no crepitations  GI: Abdomen soft, bowel sounds positive  Musculoskeletal:  edema lower extremities.   Skin: No skin rashes, no ecchymosis    Diagnostics
Follow Up:  pneumonia    Interval History: pt stable and afebrile for >24 h, all cx negative    ROS:      All other systems negative    Constitutional: no fever now, no chills  Head: no trauma  Eyes: no vision changes, no eye pain  ENT:  no sore throat, no rhinorrhea  Cardiovascular:  no chest pain, no palpitation  Respiratory:  no SOB, + cough  GI:  no abd pain, no vomiting, no diarrhea  urinary: no dysuria, no hematuria, no flank pain  musculoskeletal:  no joint pain, no joint swelling  skin:  no rash  neurology:  no headache, no seizure, no change in mental status  psych: no anxiety, no depression         Allergies  Cipro (Pruritus; Rash)        ANTIMICROBIALS:  ampicillin/sulbactam  IVPB 1.5 every 6 hours  azithromycin  IVPB 500 every 24 hours      OTHER MEDS:  amLODIPine   Tablet 10 milliGRAM(s) Oral daily  dextrose 40% Gel 15 Gram(s) Oral once PRN  dextrose 5%. 1000 milliLiter(s) IV Continuous <Continuous>  dextrose 50% Injectable 12.5 Gram(s) IV Push once  dextrose 50% Injectable 25 Gram(s) IV Push once  dextrose 50% Injectable 25 Gram(s) IV Push once  glucagon  Injectable 1 milliGRAM(s) IntraMuscular once PRN  heparin  Injectable 5000 Unit(s) SubCutaneous every 12 hours  insulin glargine Injectable (LANTUS) 14 Unit(s) SubCutaneous at bedtime  insulin lispro (HumaLOG) corrective regimen sliding scale   SubCutaneous three times a day before meals  insulin lispro (HumaLOG) corrective regimen sliding scale   SubCutaneous at bedtime  insulin lispro Injectable (HumaLOG) 8 Unit(s) SubCutaneous three times a day before meals  lisinopril 10 milliGRAM(s) Oral daily  pantoprazole    Tablet 40 milliGRAM(s) Oral before breakfast  tamsulosin 0.4 milliGRAM(s) Oral at bedtime      Vital Signs Last 24 Hrs  T(C): 37.3 (21 Jun 2018 05:14), Max: 37.5 (20 Jun 2018 14:14)  T(F): 99.1 (21 Jun 2018 05:14), Max: 99.5 (20 Jun 2018 14:14)  HR: 96 (21 Jun 2018 05:14) (90 - 100)  BP: 128/66 (21 Jun 2018 05:14) (128/66 - 152/68)  BP(mean): --  RR: 18 (21 Jun 2018 05:14) (18 - 18)  SpO2: 96% (21 Jun 2018 05:14) (95% - 98%)    Physical Exam:  General:    NAD,  non toxic,   Head: atraumatic, normocephalic  Eye: normal sclera and conjunctiva  ENT:    no oropharyngeal lesions,   no LAD,   neck supple  Cardio:     regular S1, S2,  no murmur  Respiratory:    right side crackles  abd:     soft,   BS +,   no tenderness,    no organomegaly  :   no CVAT,  no suprapubic tenderness,   no  watters  Musculoskeletal:   no joint swelling,   no edema  vascular: no central lines, normal pulses  Skin:    no rash  Neurologic:     no focal deficit  psych: normal affect, no suicidal ideation                          9.7    9.18  )-----------( 298      ( 21 Jun 2018 08:04 )             30.2       06-21    138  |  102  |  16  ----------------------------<  120<H>  3.4<L>   |  23  |  1.30    Ca    8.4      21 Jun 2018 06:35  Phos  2.8     06-20  Mg     1.9     06-20    TPro  6.7  /  Alb  2.4<L>  /  TBili  0.6  /  DBili  x   /  AST  115<H>  /  ALT  207<H>  /  AlkPhos  151<H>  06-21          MICROBIOLOGY:  v  .Blood Blood-Peripheral  06-19-18   No growth to date.  --  --      .Sputum Sputum,conical  06-18-18 --  --  --      .Sputum Sputum  06-17-18 --  --  --      .Blood Blood  06-16-18   No growth to date.  --  --          Rapid RVP Result: NotDetec (06-18 @ 12:34)        RADIOLOGY:  < from: MR Abdomen w/wo IV Cont (06.19.18 @ 23:51) >    IMPRESSION:     Small simple right hepatic cyst. No suspicious hepatic lesion.        < from: CT Chest No Cont (06.16.18 @ 14:53) >  IMPRESSION:     Right upper and middle lobe patchy consolidations compatible with   infection.
Follow-up Pulm Progress Note  Valdemar Jackson MD  [phone]    No new respiratory events overnight.  Denies SOB/CP.   OOB, non toxic, NAD, feels OK  Ambulated on RA with sat 92%  ABd scan c/w simple hepatic cyst  Afebrile on Unasyn/azithromycin  CXR: 6/21: persistent R opacities, less dense, with small R effusion  TTE: pending      Vital Signs Last 24 Hrs  T(C): 37.2 (22 Jun 2018 05:02), Max: 37.2 (22 Jun 2018 05:02)  T(F): 99 (22 Jun 2018 05:02), Max: 99 (22 Jun 2018 05:02)  HR: 85 (22 Jun 2018 05:02) (83 - 85)  BP: 135/73 (22 Jun 2018 05:02) (133/71 - 137/70)  BP(mean): --  RR: 18 (22 Jun 2018 05:02) (18 - 18)  SpO2: 97% (22 Jun 2018 05:02) (92% - 97%)                              10.2   9.23  )-----------( 340      ( 22 Jun 2018 08:13 )             31.0       06-22    140  |  103  |  16  ----------------------------<  124<H>  3.9   |  24  |  1.29    Ca    8.6      22 Jun 2018 06:33  Mg     1.7     06-22    TPro  7.1  /  Alb  2.6<L>  /  TBili  0.5  /  DBili  x   /  AST  87<H>  /  ALT  168<H>  /  AlkPhos  146<H>  06-22    Culture Results:   No growth to date. (06-16 @ 15:12)    Most recent blood culture -- 06-17 @ 17:09   -- -- .Sputum Sputum 06-17 @ 17:09  Most recent blood culture -- 06-16 @ 15:12   -- -- .Blood Blood 06-16 @ 15:12      Physical Examination:  PULM: No significant wheeze or rhonchi; scattered R basilar crackles  CVS: Regular rate and rhythm, no murmurs, rubs, or gallops  ABD: Soft, non-tender  EXT:  No clubbing, cyanosis, or edema    RADIOLOGY REVIEWED  CXR:    CT chest:    TTE:
Follow-up Pulm Progress Note  Valdemar Jackson MD  [phone]    No new respiratory events overnight.  Denies SOB/CP.   OOB, non toxic, NAD, eating lunch  ABd scan c/w simple hepatic cyst  Afebrile on Unasy  CXR: no parapneumonic effusion; no change diffuse R sided opacities  PBNP elevated      Vital Signs Last 24 Hrs  T(C): 37.3 (21 Jun 2018 05:14), Max: 37.5 (20 Jun 2018 14:14)  T(F): 99.1 (21 Jun 2018 05:14), Max: 99.5 (20 Jun 2018 14:14)  HR: 96 (21 Jun 2018 05:14) (90 - 100)  BP: 128/66 (21 Jun 2018 05:14) (128/66 - 152/68)  BP(mean): --  RR: 18 (21 Jun 2018 05:14) (18 - 18)  SpO2: 96% (21 Jun 2018 05:14) (95% - 98%)                          9.7    9.18  )-----------( 298      ( 21 Jun 2018 08:04 )             30.2       06-21    138  |  102  |  16  ----------------------------<  120<H>  3.4<L>   |  23  |  1.30    Ca    8.4      21 Jun 2018 06:35  Phos  2.8     06-20  Mg     1.9     06-20    TPro  6.7  /  Alb  2.4<L>  /  TBili  0.6  /  DBili  x   /  AST  115<H>  /  ALT  207<H>  /  AlkPhos  151<H>  06-21    Culture Results:   No growth to date. (06-16 @ 15:12)  Culture Results:   No growth to date. (06-16 @ 15:12)    Most recent blood culture -- 06-17 @ 17:09   -- -- .Sputum Sputum 06-17 @ 17:09  Most recent blood culture -- 06-16 @ 15:12   -- -- .Blood Blood 06-16 @ 15:12      Physical Examination:  PULM: No significant wheeze or rhonchi; scattered R basilar crackles  CVS: Regular rate and rhythm, no murmurs, rubs, or gallops  ABD: Soft, non-tender  EXT:  No clubbing, cyanosis, or edema    RADIOLOGY REVIEWED  CXR:    CT chest:    TTE:
Follow-up Pulm Progress Note  Valdemar Jackson MD  [phone]    No new respiratory events overnight.  Denies SOB/CP.   Feels better: Alert, non ntoxic, eating lunch, without dyspnea. Still some blood streaked sputum  Temp 102, despite non toxicity  ABx changed to Unasyn/Azithromycin per ID    Medications:  Vital Signs Last 24 Hrs  T(C): 37.1 (19 Jun 2018 05:44), Max: 38.9 (18 Jun 2018 21:17)  T(F): 98.7 (19 Jun 2018 05:44), Max: 102.1 (18 Jun 2018 21:17)  HR: 89 (19 Jun 2018 05:44) (84 - 89)  BP: 152/72 (19 Jun 2018 05:44) (133/75 - 152/72)  BP(mean): --  RR: 18 (19 Jun 2018 05:44) (18 - 18)  SpO2: 93% (19 Jun 2018 05:44) (93% - 96%)      06-18 @ 07:01  -  06-19 @ 07:00  --------------------------------------------------------  IN: 360 mL / OUT: 100 mL / NET: 260 mL        LABS:                        11.0   7.36  )-----------( 241      ( 19 Jun 2018 08:11 )             31.9     06-19    134<L>  |  101  |  24<H>  ----------------------------<  189<H>  3.7   |  20<L>  |  1.44<H>    Ca    8.6      19 Jun 2018 06:34  Phos  2.6     06-19  Mg     2.1     06-19    TPro  6.9  /  Alb  2.3<L>  /  TBili  0.7  /  DBili  0.2  /  AST  526<H>  /  ALT  448<H>  /  AlkPhos  151<H>  06-19    CULTURES:  Culture Results:   No growth to date. (06-16 @ 15:12)  Culture Results:   No growth to date. (06-16 @ 15:12)    Most recent blood culture -- 06-17 @ 17:09   -- -- .Sputum Sputum 06-17 @ 17:09  Most recent blood culture -- 06-16 @ 15:12   -- -- .Blood Blood 06-16 @ 15:12      Physical Examination:  PULM:   CVS: Regular rate and rhythm, no murmurs, rubs, or gallops  ABD: Soft, non-tender  EXT:  No clubbing, cyanosis, or edema    RADIOLOGY REVIEWED  CXR:    CT chest:    TTE:
Follow-up Pulm Progress Note  Valdemar Jackson MD  [phone]    No new respiratory events overnight.  Denies SOB/CP.   Feels better today, minimal cough  ABd scan c/w simple hepatic cyst  Afebrile on Unasyn/Azithromycin  CXR: no parapneumonic effusion; no change diffuse R sided opacities    Vital Signs Last 24 Hrs  T(C): 37.6 (20 Jun 2018 05:47), Max: 38.4 (19 Jun 2018 20:57)  T(F): 99.6 (20 Jun 2018 05:47), Max: 101.1 (19 Jun 2018 20:57)  HR: 86 (20 Jun 2018 05:47) (84 - 93)  BP: 139/71 (20 Jun 2018 05:47) (139/71 - 153/63)  BP(mean): --  RR: 18 (20 Jun 2018 05:47) (18 - 18)  SpO2: 96% (20 Jun 2018 05:47) (93% - 98%)                       11.1   7.90  )-----------( 276      ( 20 Jun 2018 08:03 )             33.6       06-20    137  |  102  |  21  ----------------------------<  126<H>  3.8   |  18<L>  |  1.28    Ca    8.5      20 Jun 2018 06:17  Phos  2.8     06-20  Mg     1.9     06-20    TPro  7.0  /  Alb  2.1<L>  /  TBili  0.8  /  DBili  0.2  /  AST  181<H>  /  ALT  279<H>  /  AlkPhos  153<H>  06-20    TPro  6.9  /  Alb  2.3<L>  /  TBili  0.7  /  DBili  0.2  /  AST  526<H>  /  ALT  448<H>  /  AlkPhos  151<H>  06-19    CULTURES:  Culture Results:   No growth to date. (06-16 @ 15:12)  Culture Results:   No growth to date. (06-16 @ 15:12)    Most recent blood culture -- 06-17 @ 17:09   -- -- .Sputum Sputum 06-17 @ 17:09  Most recent blood culture -- 06-16 @ 15:12   -- -- .Blood Blood 06-16 @ 15:12      Physical Examination:  PULM: No significant wheeze or rhonchi  CVS: Regular rate and rhythm, no murmurs, rubs, or gallops  ABD: Soft, non-tender  EXT:  No clubbing, cyanosis, or edema    RADIOLOGY REVIEWED  CXR:    CT chest:    TTE:
Patient is a 89y old  Male who presents with a chief complaint of cough and fever (16 Jun 2018 16:58)      SUBJECTIVE / OVERNIGHT EVENTS  No chest pain. No shortness of breath. No complaints. No events overnight.     MEDICATIONS  (STANDING):  amLODIPine   Tablet 10 milliGRAM(s) Oral daily  ampicillin/sulbactam  IVPB 1.5 Gram(s) IV Intermittent every 6 hours  azithromycin  IVPB 500 milliGRAM(s) IV Intermittent every 24 hours  dextrose 5%. 1000 milliLiter(s) (50 mL/Hr) IV Continuous <Continuous>  dextrose 50% Injectable 12.5 Gram(s) IV Push once  dextrose 50% Injectable 25 Gram(s) IV Push once  dextrose 50% Injectable 25 Gram(s) IV Push once  heparin  Injectable 5000 Unit(s) SubCutaneous every 12 hours  insulin glargine Injectable (LANTUS) 14 Unit(s) SubCutaneous at bedtime  insulin lispro (HumaLOG) corrective regimen sliding scale   SubCutaneous three times a day before meals  insulin lispro (HumaLOG) corrective regimen sliding scale   SubCutaneous at bedtime  insulin lispro Injectable (HumaLOG) 8 Unit(s) SubCutaneous three times a day before meals  lisinopril 10 milliGRAM(s) Oral daily  pantoprazole    Tablet 40 milliGRAM(s) Oral before breakfast  tamsulosin 0.4 milliGRAM(s) Oral at bedtime    MEDICATIONS  (PRN):  dextrose 40% Gel 15 Gram(s) Oral once PRN Blood Glucose LESS THAN 70 milliGRAM(s)/deciliter  glucagon  Injectable 1 milliGRAM(s) IntraMuscular once PRN Glucose LESS THAN 70 milligrams/deciliter      Vital Signs Last 24 Hrs  T(C): 37.2 (22 Jun 2018 05:02), Max: 37.2 (22 Jun 2018 05:02)  T(F): 99 (22 Jun 2018 05:02), Max: 99 (22 Jun 2018 05:02)  HR: 85 (22 Jun 2018 05:02) (83 - 85)  BP: 135/73 (22 Jun 2018 05:02) (133/71 - 137/70)  BP(mean): --  RR: 18 (22 Jun 2018 05:02) (18 - 18)  SpO2: 92% (22 Jun 2018 10:00) (92% - 97%)  CAPILLARY BLOOD GLUCOSE      POCT Blood Glucose.: 122 mg/dL (22 Jun 2018 08:07)  POCT Blood Glucose.: 203 mg/dL (21 Jun 2018 21:42)  POCT Blood Glucose.: 176 mg/dL (21 Jun 2018 17:10)  POCT Blood Glucose.: 161 mg/dL (21 Jun 2018 11:57)    I&O's Summary    21 Jun 2018 07:01  -  22 Jun 2018 07:00  --------------------------------------------------------  IN: 1620 mL / OUT: 1150 mL / NET: 470 mL        PHYSICAL EXAM:  GENERAL: NAD, well-developed  HEAD:  Atraumatic, Normocephalic  EYES: EOMI, PERRLA, conjunctiva and sclera clear  NECK: Supple, No JVD  CHEST/LUNG: Clear to auscultation bilaterally; No wheeze  HEART: Regular rate and rhythm; No murmurs, rubs, or gallops  ABDOMEN: Soft, Nontender, Nondistended; Bowel sounds present  EXTREMITIES:  2+ Peripheral Pulses, No clubbing, cyanosis, or edema  PSYCH: AAOx3  NEUROLOGY: non-focal  SKIN: No rashes or lesions    LABS:                        10.2   9.23  )-----------( 340      ( 22 Jun 2018 08:13 )             31.0     06-22    140  |  103  |  16  ----------------------------<  124<H>  3.9   |  24  |  1.29    Ca    8.6      22 Jun 2018 06:33  Mg     1.7     06-22    TPro  7.1  /  Alb  2.6<L>  /  TBili  0.5  /  DBili  x   /  AST  87<H>  /  ALT  168<H>  /  AlkPhos  146<H>  06-22              RADIOLOGY & ADDITIONAL TESTS:    Imaging Personally Reviewed:    < from: MR Abdomen w/wo IV Cont (06.19.18 @ 23:51) >    Small simple right hepatic cyst. No suspicious hepatic lesion.        < end of copied text >    Consultant(s) Notes Reviewed:      Care Discussed with Consultants/Other Providers:
Patient is a 89y old  Male who presents with a chief complaint of cough and fever (16 Jun 2018 16:58)      SUBJECTIVE / OVERNIGHT EVENTS:  pt was tachypneic last night.  now feeling better.    MEDICATIONS  (STANDING):  amLODIPine   Tablet 10 milliGRAM(s) Oral daily  azithromycin  IVPB 500 milliGRAM(s) IV Intermittent every 24 hours  cefTRIAXone   IVPB 1 Gram(s) IV Intermittent every 24 hours  dextrose 5%. 1000 milliLiter(s) (50 mL/Hr) IV Continuous <Continuous>  dextrose 50% Injectable 12.5 Gram(s) IV Push once  dextrose 50% Injectable 25 Gram(s) IV Push once  dextrose 50% Injectable 25 Gram(s) IV Push once  heparin  Injectable 5000 Unit(s) SubCutaneous every 12 hours  insulin lispro (HumaLOG) corrective regimen sliding scale   SubCutaneous three times a day before meals  insulin lispro (HumaLOG) corrective regimen sliding scale   SubCutaneous at bedtime  lisinopril 10 milliGRAM(s) Oral daily  pantoprazole    Tablet 40 milliGRAM(s) Oral before breakfast  potassium phosphate IVPB 15 milliMole(s) IV Intermittent once  tamsulosin 0.4 milliGRAM(s) Oral at bedtime    MEDICATIONS  (PRN):  dextrose 40% Gel 15 Gram(s) Oral once PRN Blood Glucose LESS THAN 70 milliGRAM(s)/deciliter  glucagon  Injectable 1 milliGRAM(s) IntraMuscular once PRN Glucose LESS THAN 70 milligrams/deciliter      Vital Signs Last 24 Hrs  T(C): 37.6 (18 Jun 2018 05:24), Max: 37.6 (18 Jun 2018 05:24)  T(F): 99.6 (18 Jun 2018 05:24), Max: 99.6 (18 Jun 2018 05:24)  HR: 92 (18 Jun 2018 05:24) (74 - 93)  BP: 138/66 (18 Jun 2018 05:24) (132/77 - 149/64)  BP(mean): --  RR: 20 (18 Jun 2018 05:24) (18 - 35)  SpO2: 96% (18 Jun 2018 05:24) (95% - 99%)  CAPILLARY BLOOD GLUCOSE      POCT Blood Glucose.: 251 mg/dL (18 Jun 2018 08:08)  POCT Blood Glucose.: 324 mg/dL (17 Jun 2018 21:34)  POCT Blood Glucose.: 284 mg/dL (17 Jun 2018 17:07)  POCT Blood Glucose.: 235 mg/dL (17 Jun 2018 12:08)    I&O's Summary    17 Jun 2018 07:01  -  18 Jun 2018 07:00  --------------------------------------------------------  IN: 1460 mL / OUT: 700 mL / NET: 760 mL        PHYSICAL EXAM:  GENERAL: NAD, well-developed  HEAD:  Atraumatic, Normocephalic  EYES: EOMI, PERRLA, conjunctiva and sclera clear  NECK: Supple, No JVD  CHEST/LUNG: Clear to auscultation bilaterally; No wheeze  HEART: Regular rate and rhythm; No murmurs, rubs, or gallops  ABDOMEN: Soft, Nontender, Nondistended; Bowel sounds present  EXTREMITIES:  2+ Peripheral Pulses, No clubbing, cyanosis, or edema  PSYCH: AAOx3  NEUROLOGY: non-focal  SKIN: No rashes or lesions    LABS:                        11.0   8.51  )-----------( 218      ( 18 Jun 2018 08:00 )             33.0     06-18    136  |  102  |  26<H>  ----------------------------<  261<H>  3.5   |  18<L>  |  1.54<H>    Ca    8.7      18 Jun 2018 06:27  Phos  1.9     06-18  Mg     2.3     06-18    TPro  7.2  /  Alb  2.4<L>  /  TBili  0.6  /  DBili  0.2  /  AST  467<H>  /  ALT  284<H>  /  AlkPhos  125<H>  06-18              RADIOLOGY & ADDITIONAL TESTS:    Imaging Personally Reviewed:    Consultant(s) Notes Reviewed:      Care Discussed with Consultants/Other Providers:
Patient is a 89y old  Male who presents with a chief complaint of cough and fever (16 Jun 2018 16:58)    Being followed by ID for pna    Interval history:Febrile  cough and sputum better  No other acute events      ROS:  No SOB,CP  No N/V/D  No abd pain  No urinary complaints  No HA  No joint or limb pain  No other complaints      Antimicrobials:    ampicillin/sulbactam  IVPB 1.5 Gram(s) IV Intermittent every 6 hours  azithromycin  IVPB 500 milliGRAM(s) IV Intermittent every 24 hours      other medications reviewed    Vital Signs Last 24 Hrs  T(C): 37.1 (06-19-18 @ 05:44), Max: 38.9 (06-18-18 @ 21:17)  T(F): 98.7 (06-19-18 @ 05:44), Max: 102.1 (06-18-18 @ 21:17)  HR: 89 (06-19-18 @ 05:44) (84 - 89)  BP: 152/72 (06-19-18 @ 05:44) (133/75 - 152/72)  BP(mean): --  RR: 18 (06-19-18 @ 05:44) (18 - 18)  SpO2: 93% (06-19-18 @ 05:44) (93% - 96%)    Physical Exam:    Constitutional well preserved,comfortable,pleasant    HEENT PERRLA EOMI,No pallor or icterus    No oral exudate or erythema    Neck supple no JVD or LN    Chest Good AE,minimal basal crackles,rhonchi    CVS RRR S1 S2 WNl No murmur or rub or gallop    Abd soft BS normal No tenderness no masses    Ext No cyanosis clubbing or edema    IV site no erythema tenderness or discharge    Joints no swelling or LOM    CNS AAO X 3 no focal    Lab Data:                          11.0   7.36  )-----------( 241      ( 19 Jun 2018 08:11 )             31.9       06-19    134<L>  |  101  |  24<H>  ----------------------------<  189<H>  3.7   |  20<L>  |  1.44<H>    Ca    8.6      19 Jun 2018 06:34  Phos  2.6     06-19  Mg     2.1     06-19    TPro  6.9  /  Alb  2.3<L>  /  TBili  0.7  /  DBili  0.2  /  AST  526<H>  /  ALT  448<H>  /  AlkPhos  151<H>  06-19        Acid Fast Bacilli Smear:   No acid fast bacilli seen by fluorochrome stain (06.17.18 @ 17:09)      Culture - Blood (collected 16 Jun 2018 15:12)  Source: .Blood Blood  Preliminary Report (17 Jun 2018 16:01):    No growth to date.    Culture - Blood (collected 16 Jun 2018 15:12)  Source: .Blood Blood  Preliminary Report (17 Jun 2018 16:01):    No growth to date.      < from: US Abdomen Complete (06.18.18 @ 16:47) >  IMPRESSION: 2.2 cm complex cyst right lobe of liver medially containing   an area of isoechoic intracystic echoes filling three quarters of the   cyst with color flow demonstrated. Uncertain whether this color flow is a   true finding or artifactual. This cyst is slightly smaller in size than   on CT examinations performed 6/3/2015 and10 6/10/2015. The intraluminal   echoes may represent intracystic hemorrhage. Cannot exclude a true solid   component. Recommend MRI of the liver with intravenous contrast.    Mild decrease in the echogenicity of the pancreatic parenchyma of   uncertain significance.     Mild intrahepatic biliary duct dilatation again visualized with common   bile duct measuring 1 cm tapering distally to 6 mm. This is unchanged.    < end of copied text >
Patient is a 89y old  Male who presents with a chief complaint of cough and fever (16 Jun 2018 16:58)    Being followed by ID for pna, fever    Interval history:  No other acute events      ROS:  improved cough,no SOB,CP  No N/V/D  No abd pain  No urinary complaints  No HA  No joint or limb pain  No other complaints      Antimicrobials:    ampicillin/sulbactam  IVPB 1.5 Gram(s) IV Intermittent every 6 hours  azithromycin  IVPB 500 milliGRAM(s) IV Intermittent every 24 hours      other medications reviewed    Vital Signs Last 24 Hrs  T(C): 37.5 (06-20-18 @ 14:14), Max: 38.4 (06-19-18 @ 20:57)  T(F): 99.5 (06-20-18 @ 14:14), Max: 101.1 (06-19-18 @ 20:57)  HR: 90 (06-20-18 @ 14:14) (84 - 90)  BP: 152/68 (06-20-18 @ 14:14) (139/71 - 153/63)  BP(mean): --  RR: 18 (06-20-18 @ 14:14) (18 - 18)  SpO2: 98% (06-20-18 @ 14:14) (96% - 98%)    Physical Exam:    Constitutional well preserved,comfortable,pleasant    HEENT PERRLA EOMI,No pallor or icterus    No oral exudate or erythema    Neck supple no JVD or LN    Chest Good AE,bibasilar crackles     CVS RRR S1 S2 WNl No murmur or rub or gallop    Abd soft BS normal No tenderness no masses    Ext No cyanosis clubbing or edema    IV site no erythema tenderness or discharge    Joints no swelling or LOM    CNS AAO X 3 no focal    Lab Data:                          11.1   7.90  )-----------( 276      ( 20 Jun 2018 08:03 )             33.6   WBC Count: 7.90 (06-20-18 @ 08:03)  WBC Count: 7.36 (06-19-18 @ 08:11)  WBC Count: 8.51 (06-18-18 @ 08:00)  WBC Count: 11.61 (06-17-18 @ 08:20)  WBC Count: 11.3 (06-16-18 @ 11:31)      06-20    137  |  102  |  21  ----------------------------<  126<H>  3.8   |  18<L>  |  1.28    Ca    8.5      20 Jun 2018 06:17  Phos  2.8     06-20  Mg     1.9     06-20    TPro  7.0  /  Alb  2.1<L>  /  TBili  0.8  /  DBili  0.2  /  AST  181<H>  /  ALT  279<H>  /  AlkPhos  153<H>  06-20        Culture - Blood (collected 19 Jun 2018 01:07)  Source: .Blood Blood-Peripheral  Preliminary Report (20 Jun 2018 02:01):    No growth to date.    Culture - Blood (collected 19 Jun 2018 01:07)  Source: .Blood Blood-Peripheral  Preliminary Report (20 Jun 2018 02:01):    No growth to date.    Acid Fast Bacilli Smear:   No acid fast bacilli seen by fluorochrome stain (06.18.18 @ 17:10)    Acid Fast Bacilli Smear:   No acid fast bacilli seen by fluorochrome stain (06.17.18 @ 17:09)        Culture - Blood (collected 16 Jun 2018 15:12)  Source: .Blood Blood  Preliminary Report (17 Jun 2018 16:01):    No growth to date.    Culture - Blood (collected 16 Jun 2018 15:12)  Source: .Blood Blood  Preliminary Report (17 Jun 2018 16:01):    No growth to date.      < from: MR Abdomen w/wo IV Cont (06.19.18 @ 23:51) >  IMPRESSION:     Small simple right hepatic cyst. No suspicious hepatic lesion.        < end of copied text >      Legionella pneumophila Antigen, Urine (06.18.18 @ 20:28)    Legionella Antigen, Urine: Negative
Patient is a 89y old  Male who presents with a chief complaint of cough and fever (2018 16:58)       SUBJECTIVE / OVERNIGHT EVENTS:  sob and HIGGINBOTHAM.  had fever last night.  had large bm today.  denies nausea and vomiting.    MEDICATIONS  (STANDING):  amLODIPine   Tablet 10 milliGRAM(s) Oral daily  azithromycin  IVPB 500 milliGRAM(s) IV Intermittent every 24 hours  cefTRIAXone   IVPB 1 Gram(s) IV Intermittent every 24 hours  dextrose 5%. 1000 milliLiter(s) (50 mL/Hr) IV Continuous <Continuous>  dextrose 50% Injectable 12.5 Gram(s) IV Push once  dextrose 50% Injectable 25 Gram(s) IV Push once  dextrose 50% Injectable 25 Gram(s) IV Push once  heparin  Injectable 5000 Unit(s) SubCutaneous every 12 hours  insulin glargine Injectable (LANTUS) 14 Unit(s) SubCutaneous at bedtime  insulin lispro (HumaLOG) corrective regimen sliding scale   SubCutaneous three times a day before meals  insulin lispro (HumaLOG) corrective regimen sliding scale   SubCutaneous at bedtime  insulin lispro Injectable (HumaLOG) 8 Unit(s) SubCutaneous three times a day before meals  lisinopril 10 milliGRAM(s) Oral daily  pantoprazole    Tablet 40 milliGRAM(s) Oral before breakfast  tamsulosin 0.4 milliGRAM(s) Oral at bedtime    MEDICATIONS  (PRN):  dextrose 40% Gel 15 Gram(s) Oral once PRN Blood Glucose LESS THAN 70 milliGRAM(s)/deciliter  glucagon  Injectable 1 milliGRAM(s) IntraMuscular once PRN Glucose LESS THAN 70 milligrams/deciliter      Vital Signs Last 24 Hrs  T(C): 37.1 (2018 05:44), Max: 38.9 (2018 21:17)  T(F): 98.7 (2018 05:44), Max: 102.1 (2018 21:17)  HR: 89 (2018 05:44) (84 - 89)  BP: 152/72 (2018 05:44) (133/75 - 152/72)  BP(mean): --  RR: 18 (2018 05:44) (18 - 18)  SpO2: 93% (2018 05:44) (93% - 96%)  CAPILLARY BLOOD GLUCOSE      POCT Blood Glucose.: 171 mg/dL (2018 07:54)  POCT Blood Glucose.: 179 mg/dL (2018 22:32)  POCT Blood Glucose.: 242 mg/dL (2018 17:01)  POCT Blood Glucose.: 217 mg/dL (2018 11:57)    I&O's Summary    2018 07:01  -  2018 07:00  --------------------------------------------------------  IN: 360 mL / OUT: 100 mL / NET: 260 mL        PHYSICAL EXAM:  GENERAL: NAD, well-developed  HEAD:  Atraumatic, Normocephalic  EYES: EOMI, PERRLA, conjunctiva and sclera clear  NECK: Supple, No JVD  CHEST/LUNG: Clear to auscultation bilaterally; No wheeze  HEART: Regular rate and rhythm; No murmurs, rubs, or gallops  ABDOMEN: Soft, Nontender, Nondistended; Bowel sounds present  EXTREMITIES:  2+ Peripheral Pulses, No clubbing, cyanosis, or edema  PSYCH: AAOx3  NEUROLOGY: non-focal  SKIN: No rashes or lesions    LABS:                        11.0   7.36  )-----------( 241      ( 2018 08:11 )             31.9         134<L>  |  101  |  24<H>  ----------------------------<  189<H>  3.7   |  20<L>  |  1.44<H>    Ca    8.6      2018 06:34  Phos  2.6       Mg     2.1         TPro  6.9  /  Alb  2.3<L>  /  TBili  0.7  /  DBili  0.2  /  AST  526<H>  /  ALT  448<H>  /  AlkPhos  151<H>            Urinalysis Basic - ( 2018 22:50 )    Color: Yellow / Appearance: Clear / S.018 / pH: x  Gluc: x / Ketone: Negative  / Bili: Negative / Urobili: Negative   Blood: x / Protein: 100 mg/dL / Nitrite: Negative   Leuk Esterase: Negative / RBC: 3-5 /HPF / WBC 3-5 /HPF   Sq Epi: x / Non Sq Epi: OCC /HPF / Bacteria: x        RADIOLOGY & ADDITIONAL TESTS:    Imaging Personally Reviewed:    < from: US Abdomen Complete (18 @ 16:47) >  Liver: The right lobe of liver measures 13.7 cm in length. There is a   liver lesion located within the medial right lobe of the liver which   measures 1.8 cm x 1.8 cm x 2.2 cm. This cyst contains an area of   isoechoic intracystic echoes filling three quarters of the cyst with   color flow demonstrated. Liver contour is smooth.    Bile ducts: There is possible mild central intrahepatic biliary ductal   dilatation. Common bile duct measures 1 cm. It tapers distally to 6 mm.     Gallbladder: Gallbladder is without cholelithiasis or wall thickening.    Pancreas: Visualized pancreas is relatively decreased in echogenicity and   heterogeneousin appearance. The pancreatic duct at the body measures 2.4   mm at the head 2.8 mm. This is unchanged.    Spleen: 7.7 cm. Within normal limits.    Right kidney: 11.3 cm. No hydronephrosis. There is a small 1.3 cm x 1.2   cm x 1 cm midpole right renal cyst. There is a small septated lower pole   right renal cyst measuring 7 mm x 6 mm x 6 mm.    Left kidney: 11 cm.  No hydronephrosis. Tiny parenchymal calcification.   Few left kidney cysts, largest at the lower pole measuring 2.7 cm x 1.7   cm x 1.9 cm which is thinly septated.    Ascites: None.    Aorta and IVC: Visualized inferior vena cava is unremarkable. Visualized   abdominal aorta reveals mild atherosclerotic change.     IMPRESSION: 2.2 cm complex cyst right lobe of liver medially containing   an area of isoechoic intracystic echoes filling three quarters of the   cyst with color flow demonstrated. Uncertain whether this color flow is a   true finding or artifactual. This cyst is slightly smaller in size than   on CT examinations performed 6/3/2015 and10 6/10/2015. The intraluminal   echoes may represent intracystic hemorrhage. Cannot exclude a true solid   component. Recommend MRI of the liver with intravenous contrast.    Mild decrease in the echogenicity of the pancreatic parenchyma of   uncertain significance.     Mild intrahepatic biliary duct dilatation again visualized with common   bile duct measuring 1 cm tapering distally to 6 mm. This is unchanged.    < end of copied text >    Consultant(s) Notes Reviewed:      Care Discussed with Consultants/Other Providers:
Patient is a 89y old  Male who presents with a chief complaint of cough and fever (2018 16:58)      SUBJECTIVE / OVERNIGHT EVENTS:  still with fever overnight. had MRI    MEDICATIONS  (STANDING):  amLODIPine   Tablet 10 milliGRAM(s) Oral daily  ampicillin/sulbactam  IVPB 1.5 Gram(s) IV Intermittent every 6 hours  azithromycin  IVPB 500 milliGRAM(s) IV Intermittent every 24 hours  dextrose 5%. 1000 milliLiter(s) (50 mL/Hr) IV Continuous <Continuous>  dextrose 50% Injectable 12.5 Gram(s) IV Push once  dextrose 50% Injectable 25 Gram(s) IV Push once  dextrose 50% Injectable 25 Gram(s) IV Push once  heparin  Injectable 5000 Unit(s) SubCutaneous every 12 hours  insulin glargine Injectable (LANTUS) 14 Unit(s) SubCutaneous at bedtime  insulin lispro (HumaLOG) corrective regimen sliding scale   SubCutaneous three times a day before meals  insulin lispro (HumaLOG) corrective regimen sliding scale   SubCutaneous at bedtime  insulin lispro Injectable (HumaLOG) 8 Unit(s) SubCutaneous three times a day before meals  lisinopril 10 milliGRAM(s) Oral daily  pantoprazole    Tablet 40 milliGRAM(s) Oral before breakfast  tamsulosin 0.4 milliGRAM(s) Oral at bedtime    MEDICATIONS  (PRN):  dextrose 40% Gel 15 Gram(s) Oral once PRN Blood Glucose LESS THAN 70 milliGRAM(s)/deciliter  glucagon  Injectable 1 milliGRAM(s) IntraMuscular once PRN Glucose LESS THAN 70 milligrams/deciliter      Vital Signs Last 24 Hrs  T(C): 37.6 (2018 05:47), Max: 38.4 (2018 20:57)  T(F): 99.6 (2018 05:47), Max: 101.1 (2018 20:57)  HR: 86 (2018 05:47) (84 - 93)  BP: 139/71 (2018 05:47) (139/71 - 153/63)  BP(mean): --  RR: 18 (2018 05:47) (18 - 18)  SpO2: 96% (2018 05:47) (93% - 98%)  CAPILLARY BLOOD GLUCOSE      POCT Blood Glucose.: 111 mg/dL (2018 08:00)  POCT Blood Glucose.: 141 mg/dL (2018 21:56)  POCT Blood Glucose.: 172 mg/dL (2018 16:48)  POCT Blood Glucose.: 211 mg/dL (2018 12:00)    I&O's Summary    2018 07:01  -  2018 07:00  --------------------------------------------------------  IN: 1830 mL / OUT: 1150 mL / NET: 680 mL    2018 07:01  -  2018 10:06  --------------------------------------------------------  IN: 240 mL / OUT: 0 mL / NET: 240 mL        PHYSICAL EXAM:  GENERAL: NAD, well-developed  HEAD:  Atraumatic, Normocephalic  EYES: EOMI, PERRLA, conjunctiva and sclera clear  NECK: Supple, No JVD  CHEST/LUNG: Clear to auscultation bilaterally; No wheeze  HEART: Regular rate and rhythm; No murmurs, rubs, or gallops  ABDOMEN: Soft, Nontender, Nondistended; Bowel sounds present  EXTREMITIES:  2+ Peripheral Pulses, No clubbing, cyanosis, or edema  PSYCH: AAOx3  NEUROLOGY: non-focal  SKIN: No rashes or lesions    LABS:                        11.1   7.90  )-----------( 276      ( 2018 08:03 )             33.6         137  |  102  |  21  ----------------------------<  126<H>  3.8   |  18<L>  |  1.28    Ca    8.5      2018 06:17  Phos  2.8       Mg     1.9         TPro  7.0  /  Alb  2.1<L>  /  TBili  0.8  /  DBili  0.2  /  AST  181<H>  /  ALT  279<H>  /  AlkPhos  153<H>            Urinalysis Basic - ( 2018 22:50 )    Color: Yellow / Appearance: Clear / S.018 / pH: x  Gluc: x / Ketone: Negative  / Bili: Negative / Urobili: Negative   Blood: x / Protein: 100 mg/dL / Nitrite: Negative   Leuk Esterase: Negative / RBC: 3-5 /HPF / WBC 3-5 /HPF   Sq Epi: x / Non Sq Epi: OCC /HPF / Bacteria: x        RADIOLOGY & ADDITIONAL TESTS:    Imaging Personally Reviewed:    Consultant(s) Notes Reviewed:      Care Discussed with Consultants/Other Providers:
Patient is a 89y old  Male who presents with a chief complaint of cough and fever (22 Jun 2018 13:42)      SUBJECTIVE / OVERNIGHT EVENTS:  No chest pain. No shortness of breath. No complaints. No events overnight.     MEDICATIONS  (STANDING):  amLODIPine   Tablet 10 milliGRAM(s) Oral daily  ampicillin/sulbactam  IVPB 1.5 Gram(s) IV Intermittent every 6 hours  azithromycin  IVPB 500 milliGRAM(s) IV Intermittent every 24 hours  dextrose 5%. 1000 milliLiter(s) (50 mL/Hr) IV Continuous <Continuous>  dextrose 50% Injectable 12.5 Gram(s) IV Push once  dextrose 50% Injectable 25 Gram(s) IV Push once  dextrose 50% Injectable 25 Gram(s) IV Push once  heparin  Injectable 5000 Unit(s) SubCutaneous every 12 hours  insulin glargine Injectable (LANTUS) 14 Unit(s) SubCutaneous at bedtime  insulin lispro (HumaLOG) corrective regimen sliding scale   SubCutaneous three times a day before meals  insulin lispro (HumaLOG) corrective regimen sliding scale   SubCutaneous at bedtime  insulin lispro Injectable (HumaLOG) 8 Unit(s) SubCutaneous three times a day before meals  lisinopril 10 milliGRAM(s) Oral daily  pantoprazole    Tablet 40 milliGRAM(s) Oral before breakfast  tamsulosin 0.4 milliGRAM(s) Oral at bedtime    MEDICATIONS  (PRN):  dextrose 40% Gel 15 Gram(s) Oral once PRN Blood Glucose LESS THAN 70 milliGRAM(s)/deciliter  glucagon  Injectable 1 milliGRAM(s) IntraMuscular once PRN Glucose LESS THAN 70 milligrams/deciliter      Vital Signs Last 24 Hrs  T(C): 37.6 (23 Jun 2018 05:38), Max: 37.6 (23 Jun 2018 05:38)  T(F): 99.6 (23 Jun 2018 05:38), Max: 99.6 (23 Jun 2018 05:38)  HR: 97 (23 Jun 2018 05:38) (82 - 97)  BP: 145/76 (23 Jun 2018 05:38) (145/69 - 155/78)  BP(mean): --  RR: 18 (23 Jun 2018 05:38) (18 - 18)  SpO2: 93% (23 Jun 2018 05:38) (92% - 97%)  CAPILLARY BLOOD GLUCOSE      POCT Blood Glucose.: 179 mg/dL (23 Jun 2018 12:04)  POCT Blood Glucose.: 141 mg/dL (23 Jun 2018 08:32)  POCT Blood Glucose.: 188 mg/dL (22 Jun 2018 21:27)  POCT Blood Glucose.: 146 mg/dL (22 Jun 2018 16:47)    I&O's Summary    22 Jun 2018 07:01  -  23 Jun 2018 07:00  --------------------------------------------------------  IN: 2130 mL / OUT: 1310 mL / NET: 820 mL    23 Jun 2018 07:01  -  23 Jun 2018 12:39  --------------------------------------------------------  IN: 240 mL / OUT: 350 mL / NET: -110 mL        PHYSICAL EXAM:  GENERAL: NAD, well-developed  HEAD:  Atraumatic, Normocephalic  EYES: EOMI, PERRLA, conjunctiva and sclera clear  NECK: Supple, No JVD  CHEST/LUNG: Clear to auscultation bilaterally; No wheeze  HEART: Regular rate and rhythm; No murmurs, rubs, or gallops  ABDOMEN: Soft, Nontender, Nondistended; Bowel sounds present  EXTREMITIES:  2+ Peripheral Pulses, No clubbing, cyanosis, or edema  PSYCH: AAOx3  NEUROLOGY: non-focal  SKIN: No rashes or lesions    LABS:                        10.2   9.23  )-----------( 340      ( 22 Jun 2018 08:13 )             31.0     06-22    140  |  103  |  16  ----------------------------<  124<H>  3.9   |  24  |  1.29    Ca    8.6      22 Jun 2018 06:33  Mg     1.7     06-22    TPro  7.1  /  Alb  2.6<L>  /  TBili  0.5  /  DBili  x   /  AST  87<H>  /  ALT  168<H>  /  AlkPhos  146<H>  06-22              RADIOLOGY & ADDITIONAL TESTS:    Imaging Personally Reviewed:  < from: Transthoracic Echocardiogram (06.22.18 @ 13:05) >  Conclusions:  1. Mitral annular calcification and calcified mitral  leaflets with decreased diastolic opening. Minimal mitral  regurgitation. Mean transmitral valve gradient equals 12 mm  Hg, consistent with severe mitral stenosis. (HRabout 80s  bpm)  2. Transcatheter aortic valve replacement. Unable to  accurate assess aortic valve gradients due to elevated LVOT  velocity. No aortic valve regurgitation seen.  3. Hyperdynamic left ventricular systolic function. Left  ventricular outflow tract velocitieswere not well  recorded. Measured LVOT gradient is markedly elevated.  Marked left ventricular outflow tract gradient: 96 mm Hg,  without evidence of obstruction due to hyperdynamic LV  function.  4. Mild diastolic dysfunction (Stage I).  5. Normal right ventricular size and function.  6. Echodense material seen within the pericardium inferior  to the right heart. Small pericardial effusion.  7. Right pleural effusion.  *** Compared with echocardiogram of 9/12/2017, measured  LVOT gradient is significantly elevated and mitral gradient  is higher on today's study. LVOT gradients were  suboptimally recorded. Consider additional images for  further assessment of LVOT gradient if clinically  indicated. The pericardial effusion is also new on today's  study.    < end of copied text >      Consultant(s) Notes Reviewed:      Care Discussed with Consultants/Other Providers:
Patient is a 89y old  Male who presents with a chief complaint of cough and fever (22 Jun 2018 13:42)    Being followed by ID for pna    Interval history:clinically better  No acute events      ROS:  minimal  cough,no sputum no SOB,CP  No N/V/D./abd pain  No other complaints      Antimicrobials:    ampicillin/sulbactam  IVPB 1.5 Gram(s) IV Intermittent every 6 hours  azithromycin  IVPB 500 milliGRAM(s) IV Intermittent every 24 hours    Other medications reviewed    Vital Signs Last 24 Hrs  T(C): 36.8 (06-22-18 @ 14:17), Max: 37.2 (06-22-18 @ 05:02)  T(F): 98.3 (06-22-18 @ 14:17), Max: 99 (06-22-18 @ 05:02)  HR: 82 (06-22-18 @ 14:17) (82 - 85)  BP: 145/69 (06-22-18 @ 14:17) (133/71 - 145/69)  BP(mean): --  RR: 18 (06-22-18 @ 14:17) (18 - 18)  SpO2: 97% (06-22-18 @ 14:17) (92% - 97%)    Physical Exam:        HEENT PERRLA EOMI    No oral exudate or erythema    Chest Good AE, bilateral basal crackles     CVS RRR S1 S2 WNl No murmur or rub or gallop    Abd soft BS normal No tenderness no masses    IV site no erythema tenderness or discharge    CNS AAO X 3 no focal    Lab Data:                          10.2   9.23  )-----------( 340      ( 22 Jun 2018 08:13 )             31.0       06-22    140  |  103  |  16  ----------------------------<  124<H>  3.9   |  24  |  1.29    Ca    8.6      22 Jun 2018 06:33  Mg     1.7     06-22    TPro  7.1  /  Alb  2.6<L>  /  TBili  0.5  /  DBili  x   /  AST  87<H>  /  ALT  168<H>  /  AlkPhos  146<H>  06-22        Culture - Blood (collected 19 Jun 2018 01:07)  Source: .Blood Blood-Peripheral  Preliminary Report (20 Jun 2018 02:01):    No growth to date.    Culture - Blood (collected 19 Jun 2018 01:07)  Source: .Blood Blood-Peripheral  Preliminary Report (20 Jun 2018 02:01):    No growth to date.    Culture - Acid Fast - Sputum w/Smear (collected 18 Jun 2018 17:10)  Source: .Sputum Sputum,conical    Culture - Acid Fast - Sputum w/Smear (collected 17 Jun 2018 17:09)  Source: .Sputum Sputum

## 2018-06-24 LAB
CULTURE RESULTS: SIGNIFICANT CHANGE UP
CULTURE RESULTS: SIGNIFICANT CHANGE UP
SPECIMEN SOURCE: SIGNIFICANT CHANGE UP
SPECIMEN SOURCE: SIGNIFICANT CHANGE UP

## 2018-06-29 NOTE — CHART NOTE - NSCHARTNOTEFT_GEN_A_CORE
post dischage addendum    pt was admitted with sepsis  pt was treated for gram positive and gram negative pneumonia.    lorenzo blandon

## 2018-07-17 ENCOUNTER — APPOINTMENT (OUTPATIENT)
Dept: CARDIOLOGY | Facility: CLINIC | Age: 83
End: 2018-07-17

## 2018-07-17 ENCOUNTER — APPOINTMENT (OUTPATIENT)
Dept: CV DIAGNOSITCS | Facility: HOSPITAL | Age: 83
End: 2018-07-17

## 2018-11-09 ENCOUNTER — RX RENEWAL (OUTPATIENT)
Age: 83
End: 2018-11-09

## 2018-12-03 NOTE — PATIENT PROFILE ADULT. - SURGICAL SITE INCISION
[id number] [employer]. no

## 2019-04-16 NOTE — PROGRESS NOTE ADULT - PROBLEM/PLAN-2
Patient : Chriss Alcaraz Age: 64 year old Sex: male   MRN: [id number] Encounter Date: 4/16/2019      History     Chief Complaint   Patient presents with   • Back Pain     Pt is [place] speaking. History was obtained with the assistance of a .     HPI  Y01/Y01  4/16/2019  2:49 PM Chriss Alcaraz is a 64 year old male who presents to the ED for evaluation of lower back pain, which radiates down his RLE, that began 3 months ago, worsening recently. Pt denies experiencing any recent related injury or trauma. The pt also endorses sx of urinary frequency (chronic) and pain in his back with urination. He notes he has increased pain in his back when he urinates due to the way he has to stand to urinary. He denies any dysuria. The pt denies sx of hematuria, fever, CP, SOB, dysuria, urinary or bowel incontinence, saddle anesthesia, or all other relevant sx. Pt confirms he was tx for renal cell CA in the past; kdiney removed, no longer receiving tx. He is therefore unable to take NSAIDs. There are no further complaints or modifying factors at this time.    PCP: MICHELLE Jones   Allergies   Allergen Reactions   • Contrast Media HIVES and PRURITUS       Discharge Medication List as of 4/16/2019  3:19 PM      Prior to Admission Medications    Details   clotrimazole (LOTRIMIN) 1 % topical solution apply 1-2 drops to affected nails twice dailyDisp-90 mL, R-6, Eprescribe      metFORMIN (GLUCOPHAGE) 500 MG tablet Take 500 mg by mouth 2 times daily (with meals).Historical Med      hydrochlorothiazide (HYDRODIURIL) 12.5 MG tablet Take 12.5 mg by mouth daily.Historical Med      tamsulosin (FLOMAX) 0.4 MG Cap Take 1 capsule by mouth daily after a meal.Eprescribe, Disp-30 capsule, R-11      predniSONE (DELTASONE) 50 MG tablet Take one tablet by mouth 13 hours, 7 hours, and 1 hour prior to CT scan.Eprescribe, Disp-3 tablet, R-0      cholecalciferol (VITAMIN D3) 1000 UNITS tablet Take by mouth daily. Historical Med       tamsulosin (FLOMAX) 0.4 MG Cap Take 0.4 mg by mouth daily after a meal.Historical Med      clonazePAM (KLONOPIN) 2 MG tablet Take 2 mg by mouth nightly.Historical Med      omeprazole (PRILOSEC) 20 MG capsule Take 20 mg by mouth daily.Historical Med             Discharge Medication List as of 4/16/2019  3:19 PM      New Prescriptions    Details   HYDROcodone-acetaminophen (NORCO) 5-325 MG per tablet Take 1 tablet by mouth every 6 hours as needed for Pain.Normal, Disp-15 tablet, R-0      lidocaine (LIDODERM) 5 % patch Place 1 patch onto the skin every 24 hours. Remove patch 12 hours after applyingNormal, Disp-30 patch, R-0             Past Medical History:   Diagnosis Date   • Cerebral artery occlusion with cerebral infarction (CMS/HCC)    • Compression, esophagus     pt reports trouble swallowing at times/ throat closes when eats - takes medication for this   • Essential (primary) hypertension    • Kidney mass     left   • PVD (peripheral vascular disease) (CMS/HCC)        Past Surgical History:   Procedure Laterality Date   • ABDOMEN SURGERY      PERITONITIS; PERFORATED INTESTINE   • ABDOMEN SURGERY      perforated intestine 20 years ago   • CHOLECYSTECTOMY  1/1/13       Family History   Problem Relation Age of Onset   • Stroke Mother 42   • Cancer Father         lung cancer; smoker   • Diabetes Brother    • Hypertension Brother    • Stroke Brother        Social History     Tobacco Use   • Smoking status: Current Some Day Smoker     Packs/day: 0.25     Years: 20.00     Pack years: 5.00     Types: Cigarettes     Last attempt to quit: 4/1/2016     Years since quitting: 3.0   • Smokeless tobacco: Never Used   • Tobacco comment: Quitline given; quit 11/25/2015. still smoking   Substance Use Topics   • Alcohol use: No     Alcohol/week: 0.0 oz     Comment: now, one shot tonight, 8 days since last drink; no alcohol since 10/30/2015   • Drug use: No     Types: Cocaine     Comment: ONLY ON WEEKENDS; last cocaine 4/8/16        Review of Systems   Constitutional: Negative for chills and fever.   HENT: Negative for congestion and rhinorrhea.    Respiratory: Negative for cough and shortness of breath.    Cardiovascular: Negative for chest pain and leg swelling.   Gastrointestinal: Negative for abdominal pain, nausea and vomiting.        Negative for urinary or bowel incontinence   Genitourinary: Positive for frequency. Negative for dysuria and hematuria.        +pain in his back with urination    Musculoskeletal: Positive for back pain (radiates down RLE). Negative for arthralgias and myalgias.   Skin: Negative for rash.   Allergic/Immunologic: Negative for immunocompromised state.   Neurological: Negative for dizziness and light-headedness.       Physical Exam     ED Triage Vitals [04/16/19 1349]   ED Triage Vitals Group      Temp 97.7 °F (36.5 °C)      Pulse 103      Resp 17      BP (!) 144/92      SpO2 97 %      EtCO2 mmHg       Height 5' 8\" (1.727 m)      Weight 194 lb 0.1 oz (88 kg)      Weight Scale Used ED Stated       Physical Exam   Constitutional: He appears well-developed and well-nourished. No distress.   HENT:   Head: Normocephalic and atraumatic.   Right Ear: External ear normal.   Left Ear: External ear normal.   Eyes: Pupils are equal, round, and reactive to light. EOM are normal. No scleral icterus.   Neck: Neck supple.   Cardiovascular: Normal rate, regular rhythm, normal heart sounds and intact distal pulses.   Pulses:       Dorsalis pedis pulses are 2+ on the right side, and 2+ on the left side.   Pulmonary/Chest: Effort normal and breath sounds normal. No respiratory distress. He has no wheezes. He has no rales.   Abdominal: Soft. Bowel sounds are normal. He exhibits no distension. There is no tenderness. There is no CVA tenderness.   Musculoskeletal:        Thoracic back: Normal. He exhibits normal range of motion, no tenderness, no bony tenderness and no swelling.        Lumbar back: Normal. He exhibits normal  range of motion, no tenderness, no bony tenderness and no swelling.   Tenderness in R lumbar paraspinal muscles and R buttock. Positive R straight leg raise. No calf tenderness bilaterally. Ambulates without difficulty.   Neurological: He is alert.   Skin: Skin is warm and dry. He is not diaphoretic. No erythema. No pallor.   Psychiatric: He has a normal mood and affect.   Nursing note and vitals reviewed.      ED Course     Procedures    Lab Results     No results found for this visit on 04/16/19.    EKG Results       Radiology Results     Imaging Results    None         ED Medication Orders (From admission, onward)    Start Ordered     Status Ordering Provider    04/16/19 1555 04/16/19 1354  lidocaine (LIDOCARE) 4 % patch 1 patch  DAILY      Last MAR action:  Patch Applied OLYA SUTTON    04/16/19 1355 04/16/19 1354  HYDROcodone-acetaminophen (NORCO) 5-325 MG per tablet 1 tablet  ONCE      Last MAR action:  Given OLYA SUTTON               [facility]  ED Course  Initial Impression: Pt presents to the ED for evaluation of back pain that radiates down into his RLE. I expressed concern for sciatica as the cause of his pain; I explained what this is. Given no bony tenderness on exam, do not feel emergent imaging indicated at this time. We discussed the plan of care including follow up with the [facility], as well as pain control. Pt relays he is limited in pain control options as he only has 1 kidney. I informed him that he should return to the ED for any new or worsening symptoms. They understand and agree with the plan. Any questions were answered.     3:19 PM  Pt's PDMP history was reviewed; no aberrant behavior was observed.    [facility]  Critical Care time spent on this patient outside of billable procedures:  None    Clinical Impression  ED Diagnosis        Final diagnosis    Sciatica of right side                The patient was provided with a recommendation to follow up with a primary care provider and  obtain reassessment of his/her blood pressure within three months.    Follow Up:  Jose Medrano MD  [address]  [address]  [facility] [address]  [phone]      call to be seen for follow up       Discharge Medication List as of 4/16/2019  3:19 PM      START taking these medications    Details   HYDROcodone-acetaminophen (NORCO) 5-325 MG per tablet Take 1 tablet by mouth every 6 hours as needed for Pain.Normal, Disp-15 tablet, R-0      lidocaine (LIDODERM) 5 % patch Place 1 patch onto the skin every 24 hours. Remove patch 12 hours after applyingNormal, Disp-30 patch, R-0                Summary of your Discharge Medications      Take these Medications      Details   HYDROcodone-acetaminophen 5-325 MG per tablet  Commonly known as:  NORCO   Take 1 tablet by mouth every 6 hours as needed for Pain.     lidocaine 5 % patch  Commonly known as:  LIDODERM   Place 1 patch onto the skin every 24 hours. Remove patch 12 hours after applying            Pt is discharged to home/self care in stable condition.      I have reviewed the information recorded by the scribe for accuracy and agree with its contents.    ____________________________________________________________________    Porter Kumar acting as a scribe for Irma Kerr PA-C.    Irma Kerr PA-C  Dictation # [id number]  Scribe: Porter Kumar    Attending Physician: Dr. Olivier Malloy  Dictation # [id number]      Irma Kerr PA-C  04/16/19 9917     DISPLAY PLAN FREE TEXT

## 2020-03-18 NOTE — PATIENT PROFILE ADULT. - FUNCTIONAL SCREEN CURRENT LEVEL: EATING, MLM
Assessment and Plan   Diagnoses and all orders for this visit:    1. Piriformis syndrome, unspecified laterality  Unclear etiology of pain although most likely musculoskeletal.  Recommend physical therapy    2. Benign localized prostatic hyperplasia with lower urinary tract symptoms (LUTS)  -     PSA W/ REFLX FREE PSA; Future  Discussed the risk and benefits of getting PSA testing. Patient is agreeable to the plan. Reports that symptoms are not severe enough that he wants any medications for it. Also does not want any medications for erectile dysfunction at this time. 3. Essential (primary) hypertension  -     atenoloL (TENORMIN) 50 mg tablet; TK 1 T PO QAM FOR HIGH BLOOD PRESSURE  -     triamterene-hydroCHLOROthiazide (MAXZIDE) 37.5-25 mg per tablet; TK 1 T PO QAM FOR HIGH BLOOD PRESSURE  Elevated today although has not been on his medications for several days. Refilled    4. Fatty liver  -     METABOLIC PANEL, COMPREHENSIVE; Future  -     LIPID PANEL; Future    5. Prediabetes  -     HEMOGLOBIN A1C WITH EAG; Future  -     LIPID PANEL; Future    6. Multiple nevi  7. History of skin cancer  -     REFERRAL TO DERMATOLOGY  Referral for skin check    8. SANDRA on CPAP  -     SLEEP MEDICINE REFERRAL  Needs new CPAP supplies    9. Encounter for immunization  -     [place] IMMUNIZ ADMIN,1 SINGLE/COMB VAC/TOXOID  -     TETANUS, DIPHTHERIA TOXOIDS AND ACELLULAR PERTUSSIS VACCINE (TDAP), IN INDIVIDS. >=7, IM  -     PNEUMOCOCCAL POLYSACCHARIDE VACCINE, 23-VALENT, ADULT OR IMMUNOSUPPRESSED PT DOSE,    Reports that he had a colonoscopy at the age of 39. Not sure where he had a done. Will get us the name so we can get the records. We will need to discuss the shingles vaccine at next visit. Benefits, risks, possible drug interactions, and side effects of all new medications were reviewed with the patient. Pt verbalized understanding.     Return to clinic: 6 months for general follow-up    Yesi Mariano MD  [facility]  3/18/2020    No future appointments. History of Present Illness   Chief Complaint   Establish care    Dale Andino is a 46 y.o. male     Former patient of Dr. Hedy Hylton    ? Piriformis syndrome has had ongoing pain in his hips and buttocks for the past couple years. Has to sleep with his legs raised. Will get better during the day. Will sometimes radiate down his leg to his knees. Also has cramping in his feet. Describes the pain as mostly achy although occasionally sharp. Reports [facility] normal.    Hypertensionstarted on medications in December by [facility].  Tolerating well. Denies any chest pain, shortness of breath, lightheadedness, dizziness    LUTS -reports noticing increased urgency in the past couple months. Has also noticed erectile dysfunction in the past 6 months. Denies any dysuria, hematuria, fever, chills, night sweats. Review of Systems   Constitutional: Negative for chills and fever. HENT: Negative for hearing loss. Eyes: Negative for blurred vision. Respiratory: Negative for shortness of breath. Cardiovascular: Negative for chest pain. Gastrointestinal: Negative for abdominal pain, blood in stool, constipation, diarrhea, melena, nausea and vomiting. Genitourinary: Positive for urgency. Negative for dysuria and hematuria. Musculoskeletal: Positive for back pain and joint pain. Skin: Negative for rash. Neurological: Negative for headaches. Past Medical History     Allergies   Allergen Reactions    Statins-Hmg-Coa Reductase Inhibitors Myalgia        Current Outpatient Medications   Medication Sig    cinnamon bark (Cinnamon) 500 mg cap Take  by mouth.  magnesium 200 mg tab Take  by mouth.     atenoloL (TENORMIN) 50 mg tablet TK 1 T PO QAM FOR HIGH BLOOD PRESSURE    triamterene-hydroCHLOROthiazide (MAXZIDE) 37.5-25 mg per tablet TK 1 T PO QAM FOR HIGH BLOOD PRESSURE    aspirin 81 mg chewable tablet Take 81 mg by mouth daily.  multivitamin (ONE A DAY) tablet Take 1 Tab by mouth daily.  cholecalciferol (VITAMIN D3) 1,000 unit cap Take  by mouth daily. No current facility-administered medications for this visit. Patient Active Problem List   Diagnosis Code    SANDRA on CPAP G47.33, Z99.89    Prediabetes R73.03    Family history of premature CAD Z82.49    Essential hypertension I10    Severe obesity (BMI 35.0-39. 9) E66.01    Fatty liver K76.0    History of skin cancer Z85.828    Carpal tunnel syndrome G56.00    Hypercholesterolemia E78.00    Piriformis syndrome G57.00     Past Surgical History:   Procedure Laterality Date    HX WISDOM TEETH EXTRACTION        Social History     Tobacco Use    Smoking status: Light Tobacco Smoker     Types: Cigars    Smokeless tobacco: Never Used    Tobacco comment: 0-1 cigars a month only in winter   Substance Use Topics    Alcohol use: Yes     Alcohol/week: 1.0 standard drinks     Types: 1 Shots of liquor per week     Comment: 1 drink a week - mixed drinks or wine      Family History   Problem Relation Age of Onset    Cancer Mother         Breast    Elevated Lipids Father     Heart Attack Father 48    Hypertension Neg Hx     Diabetes Neg Hx         Physical Exam   Vitals:       Visit Vitals  /86 (BP 1 Location: Left arm, BP Patient Position: Sitting)   Pulse (!) 53   Temp 99 °F (37.2 °C) (Oral)   Resp 18   Ht 6' (1.829 m)   Wt 274 lb 8 oz (124.5 kg)   SpO2 97%   BMI 37.23 kg/m²        Physical Exam  Constitutional:       General: He is not in acute distress. Appearance: He is well-developed. HENT:      Right Ear: Tympanic membrane, ear canal and external ear normal.      Left Ear: Tympanic membrane, ear canal and external ear normal.      Nose: Nose normal.      Mouth/Throat:      Mouth: Mucous membranes are moist.      Pharynx: No posterior oropharyngeal erythema.    Eyes:      Conjunctiva/sclera: Conjunctivae normal.      Pupils: Pupils are equal, round, and reactive to light. Neck:      Musculoskeletal: Neck supple. Cardiovascular:      Rate and Rhythm: Normal rate and regular rhythm. Pulses: Normal pulses. Heart sounds: No murmur. No friction rub. No gallop. Pulmonary:      Effort: No respiratory distress. Breath sounds: No wheezing or rales. Abdominal:      General: Bowel sounds are normal. There is no distension. Palpations: Abdomen is soft. There is no hepatomegaly, splenomegaly or mass. Tenderness: There is no abdominal tenderness. Hernia: No hernia is present. Musculoskeletal:      Comments: Normal gait. No spinal tenderness to palpation. No pain with range of motion of the hips or knees. Skin:     General: Skin is warm. Findings: No rash. Comments: Scattered nevi   Neurological:      Mental Status: He is alert. Psychiatric:         Mood and Affect: Mood normal.         Thought Content:  Thought content normal.         Judgment: Judgment normal.          Voice recognition software is utilized and this note may contain transcription errors (0) independent

## 2020-12-16 NOTE — PHYSICAL THERAPY INITIAL EVALUATION ADULT - TRANSFER SAFETY CONCERNS NOTED: SIT/STAND, REHAB EVAL
please call [phone] and ask for dental resident on call please call [phone] and ask for dental resident on call/[facility]:  [facility]… decreased weight-shifting ability/losing balance/decreased sequencing ability

## 2022-01-04 NOTE — H&P ADULT - VASCULAR
Equal and normal pulses (carotid, femoral, dorsalis pedis) [place] Counseling:  I discussed with the patient the risks of imiquimod including but not limited to erythema, scaling, itching, weeping, crusting, and pain.  Patient understands that the inflammatory response to imiquimod is variable from person to person and was educated regarded proper titration schedule.  If flu-like symptoms develop, patient knows to discontinue the medication and contact us.

## 2022-02-14 NOTE — PATIENT PROFILE ADULT. - FUNCTIONAL SCREEN CURRENT LEVEL: TOILETING, MLM
VSS. Pt had one dose of tylenol and Ibuprofen overnight for mild discomfort. Pt breastfeeding infant without assistance for latch. Pt also encouraged to hand express after each feeding to help bring in strong supply. Pt tolerating regular diet, ambulating in room, showered independently and voiding without difficulty. Postpartum checks WNL. Pt and spouse bonding well with infant. States she is ready for discharge this morning if possible.   normal bilaterally (0) independent

## 2023-08-19 NOTE — PROGRESS NOTE ADULT - ASSESSMENT
Interval History:     Review of Systems   Constitutional:  Negative for chills and fever.   HENT:  Negative for congestion and rhinorrhea.    Respiratory:  Positive for cough and shortness of breath.    Cardiovascular:  Negative for chest pain and palpitations.   Gastrointestinal:  Negative for abdominal distention, abdominal pain, nausea and vomiting.   Endocrine: Negative for polydipsia, polyphagia and polyuria.   Genitourinary:  Negative for decreased urine volume and dysuria.   Musculoskeletal:  Negative for myalgias.   Skin:  Positive for wound (abdomen. post op).   Neurological:  Negative for dizziness, seizures, syncope, speech difficulty and headaches.   Psychiatric/Behavioral:  Positive for agitation and confusion.    All other systems reviewed and are negative.    Objective:     Vital Signs (Most Recent):  Temp: 98.4 °F (36.9 °C) (08/19/23 1225)  Pulse: 71 (08/19/23 1225)  Resp: 17 (08/19/23 1225)  BP: (!) 103/52 (08/19/23 1225)  SpO2: (!) 94 % (08/19/23 1225) Vital Signs (24h Range):  Temp:  [97.9 °F (36.6 °C)-99.1 °F (37.3 °C)] 98.4 °F (36.9 °C)  Pulse:  [70-81] 71  Resp:  [16-18] 17  SpO2:  [94 %-97 %] 94 %  BP: (103-152)/(46-71) 103/52     Weight: 87.1 kg (192 lb 0.3 oz)  Body mass index is 27.55 kg/m².    Intake/Output Summary (Last 24 hours) at 8/19/2023 1645  Last data filed at 8/19/2023 0236  Gross per 24 hour   Intake --   Output 200 ml   Net -200 ml         Physical Exam  Vitals and nursing note reviewed.   Constitutional:       General: He is not in acute distress.     Appearance: He is normal weight. He is not ill-appearing.      Comments: Frail appearance   HENT:      Head: Normocephalic and atraumatic.      Nose: Nose normal.      Mouth/Throat:      Mouth: Mucous membranes are dry.      Pharynx: No posterior oropharyngeal erythema.   Eyes:      General: No scleral icterus.     Conjunctiva/sclera: Conjunctivae normal.      Pupils: Pupils are equal, round, and reactive to light.    Cardiovascular:      Rate and Rhythm: Normal rate and regular rhythm.      Pulses: Normal pulses.      Heart sounds: Murmur heard.   Pulmonary:      Effort: Pulmonary effort is normal. No respiratory distress.      Breath sounds: Rales present. No wheezing.   Abdominal:      General: Bowel sounds are normal. There is no distension.      Palpations: Abdomen is soft.      Tenderness: There is abdominal tenderness. There is no guarding or rebound.      Comments: Surgical wound with granulation tissue. Dressing in place, clean and dry. No signs of infection or bleeding.   Musculoskeletal:      Cervical back: No rigidity.   Skin:     Capillary Refill: Capillary refill takes less than 2 seconds.      Coloration: Skin is not jaundiced or pale.      Findings: No rash.   Neurological:      General: No focal deficit present.      Mental Status: He is alert. Mental status is at baseline.      Cranial Nerves: No cranial nerve deficit.      Sensory: No sensory deficit.      Motor: Weakness present.             Significant Labs: All pertinent labs within the past 24 hours have been reviewed.    Significant Imaging: I have reviewed all pertinent imaging results/findings within the past 24 hours.   Assessment  DMT2: 89y Male with DM T2 with hyperglycemia on insulin, blood sugars improving, no hypoglycemic episode,  eating meals, compliant with low carb diet, not ready for insulin at home.  PNA: On medications, stable, monitored.  HTN: Controlled, On med.

## 2024-01-02 NOTE — CONSULT NOTE ADULT - SUBJECTIVE AND OBJECTIVE BOX
PULMONARY CONSULT  Valdemar Jackson MD  [phone]    Initial HPI on admission:  HPI:  89 y/o M with h/o HTN, chol, LBBB, DM2, BPH, PVD, AS s/p valve replacement July 2015 recent 6 month vacation outside the country to [place] and [place] p/w cough with hemoptysis, fever, chills, and night sweats x3 days. Pt was feeling fine previously, began having a cough and some "sniffles" 4 days ago after gardening all day, 3 days ago began having a dry non-productive cough that progressed to a cough with hemoptysis described as bright red blood tinged sputum. Pt reports breathing quickly, +SOB. +myalgias/arthralgias 2d ago resolved with [place]. No advil or tylenol since Thursday. Denies massive hemoptysis. Pt had no known TB exposure, no known prior h/o TB, no known prior PPD. 2 pack year remote smoking hx, quit 60 years ago. Pt denies prior episodes, recent/recurrent lung infections, respiratory dz, known h/o CA, wt loss, numbness, paresthesias, weakness, altered mental status, syncope, change in vision, chest pain, back pain, abdominal pain, n/v/d, recent leg pain or swelling, or sick contacts. (16 Jun 2018 16:58)    Patient is remote smoker: DC 40+ years ago  Denies history TB/Contct    PAST MEDICAL & SURGICAL HISTORY:  Vocal cord polyp: pending treatment diagnosed in 2014  BPH (benign prostatic hypertrophy)  PVD (peripheral vascular disease)  LBBB (left bundle branch block): incomplete  HTN (hypertension)  Hypercholesterolemia  DM2 (diabetes mellitus, type 2)  AS (aortic stenosis)  History of artificial heart valve    Allergies    Cipro (Pruritus; Rash)    FAMILY HISTORY:  No pertinent family history in first degree relatives  Social history: , lives alone. 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   Review of Systems:  · Negative General Symptoms	no sweating; no anorexia; no weight loss; no weight gain	  · General Symptoms	fever; chills; malaise; fatigue; weakness	  · Negative Skin Symptoms	no rash; no itching; no dryness	  · Negative Ophthalmologic Symptoms	no diplopia; no photophobia; no lacrimation L; no lacrimation R; no blurred vision L; no blurred vision R	  · Negative ENMT Symptoms	no hearing difficulty; no ear pain; no tinnitus; no vertigo	  · Negative Respiratory and Thorax Symptoms	no wheezing; no pleuritic chest pain	  · Respiratory and Thorax Symptoms	dyspnea  cough  hemoptysis	  · Negative Cardiovascular Symptoms	no chest pain; no palpitations; no dyspnea on exertion; no orthopnea; no paroxysmal nocturnal dyspnea	  · Negative Gastrointestinal Symptoms	no nausea; no vomiting; no diarrhea; no constipation	  · Negative General Genitourinary Symptoms	no hematuria; no renal colic; no flank pain L; no flank pain R; no dysuria	  · Negative Musculoskeletal Symptoms	no arthritis; no joint swelling	  · Musculoskeletal Symptoms	arthralgia; myalgia	  · Neurological	negative	  · Psychiatric	negative	  · Hematology/Lymphatics	negative	  · Endocrine	negative	  · Allergic/Immunologic	negative	      Allergies and Intolerances:        Allergies:  	Cipro: Drug, Pruritus, Rash      Medications:  MEDICATIONS  (STANDING):  amLODIPine   Tablet 10 milliGRAM(s) Oral daily  azithromycin  IVPB 500 milliGRAM(s) IV Intermittent every 24 hours  cefTRIAXone   IVPB 1 Gram(s) IV Intermittent every 24 hours  dextrose 5%. 1000 milliLiter(s) (50 mL/Hr) IV Continuous <Continuous>  dextrose 50% Injectable 12.5 Gram(s) IV Push once  dextrose 50% Injectable 25 Gram(s) IV Push once  dextrose 50% Injectable 25 Gram(s) IV Push once  heparin  Injectable 5000 Unit(s) SubCutaneous every 12 hours  insulin lispro (HumaLOG) corrective regimen sliding scale   SubCutaneous three times a day before meals  insulin lispro (HumaLOG) corrective regimen sliding scale   SubCutaneous at bedtime  lisinopril 10 milliGRAM(s) Oral daily  pantoprazole    Tablet 40 milliGRAM(s) Oral before breakfast  potassium phosphate IVPB 15 milliMole(s) IV Intermittent once  tamsulosin 0.4 milliGRAM(s) Oral at bedtime    MEDICATIONS  (PRN):  dextrose 40% Gel 15 Gram(s) Oral once PRN Blood Glucose LESS THAN 70 milliGRAM(s)/deciliter  glucagon  Injectable 1 milliGRAM(s) IntraMuscular once PRN Glucose LESS THAN 70 milligrams/deciliter    Vital Signs Last 24 Hrs  T(C): 37.6 (18 Jun 2018 05:24), Max: 37.6 (18 Jun 2018 05:24)  T(F): 99.6 (18 Jun 2018 05:24), Max: 99.6 (18 Jun 2018 05:24)  HR: 92 (18 Jun 2018 05:24) (74 - 93)  BP: 138/66 (18 Jun 2018 05:24) (132/77 - 149/64)  BP(mean): --  RR: 20 (18 Jun 2018 05:24) (18 - 35)  SpO2: 96% (18 Jun 2018 05:24) (95% - 99%)      06-17 @ 07:01  -  06-18 @ 07:00  --------------------------------------------------------  IN: 1460 mL / OUT: 700 mL / NET: 760 mL      LABS:                        11.0   8.51  )-----------( 218      ( 18 Jun 2018 08:00 )             33.0     06-18    136  |  102  |  26<H>  ----------------------------<  261<H>  3.5   |  18<L>  |  1.54<H>    Ca    8.7      18 Jun 2018 06:27  Phos  1.9     06-18  Mg     2.3     06-18    TPro  7.2  /  Alb  2.4<L>  /  TBili  0.6  /  DBili  0.2  /  AST  467<H>  /  ALT  284<H>  /  AlkPhos  125<H>  06-18    CULTURES:  Culture Results:   No growth to date. (06-16 @ 15:12)  Culture Results:   No growth to date. (06-16 @ 15:12)    Physical Examination:    General: Non Toxic, No acute distress.  O2 sat on nasal: 97%    HEENT: Pupils equal, reactive to light.  Symmetric.    PULM: Clear without wheeze or rhonchi    CVS: Regular rate and rhythm, no murmurs, rubs, or gallops    ABD: Soft, nondistended, nontender, normoactive bowel sounds, no masses    EXT: No edema, nontender    SKIN: Warm and well perfused, no rashes noted.    NEURO: Alert, oriented, interactive, nonfocal    RADIOLOGY REVIEWED PERSONALLY  CXR:    CT chest: COnsolidation primrily RML and RLL c/w multi-focal pneuminia; small R effusion    TTE: 2/17: Mod MS, mild diastolic dysfuntion; LVEF 74 % ; PA 44 Render Note In Bullet Format When Appropriate: No Post-Care Instructions: I reviewed with the patient in detail post-care instructions. Patient is to wear sunprotection, and avoid picking at any of the treated lesions. Pt may apply Vaseline to crusted or scabbing areas. Duration Of Freeze Thaw-Cycle (Seconds): 0 Show Aperture Variable?: Yes Detail Level: Simple Consent: The patient's consent was obtained including but not limited to risks of crusting, scabbing, blistering, scarring, darker or lighter pigmentary change, recurrence, incomplete removal and infection. Medical Necessity Information: It is in your best interest to select a reason for this procedure from the list below. All of these items fulfill various CMS LCD requirements except the new and changing color options. Spray Paint Text: The liquid nitrogen was applied to the skin utilizing a spray paint frosting technique. Medical Necessity Clause: This procedure was medically necessary because the lesions that were treated were:

## 2024-01-21 NOTE — PHYSICAL THERAPY INITIAL EVALUATION ADULT - GENERAL OBSERVATIONS, REHAB EVAL
Pt received supine in bed & O x 4. Pt with (+)2 LO2 via NC in place, pt in NAD.
PRINCIPAL DISCHARGE DIAGNOSIS  Diagnosis: Right knee pain  Assessment and Plan of Treatment: You were admitted with right knee pain after a fall.   You were having difficulty walking due to the pain.   You had imaging of the knee, all of which were negative for any fractures.   You were seen by Physical Therapy who recommended for you to go home with home PT.   You should return to the ED with any further pain s/p a fall, or any difficulty walking.      SECONDARY DISCHARGE DIAGNOSES  Diagnosis: DM (diabetes mellitus)  Assessment and Plan of Treatment: Type 2 diabetes is a disease that affects how your body uses glucose (sugar). Either your body cannot make enough insulin, or it cannot use the insulin correctly. It is important to keep diabetes controlled to prevent damage to your heart, blood vessels, and other organs.  You should continue to take  your medications as prescribed and monitor your blood glucose levels.   You should work with your doctor or dietician to develop a meal plan that works for you and your schedule. A dietitian can help you learn how to eat the right amount of carbohydrates during your meals and snacks. Carbohydrates can raise your blood sugar if you eat too many at one time. Some foods that contain carbohydrates include breads, cereals, rice, pasta, and sweets.      Diagnosis: HTN (hypertension)  Assessment and Plan of Treatment: You have a history of Hypertension.   Hypertension is high blood pressure. Your blood pressure is the force of your blood moving against the walls of your arteries. Hypertension causes your blood pressure to get so high that your heart has to work much harder than normal. This can damage your heart. The cause of hypertension may not be known. This is called essential or primary hypertension. Hypertension caused by another medical condition, such as kidney disease, is called secondary hypertension.  You should continue to take you anti hypertensives as prescribed.   You should return to the Emergency deparrtment if you experience any severe headaches, vision loss or weakness in the arms or legs.  You should follow up with your primary doctor to have you blood pressur checked routinely.  You should follow a low salt diet.      Diagnosis: HLD (hyperlipidemia)  Assessment and Plan of Treatment: Hyperlipidemia is a high level of lipids (fats) in your blood. These lipids include cholesterol or triglycerides. Lipids are made by your body. They also come from the foods you eat. Your body needs lipids to work properly, but high levels increase your risk for heart disease, heart attack, and stroke.      Diagnosis: CAD (coronary artery disease)  Assessment and Plan of Treatment: Coronary artery disease (CAD) is narrowing of the arteries to your heart caused by a buildup of plaque. Plaque is made up of cholesterol and other substances. The narrowing in your arteries decreases the amount of blood that can flow to your heart. This causes your heart to get less oxygen, which may be life-threatening.      Diagnosis: ESRD on dialysis  Assessment and Plan of Treatment: ESRD is a longstanding disease of the kidneys leading to renal failure. The kidneys filter waste and excess fluid from the blood. As kidneys fail, waste builds up. Symptoms develop slowly and aren't specific to the disease. Some people have no symptoms at all and are diagnosed by a lab test. Medications help manage symptoms. In later stages, filtering the blood with a machine (dialysis) or a transplant may be needed. Please continue to get dialysis on your scheduled days and follow up with a nephrologist and your PCP regularly.

## 2024-07-10 NOTE — ED PROVIDER NOTE - CARE PLAN
Labs are normal and at goal except the following: Urine shows there is protein but not as much as previous.  Diabetes is slightly uncontrolled with A1c of 8.2, kidney filtration rate slightly decreased but at baseline and potassium level is slightly elevated.  Platelet count is elevated, no anemia but the ferritin which is stored iron is in the low normal range and vitamin B12 is low normal range.  I recommend stopping irbesartan and starting B12 500 mcg daily and ferrous sulfate 325 mg daily and these 2 supplements will be sent to pharmacy.  I would like to recheck potassium level and platelet count a few days prior to his next scheduled visit.  Schedule lab appointment Principal Discharge DX:	Pneumonia

## 2024-07-25 NOTE — H&P ADULT - NEGATIVE ENMT SYMPTOMS
To obtain blood tests given lack of follow up with PCP    For follow up in 6 months    no vertigo/no tinnitus/no hearing difficulty/no ear pain

## 2025-05-16 NOTE — PHYSICAL THERAPY INITIAL EVALUATION ADULT - GAIT TRAINING, PT EVAL
"  Problem: Patient Care Overview  Goal: Plan of Care Review  12/10/2019 1543 by Soni Quesada, PT Student  Flowsheets (Taken 12/10/2019 1110)  Progress: no change  Plan of Care Reviewed With: patient  Outcome Summary: PT evaluation completed. Pt A&O x4. Pt demonstrated decreased balance throughought evaluation. Pt required supervision for sup to sit. He required CGA for all sitting and standing balance d/t leaning, postural sway, and LOB. Pt required CGA and RW during ambulation.  Pt stated that he \"probably will need to use his walker when he gets home\". He was educated on importance of using the RW and on proper use. Pt demonstrated impulsivity when turning w/ RW and had a LOB from which he was able to recover w/ CGA. Feel pt would benefit from skilled PT in order to improve balance and continue practicing using the RW. Recommend d/c home w/ assist and HH.      "
Her/She
GOAL: Patient will ambulate 150-200 ft independently in 2 weeks.